# Patient Record
Sex: MALE | Race: WHITE | NOT HISPANIC OR LATINO | Employment: OTHER | URBAN - METROPOLITAN AREA
[De-identification: names, ages, dates, MRNs, and addresses within clinical notes are randomized per-mention and may not be internally consistent; named-entity substitution may affect disease eponyms.]

---

## 2023-03-14 DIAGNOSIS — E11.49 TYPE 2 DIABETES MELLITUS WITH OTHER DIABETIC NEUROLOGICAL COMPLICATION (MULTI): ICD-10-CM

## 2023-03-14 RX ORDER — PIOGLITAZONEHYDROCHLORIDE 45 MG/1
TABLET ORAL
Qty: 90 TABLET | Refills: 1 | Status: SHIPPED | OUTPATIENT
Start: 2023-03-14 | End: 2023-06-19 | Stop reason: SDUPTHER

## 2023-03-15 LAB
ALANINE AMINOTRANSFERASE (SGPT) (U/L) IN SER/PLAS: 33 U/L (ref 10–52)
ALBUMIN (G/DL) IN SER/PLAS: 4.6 G/DL (ref 3.4–5)
ALKALINE PHOSPHATASE (U/L) IN SER/PLAS: 50 U/L (ref 33–136)
ANION GAP IN SER/PLAS: 12 MMOL/L (ref 10–20)
ASPARTATE AMINOTRANSFERASE (SGOT) (U/L) IN SER/PLAS: 25 U/L (ref 9–39)
BASOPHILS (10*3/UL) IN BLOOD BY AUTOMATED COUNT: 0.05 X10E9/L (ref 0–0.1)
BASOPHILS/100 LEUKOCYTES IN BLOOD BY AUTOMATED COUNT: 0.7 % (ref 0–2)
BILIRUBIN TOTAL (MG/DL) IN SER/PLAS: 0.4 MG/DL (ref 0–1.2)
CALCIUM (MG/DL) IN SER/PLAS: 10.1 MG/DL (ref 8.6–10.3)
CARBON DIOXIDE, TOTAL (MMOL/L) IN SER/PLAS: 30 MMOL/L (ref 21–32)
CHLORIDE (MMOL/L) IN SER/PLAS: 102 MMOL/L (ref 98–107)
CREATININE (MG/DL) IN SER/PLAS: 1.32 MG/DL (ref 0.5–1.3)
EOSINOPHILS (10*3/UL) IN BLOOD BY AUTOMATED COUNT: 0.3 X10E9/L (ref 0–0.7)
EOSINOPHILS/100 LEUKOCYTES IN BLOOD BY AUTOMATED COUNT: 4.2 % (ref 0–6)
ERYTHROCYTE DISTRIBUTION WIDTH (RATIO) BY AUTOMATED COUNT: 14.6 % (ref 11.5–14.5)
ERYTHROCYTE MEAN CORPUSCULAR HEMOGLOBIN CONCENTRATION (G/DL) BY AUTOMATED: 31.8 G/DL (ref 32–36)
ERYTHROCYTE MEAN CORPUSCULAR VOLUME (FL) BY AUTOMATED COUNT: 91 FL (ref 80–100)
ERYTHROCYTES (10*6/UL) IN BLOOD BY AUTOMATED COUNT: 4.48 X10E12/L (ref 4.5–5.9)
GFR MALE: 58 ML/MIN/1.73M2
GLUCOSE (MG/DL) IN SER/PLAS: 146 MG/DL (ref 74–99)
HEMATOCRIT (%) IN BLOOD BY AUTOMATED COUNT: 40.9 % (ref 41–52)
HEMOGLOBIN (G/DL) IN BLOOD: 13 G/DL (ref 13.5–17.5)
IMMATURE GRANULOCYTES/100 LEUKOCYTES IN BLOOD BY AUTOMATED COUNT: 0.3 % (ref 0–0.9)
LEUKOCYTES (10*3/UL) IN BLOOD BY AUTOMATED COUNT: 7.2 X10E9/L (ref 4.4–11.3)
LYMPHOCYTES (10*3/UL) IN BLOOD BY AUTOMATED COUNT: 1.71 X10E9/L (ref 1.2–4.8)
LYMPHOCYTES/100 LEUKOCYTES IN BLOOD BY AUTOMATED COUNT: 23.9 % (ref 13–44)
MONOCYTES (10*3/UL) IN BLOOD BY AUTOMATED COUNT: 0.85 X10E9/L (ref 0.1–1)
MONOCYTES/100 LEUKOCYTES IN BLOOD BY AUTOMATED COUNT: 11.9 % (ref 2–10)
NEUTROPHILS (10*3/UL) IN BLOOD BY AUTOMATED COUNT: 4.23 X10E9/L (ref 1.2–7.7)
NEUTROPHILS/100 LEUKOCYTES IN BLOOD BY AUTOMATED COUNT: 59 % (ref 40–80)
PLATELETS (10*3/UL) IN BLOOD AUTOMATED COUNT: 234 X10E9/L (ref 150–450)
POTASSIUM (MMOL/L) IN SER/PLAS: 3.7 MMOL/L (ref 3.5–5.3)
PROTEIN TOTAL: 7.5 G/DL (ref 6.4–8.2)
SODIUM (MMOL/L) IN SER/PLAS: 140 MMOL/L (ref 136–145)
URATE (MG/DL) IN SER/PLAS: 9.2 MG/DL (ref 4–7.5)
UREA NITROGEN (MG/DL) IN SER/PLAS: 26 MG/DL (ref 6–23)

## 2023-03-30 DIAGNOSIS — I10 ESSENTIAL (PRIMARY) HYPERTENSION: ICD-10-CM

## 2023-03-30 DIAGNOSIS — Z86.79 PERSONAL HISTORY OF OTHER DISEASES OF THE CIRCULATORY SYSTEM: ICD-10-CM

## 2023-03-30 PROBLEM — E66.9 OBESITY: Status: ACTIVE | Noted: 2023-03-30

## 2023-03-30 PROBLEM — E11.9 TYPE 2 DIABETES MELLITUS (MULTI): Status: ACTIVE | Noted: 2023-03-30

## 2023-03-30 PROBLEM — M25.40 JOINT SWELLING: Status: ACTIVE | Noted: 2023-03-30

## 2023-03-30 PROBLEM — E11.65 POORLY CONTROLLED TYPE 2 DIABETES MELLITUS (MULTI): Status: ACTIVE | Noted: 2023-03-30

## 2023-03-30 PROBLEM — N40.0 BPH (BENIGN PROSTATIC HYPERPLASIA): Status: ACTIVE | Noted: 2023-03-30

## 2023-03-30 PROBLEM — K76.0 FATTY LIVER: Status: ACTIVE | Noted: 2023-03-30

## 2023-03-30 PROBLEM — Z86.0100 PERSONAL HISTORY OF COLONIC POLYPS: Status: ACTIVE | Noted: 2023-03-30

## 2023-03-30 PROBLEM — E83.10 DISORDER OF IRON METABOLISM: Status: ACTIVE | Noted: 2023-03-30

## 2023-03-30 PROBLEM — Z95.5 PRESENCE OF CORONARY ANGIOPLASTY IMPLANT AND GRAFT: Status: ACTIVE | Noted: 2023-03-30

## 2023-03-30 PROBLEM — M79.89 SWELLING OF LEFT HAND: Status: ACTIVE | Noted: 2023-03-30

## 2023-03-30 PROBLEM — I50.42 CHRONIC COMBINED SYSTOLIC AND DIASTOLIC CHF (CONGESTIVE HEART FAILURE) (MULTI): Status: ACTIVE | Noted: 2023-03-30

## 2023-03-30 PROBLEM — L20.9 ATOPIC DERMATITIS: Status: ACTIVE | Noted: 2023-03-30

## 2023-03-30 PROBLEM — G25.81 RESTLESS LEGS SYNDROME: Status: ACTIVE | Noted: 2023-03-30

## 2023-03-30 PROBLEM — I51.9 CARDIAC DISEASE: Status: ACTIVE | Noted: 2023-03-30
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
## 2023-03-30 PROBLEM — G47.33 OBSTRUCTIVE SLEEP APNEA SYNDROME IN ADULT: Status: ACTIVE | Noted: 2023-03-30

## 2023-03-30 PROBLEM — I51.7 HYPERTROPHY OF INTER-ATRIAL SEPTUM: Status: ACTIVE | Noted: 2023-03-30

## 2023-03-30 PROBLEM — E83.52 HYPERCALCEMIA: Status: ACTIVE | Noted: 2023-03-30

## 2023-03-30 PROBLEM — J30.2 SEASONAL ALLERGIES: Status: ACTIVE | Noted: 2023-03-30

## 2023-03-30 PROBLEM — N28.89 RENAL MASS: Status: ACTIVE | Noted: 2023-03-30

## 2023-03-30 PROBLEM — I48.0 PAF (PAROXYSMAL ATRIAL FIBRILLATION) (MULTI): Status: ACTIVE | Noted: 2023-03-30

## 2023-03-30 PROBLEM — I25.10 ASHD (ARTERIOSCLEROTIC HEART DISEASE): Status: ACTIVE | Noted: 2023-03-30

## 2023-03-30 PROBLEM — E78.5 HYPERLIPIDEMIA: Status: ACTIVE | Noted: 2023-03-30

## 2023-03-30 PROBLEM — Z86.010 PERSONAL HISTORY OF COLONIC POLYPS: Status: ACTIVE | Noted: 2023-03-30

## 2023-03-30 PROBLEM — M65.332 TRIGGER MIDDLE FINGER OF LEFT HAND: Status: ACTIVE | Noted: 2023-03-30

## 2023-03-30 PROBLEM — K21.9 CHRONIC GERD: Status: ACTIVE | Noted: 2023-03-30

## 2023-03-30 PROBLEM — D64.9 ANEMIA: Status: ACTIVE | Noted: 2023-03-30

## 2023-03-30 PROBLEM — E11.49: Status: ACTIVE | Noted: 2023-03-30

## 2023-03-30 PROBLEM — M19.042 PRIMARY OSTEOARTHRITIS OF LEFT HAND: Status: ACTIVE | Noted: 2023-03-30

## 2023-03-30 PROBLEM — L03.114 CELLULITIS OF HAND, LEFT: Status: ACTIVE | Noted: 2023-03-30

## 2023-03-30 PROBLEM — M54.50 LOW BACK PAIN: Status: ACTIVE | Noted: 2023-03-30

## 2023-03-30 RX ORDER — CARVEDILOL 25 MG/1
TABLET ORAL 2 TIMES DAILY
COMMUNITY
Start: 2017-05-17 | End: 2023-03-31 | Stop reason: SDUPTHER

## 2023-03-30 RX ORDER — CLONIDINE HYDROCHLORIDE 0.1 MG/1
0.1 TABLET ORAL 2 TIMES DAILY
COMMUNITY
End: 2023-03-31 | Stop reason: SDUPTHER

## 2023-03-30 RX ORDER — TRIAMCINOLONE ACETONIDE 1 MG/G
CREAM TOPICAL
COMMUNITY
Start: 2023-01-04 | End: 2023-06-19 | Stop reason: ALTCHOICE

## 2023-03-30 RX ORDER — PROPRANOLOL/HYDROCHLOROTHIAZID 40 MG-25MG
1 TABLET ORAL DAILY
COMMUNITY

## 2023-03-30 RX ORDER — ZINC GLUCONATE 50 MG
1 TABLET ORAL DAILY
COMMUNITY
Start: 2021-11-10

## 2023-03-30 RX ORDER — BLOOD SUGAR DIAGNOSTIC
STRIP MISCELLANEOUS
COMMUNITY

## 2023-03-30 RX ORDER — AMLODIPINE BESYLATE 2.5 MG/1
2.5 TABLET ORAL NIGHTLY
COMMUNITY
End: 2023-03-31 | Stop reason: SDUPTHER

## 2023-03-30 RX ORDER — LOSARTAN POTASSIUM 100 MG/1
1 TABLET ORAL DAILY
COMMUNITY
Start: 2022-12-20 | End: 2023-12-04

## 2023-03-30 RX ORDER — GLUCOSAMINE/CHONDROITIN/C/MANG 500-400 MG
CAPSULE ORAL
COMMUNITY

## 2023-03-30 RX ORDER — GLIMEPIRIDE 1 MG/1
1 TABLET ORAL DAILY
COMMUNITY
Start: 2022-03-03 | End: 2023-06-07 | Stop reason: SDUPTHER

## 2023-03-30 RX ORDER — ASPIRIN 81 MG/1
TABLET ORAL
COMMUNITY

## 2023-03-30 RX ORDER — FERROUS SULFATE 325(65) MG
65 TABLET ORAL DAILY
COMMUNITY

## 2023-03-30 RX ORDER — MULTIVIT WITH IRON,MINERALS
1 TABLET,CHEWABLE ORAL DAILY
COMMUNITY
End: 2024-01-09 | Stop reason: WASHOUT

## 2023-03-30 RX ORDER — PIOGLITAZONEHYDROCHLORIDE 45 MG/1
1 TABLET ORAL DAILY
COMMUNITY
Start: 2017-05-17 | End: 2023-08-07 | Stop reason: SDUPTHER

## 2023-03-30 RX ORDER — MONTELUKAST SODIUM 4 MG/1
TABLET, CHEWABLE ORAL
COMMUNITY
Start: 2016-10-27 | End: 2023-06-07 | Stop reason: SDUPTHER

## 2023-03-30 RX ORDER — EMPAGLIFLOZIN 25 MG/1
25 TABLET, FILM COATED ORAL DAILY
COMMUNITY
End: 2023-12-04

## 2023-03-30 RX ORDER — GABAPENTIN 300 MG/1
600 CAPSULE ORAL NIGHTLY
COMMUNITY
End: 2023-12-04

## 2023-03-30 RX ORDER — OMEGA-3 FATTY ACIDS/FISH OIL 340-1000MG
CAPSULE ORAL
COMMUNITY
End: 2023-06-19 | Stop reason: SDUPTHER

## 2023-03-30 RX ORDER — METFORMIN HYDROCHLORIDE 1000 MG/1
1000 TABLET ORAL EVERY 12 HOURS
COMMUNITY
End: 2023-04-13

## 2023-03-30 RX ORDER — HYDROCHLOROTHIAZIDE 25 MG/1
1 TABLET ORAL DAILY
COMMUNITY
Start: 2017-05-17 | End: 2023-03-31 | Stop reason: SDUPTHER

## 2023-03-30 RX ORDER — MULTIVITAMIN
1 TABLET ORAL DAILY
COMMUNITY
Start: 2017-05-17

## 2023-03-30 RX ORDER — EXENATIDE 2 MG/.85ML
INJECTION, SUSPENSION, EXTENDED RELEASE SUBCUTANEOUS
COMMUNITY
End: 2023-08-25

## 2023-03-30 RX ORDER — ROSUVASTATIN CALCIUM 20 MG/1
20 TABLET, COATED ORAL DAILY
COMMUNITY
End: 2024-01-22

## 2023-03-30 RX ORDER — FUROSEMIDE 40 MG/1
40 TABLET ORAL DAILY
COMMUNITY
End: 2023-12-04

## 2023-03-30 RX ORDER — ASCORBIC ACID 500 MG
TABLET ORAL
COMMUNITY

## 2023-03-30 RX ORDER — MAG HYDROX/ALUMINUM HYD/SIMETH 400-400-40
1 SUSPENSION, ORAL (FINAL DOSE FORM) ORAL DAILY
COMMUNITY

## 2023-03-30 RX ORDER — FAMOTIDINE 20 MG/1
1 TABLET, FILM COATED ORAL DAILY
COMMUNITY
Start: 2017-05-17 | End: 2023-06-07 | Stop reason: SDUPTHER

## 2023-03-30 RX ORDER — ALLOPURINOL 100 MG/1
1 TABLET ORAL DAILY
COMMUNITY
Start: 2023-03-22 | End: 2024-01-09 | Stop reason: SDUPTHER

## 2023-03-30 RX ORDER — LISINOPRIL 20 MG/1
1 TABLET ORAL DAILY
COMMUNITY
Start: 2017-05-17 | End: 2023-06-19 | Stop reason: ALTCHOICE

## 2023-03-30 RX ORDER — CALC/MAG/B COMPLEX/D3/HERB 61
1 TABLET ORAL DAILY
COMMUNITY

## 2023-03-31 RX ORDER — CARVEDILOL 25 MG/1
TABLET ORAL
Qty: 180 TABLET | Refills: 1 | Status: SHIPPED | OUTPATIENT
Start: 2023-03-31 | End: 2023-07-10

## 2023-03-31 RX ORDER — HYDROCHLOROTHIAZIDE 25 MG/1
TABLET ORAL
Qty: 90 TABLET | Refills: 1 | Status: SHIPPED | OUTPATIENT
Start: 2023-03-31 | End: 2023-08-07

## 2023-03-31 RX ORDER — AMLODIPINE BESYLATE 2.5 MG/1
TABLET ORAL
Qty: 90 TABLET | Refills: 1 | Status: SHIPPED | OUTPATIENT
Start: 2023-03-31 | End: 2023-08-07

## 2023-04-08 DIAGNOSIS — E11.49 TYPE 2 DIABETES MELLITUS WITH OTHER DIABETIC NEUROLOGICAL COMPLICATION (MULTI): ICD-10-CM

## 2023-04-13 RX ORDER — METFORMIN HYDROCHLORIDE 1000 MG/1
TABLET ORAL
Qty: 180 TABLET | Refills: 1 | Status: SHIPPED | OUTPATIENT
Start: 2023-04-13 | End: 2023-08-07

## 2023-06-03 DIAGNOSIS — E66.9 OBESITY, UNSPECIFIED: ICD-10-CM

## 2023-06-03 DIAGNOSIS — E78.5 HYPERLIPIDEMIA, UNSPECIFIED: ICD-10-CM

## 2023-06-03 DIAGNOSIS — E83.52 HYPERCALCEMIA: ICD-10-CM

## 2023-06-06 PROBLEM — T46.4X5A ACE-INHIBITOR COUGH: Status: ACTIVE | Noted: 2023-06-06

## 2023-06-06 PROBLEM — R30.0 DYSURIA: Status: ACTIVE | Noted: 2023-06-06

## 2023-06-06 PROBLEM — M62.9 NODULE IN MUSCLE: Status: ACTIVE | Noted: 2023-06-06

## 2023-06-06 PROBLEM — R05.8 ACE-INHIBITOR COUGH: Status: ACTIVE | Noted: 2023-06-06

## 2023-06-06 RX ORDER — CLONIDINE HYDROCHLORIDE 0.1 MG/1
1 TABLET ORAL 2 TIMES DAILY
COMMUNITY
Start: 2017-04-04

## 2023-06-07 RX ORDER — MONTELUKAST SODIUM 4 MG/1
TABLET, CHEWABLE ORAL
Qty: 360 TABLET | Refills: 0 | Status: SHIPPED | OUTPATIENT
Start: 2023-06-07 | End: 2023-07-10

## 2023-06-07 RX ORDER — GLIMEPIRIDE 1 MG/1
TABLET ORAL
Qty: 90 TABLET | Refills: 0 | Status: SHIPPED | OUTPATIENT
Start: 2023-06-07 | End: 2023-07-10

## 2023-06-07 RX ORDER — FAMOTIDINE 20 MG/1
TABLET, FILM COATED ORAL
Qty: 90 TABLET | Refills: 0 | Status: SHIPPED | OUTPATIENT
Start: 2023-06-07 | End: 2023-07-10

## 2023-06-12 LAB
ALANINE AMINOTRANSFERASE (SGPT) (U/L) IN SER/PLAS: 40 U/L (ref 10–52)
ALBUMIN (G/DL) IN SER/PLAS: 4.5 G/DL (ref 3.4–5)
ALBUMIN (MG/L) IN URINE: 7.2 MG/L
ALBUMIN/CREATININE (UG/MG) IN URINE: 9.3 UG/MG CRT (ref 0–30)
ALKALINE PHOSPHATASE (U/L) IN SER/PLAS: 53 U/L (ref 33–136)
ANION GAP IN SER/PLAS: 16 MMOL/L (ref 10–20)
ASPARTATE AMINOTRANSFERASE (SGOT) (U/L) IN SER/PLAS: 31 U/L (ref 9–39)
BASOPHILS (10*3/UL) IN BLOOD BY AUTOMATED COUNT: 0.04 X10E9/L (ref 0–0.1)
BASOPHILS/100 LEUKOCYTES IN BLOOD BY AUTOMATED COUNT: 0.5 % (ref 0–2)
BILIRUBIN TOTAL (MG/DL) IN SER/PLAS: 0.5 MG/DL (ref 0–1.2)
CALCIDIOL (25 OH VITAMIN D3) (NG/ML) IN SER/PLAS: 34 NG/ML
CALCIUM (MG/DL) IN SER/PLAS: 10.5 MG/DL (ref 8.6–10.3)
CARBON DIOXIDE, TOTAL (MMOL/L) IN SER/PLAS: 27 MMOL/L (ref 21–32)
CHLORIDE (MMOL/L) IN SER/PLAS: 101 MMOL/L (ref 98–107)
CHOLESTEROL (MG/DL) IN SER/PLAS: 119 MG/DL (ref 0–199)
CHOLESTEROL IN HDL (MG/DL) IN SER/PLAS: 35.5 MG/DL
CHOLESTEROL/HDL RATIO: 3.4
CREATININE (MG/DL) IN SER/PLAS: 1.24 MG/DL (ref 0.5–1.3)
CREATININE (MG/DL) IN URINE: 77.1 MG/DL (ref 20–370)
EOSINOPHILS (10*3/UL) IN BLOOD BY AUTOMATED COUNT: 0.47 X10E9/L (ref 0–0.7)
EOSINOPHILS/100 LEUKOCYTES IN BLOOD BY AUTOMATED COUNT: 6 % (ref 0–6)
ERYTHROCYTE DISTRIBUTION WIDTH (RATIO) BY AUTOMATED COUNT: 14.6 % (ref 11.5–14.5)
ERYTHROCYTE MEAN CORPUSCULAR HEMOGLOBIN CONCENTRATION (G/DL) BY AUTOMATED: 31.7 G/DL (ref 32–36)
ERYTHROCYTE MEAN CORPUSCULAR VOLUME (FL) BY AUTOMATED COUNT: 92 FL (ref 80–100)
ERYTHROCYTES (10*6/UL) IN BLOOD BY AUTOMATED COUNT: 4.5 X10E12/L (ref 4.5–5.9)
ESTIMATED AVERAGE GLUCOSE FOR HBA1C: 166 MG/DL
FERRITIN (UG/LL) IN SER/PLAS: 103 UG/L (ref 20–300)
GFR MALE: 62 ML/MIN/1.73M2
GLUCOSE (MG/DL) IN SER/PLAS: 151 MG/DL (ref 74–99)
HEMATOCRIT (%) IN BLOOD BY AUTOMATED COUNT: 41.3 % (ref 41–52)
HEMOGLOBIN (G/DL) IN BLOOD: 13.1 G/DL (ref 13.5–17.5)
HEMOGLOBIN A1C/HEMOGLOBIN TOTAL IN BLOOD: 7.4 %
IMMATURE GRANULOCYTES/100 LEUKOCYTES IN BLOOD BY AUTOMATED COUNT: 0.5 % (ref 0–0.9)
IRON (UG/DL) IN SER/PLAS: 42 UG/DL (ref 35–150)
IRON BINDING CAPACITY (UG/DL) IN SER/PLAS: 423 UG/DL (ref 240–445)
IRON SATURATION (%) IN SER/PLAS: 10 % (ref 25–45)
LDL: 52 MG/DL (ref 0–99)
LEUKOCYTES (10*3/UL) IN BLOOD BY AUTOMATED COUNT: 7.9 X10E9/L (ref 4.4–11.3)
LYMPHOCYTES (10*3/UL) IN BLOOD BY AUTOMATED COUNT: 1.46 X10E9/L (ref 1.2–4.8)
LYMPHOCYTES/100 LEUKOCYTES IN BLOOD BY AUTOMATED COUNT: 18.5 % (ref 13–44)
MONOCYTES (10*3/UL) IN BLOOD BY AUTOMATED COUNT: 0.94 X10E9/L (ref 0.1–1)
MONOCYTES/100 LEUKOCYTES IN BLOOD BY AUTOMATED COUNT: 11.9 % (ref 2–10)
NEUTROPHILS (10*3/UL) IN BLOOD BY AUTOMATED COUNT: 4.93 X10E9/L (ref 1.2–7.7)
NEUTROPHILS/100 LEUKOCYTES IN BLOOD BY AUTOMATED COUNT: 62.6 % (ref 40–80)
PLATELETS (10*3/UL) IN BLOOD AUTOMATED COUNT: 205 X10E9/L (ref 150–450)
POTASSIUM (MMOL/L) IN SER/PLAS: 3.6 MMOL/L (ref 3.5–5.3)
PROTEIN TOTAL: 7.2 G/DL (ref 6.4–8.2)
SODIUM (MMOL/L) IN SER/PLAS: 140 MMOL/L (ref 136–145)
TRIGLYCERIDE (MG/DL) IN SER/PLAS: 160 MG/DL (ref 0–149)
UREA NITROGEN (MG/DL) IN SER/PLAS: 28 MG/DL (ref 6–23)
VLDL: 32 MG/DL (ref 0–40)

## 2023-06-19 ENCOUNTER — OFFICE VISIT (OUTPATIENT)
Dept: PRIMARY CARE | Facility: CLINIC | Age: 73
End: 2023-06-19
Payer: MEDICARE

## 2023-06-19 VITALS
TEMPERATURE: 96.6 F | HEART RATE: 65 BPM | OXYGEN SATURATION: 99 % | BODY MASS INDEX: 30.46 KG/M2 | DIASTOLIC BLOOD PRESSURE: 76 MMHG | WEIGHT: 245 LBS | HEIGHT: 75 IN | SYSTOLIC BLOOD PRESSURE: 129 MMHG

## 2023-06-19 DIAGNOSIS — Z86.010 PERSONAL HISTORY OF COLONIC POLYPS: ICD-10-CM

## 2023-06-19 DIAGNOSIS — Z00.00 ROUTINE GENERAL MEDICAL EXAMINATION AT HEALTH CARE FACILITY: Primary | ICD-10-CM

## 2023-06-19 DIAGNOSIS — D64.9 ANEMIA, UNSPECIFIED TYPE: ICD-10-CM

## 2023-06-19 DIAGNOSIS — I10 BENIGN ESSENTIAL HYPERTENSION: ICD-10-CM

## 2023-06-19 DIAGNOSIS — M10.9 GOUT INVOLVING TOE OF LEFT FOOT, UNSPECIFIED CAUSE, UNSPECIFIED CHRONICITY: ICD-10-CM

## 2023-06-19 DIAGNOSIS — E11.40 CONTROLLED TYPE 2 DIABETES MELLITUS WITH DIABETIC NEUROPATHY, WITHOUT LONG-TERM CURRENT USE OF INSULIN (MULTI): ICD-10-CM

## 2023-06-19 DIAGNOSIS — E11.42 TYPE 2 DIABETES MELLITUS WITH DIABETIC POLYNEUROPATHY, WITHOUT LONG-TERM CURRENT USE OF INSULIN (MULTI): ICD-10-CM

## 2023-06-19 DIAGNOSIS — E83.52 HYPERCALCEMIA: ICD-10-CM

## 2023-06-19 PROCEDURE — 1170F FXNL STATUS ASSESSED: CPT | Performed by: INTERNAL MEDICINE

## 2023-06-19 PROCEDURE — 3074F SYST BP LT 130 MM HG: CPT | Performed by: INTERNAL MEDICINE

## 2023-06-19 PROCEDURE — 3008F BODY MASS INDEX DOCD: CPT | Performed by: INTERNAL MEDICINE

## 2023-06-19 PROCEDURE — 1160F RVW MEDS BY RX/DR IN RCRD: CPT | Performed by: INTERNAL MEDICINE

## 2023-06-19 PROCEDURE — 99214 OFFICE O/P EST MOD 30 MIN: CPT | Performed by: INTERNAL MEDICINE

## 2023-06-19 PROCEDURE — 4010F ACE/ARB THERAPY RXD/TAKEN: CPT | Performed by: INTERNAL MEDICINE

## 2023-06-19 PROCEDURE — G0439 PPPS, SUBSEQ VISIT: HCPCS | Performed by: INTERNAL MEDICINE

## 2023-06-19 PROCEDURE — 3078F DIAST BP <80 MM HG: CPT | Performed by: INTERNAL MEDICINE

## 2023-06-19 PROCEDURE — 1159F MED LIST DOCD IN RCRD: CPT | Performed by: INTERNAL MEDICINE

## 2023-06-19 PROCEDURE — 1036F TOBACCO NON-USER: CPT | Performed by: INTERNAL MEDICINE

## 2023-06-19 PROCEDURE — 3051F HG A1C>EQUAL 7.0%<8.0%: CPT | Performed by: INTERNAL MEDICINE

## 2023-06-19 SDOH — ECONOMIC STABILITY: FOOD INSECURITY: WITHIN THE PAST 12 MONTHS, THE FOOD YOU BOUGHT JUST DIDN'T LAST AND YOU DIDN'T HAVE MONEY TO GET MORE.: NEVER TRUE

## 2023-06-19 SDOH — ECONOMIC STABILITY: FOOD INSECURITY: WITHIN THE PAST 12 MONTHS, YOU WORRIED THAT YOUR FOOD WOULD RUN OUT BEFORE YOU GOT MONEY TO BUY MORE.: NEVER TRUE

## 2023-06-19 ASSESSMENT — LIFESTYLE VARIABLES
HOW OFTEN DO YOU HAVE A DRINK CONTAINING ALCOHOL: NEVER
HOW MANY STANDARD DRINKS CONTAINING ALCOHOL DO YOU HAVE ON A TYPICAL DAY: PATIENT DOES NOT DRINK
SKIP TO QUESTIONS 9-10: 1
SKIP TO QUESTIONS 9-10: 1
AUDIT-C TOTAL SCORE: 0
HOW OFTEN DO YOU HAVE A DRINK CONTAINING ALCOHOL: NEVER
AUDIT-C TOTAL SCORE: 0
HOW OFTEN DO YOU HAVE SIX OR MORE DRINKS ON ONE OCCASION: NEVER
HOW OFTEN DO YOU HAVE SIX OR MORE DRINKS ON ONE OCCASION: NEVER
HOW MANY STANDARD DRINKS CONTAINING ALCOHOL DO YOU HAVE ON A TYPICAL DAY: PATIENT DOES NOT DRINK

## 2023-06-19 ASSESSMENT — PATIENT HEALTH QUESTIONNAIRE - PHQ9
1. LITTLE INTEREST OR PLEASURE IN DOING THINGS: NOT AT ALL
SUM OF ALL RESPONSES TO PHQ9 QUESTIONS 1 & 2: 0
2. FEELING DOWN, DEPRESSED OR HOPELESS: NOT AT ALL
1. LITTLE INTEREST OR PLEASURE IN DOING THINGS: NOT AT ALL
SUM OF ALL RESPONSES TO PHQ9 QUESTIONS 1 & 2: 0
2. FEELING DOWN, DEPRESSED OR HOPELESS: NOT AT ALL

## 2023-06-19 ASSESSMENT — ACTIVITIES OF DAILY LIVING (ADL)
BATHING: INDEPENDENT
TAKING_MEDICATION: INDEPENDENT
DOING_HOUSEWORK: INDEPENDENT
MANAGING_FINANCES: INDEPENDENT
DRESSING: INDEPENDENT
GROCERY_SHOPPING: INDEPENDENT

## 2023-06-19 ASSESSMENT — ENCOUNTER SYMPTOMS
DEPRESSION: 0
OCCASIONAL FEELINGS OF UNSTEADINESS: 1
LOSS OF SENSATION IN FEET: 1

## 2023-06-19 ASSESSMENT — PAIN SCALES - GENERAL: PAINLEVEL: 0-NO PAIN

## 2023-06-19 NOTE — PROGRESS NOTES
"Subjective   Reason for Visit: Isauro Valdez is an 72 y.o. male here for a Medicare Wellness visit.     Past Medical, Surgical, and Family History reviewed and updated in chart.    Reviewed all medications by prescribing practitioner or clinical pharmacist (such as prescriptions, OTCs, herbal therapies and supplements) and documented in the medical record.    HPI    foot issues: patient has callus issue, he sees Dr Wright periodically     follow up, patient has lost weight     DM type 2: Patient takes Bydureon, Actos, metformin, Jardiance., glimepiride now, glucoses have improved , he has seen endocrinology also (Dr Shah)     CAD/ CHF/ HTN: he takes carvedilol, clonidine, HCTZ, Losartan, and amlodipine. Patient sees Dr Nunez also (cardiology), cough subsided with withdrawal of lisinopril     hyperlipidemia: he takes rosuvastatin and colestipol      back pain: patient takes gabapentin at bedtime, he takes 600 mg at bedtime, it helps with restless leg issues also per recommendation of Dr Angel (sleep medicine), he now takes iron once daily also to help with restless legs. \"I still get a pinch, when I move\" , he does have an iron deficiency    Colon polyps: he is to have follow up for colonoscopy with Dr Sanders     BPH/ spot on the kidney: he takes anaid martinez, he sees Dr Thompson    Gout: he now takes allopurinol, he was started on med by Dr Wright, he last had blood work checked for gout a few months ago  Patient Care Team:  Javier Hong MD as PCP - General  Javier Hong MD as PCP - [employer] PCP     Review of Systems  Constitutional: recent weight loss, but not feeling poorly, no fever, no recent weight gain and no chills.   Eyes: gets yearly eye exam, wears glasses, but no blurred vision and no eyesight problems.   ENT: hearing loss and wears hearing aids.   Cardiovascular: no chest pain, no tightness or heavy pressure, no shortness of breath and no lower extremity edema. " "  Respiratory: shortness of breath during exertion and \"I'm out of shape\", wears boots to prevent ankle pain, but no cough, not coughing up sputum, no wheezing that is consistent with asthma and no chronic cough.   Gastrointestinal: has some symptoms of GERD, he had colonoscopy and removal of adenomatous colon polyps, he will have colonoscopy rechecked per Dr Sanders, but no change in bowel habits, no diarrhea, no constipation, no bloody stools, no nausea, no vomiting, no abdominal pain and no signs and symptoms of ulcer disease.   Genitourinary: sees Dr Thompson, he has kidney mass, he takes saw palmetto, but no urinary frequency, no incontinence and as noted in HPI.   Musculoskeletal: arthralgias, back pain still present, recent episode of gout   Skin:  He gets eczema , he has had history tinea cruris.   Neurological: no headaches, no dizziness, no seizures, numbness and tingling of the feet are noted  Endocrine: diabetes mellitus, but no thyroid disorder.     Objective   Vitals:  /76 (BP Location: Right arm, Patient Position: Sitting, BP Cuff Size: Adult)   Pulse 65   Temp 35.9 °C (96.6 °F) (Temporal)   Ht 1.905 m (6' 3\")   Wt 111 kg (245 lb)   SpO2 99%   BMI 30.62 kg/m²       Physical Exam    Constitutional   General appearance: Abnormal.  well developed, well nourished, overweight and wearing a mask, accompanied by wife, he has abdominal obesity.   Eyes   Inspection of eyes: Sclera and conjunctiva were normal. wearing glasses.   Ears, Nose, Mouth, and Throat   Ears: Auricles: Normal. No neck masses, no carotid bruits noted  Pulmonary   Respiratory assessment: No respiratory distress, normal respiratory rhythm and effort.    Auscultation of Lungs: Clear bilateral breath sounds.   Cardiovascular   Auscultation of heart: Apical pulse normal, heart rate and rhythm normal, normal S1 and S2, no murmurs and no pericardial rub. The heart rate was normal. The rhythm was regular. Heart sounds: the heart sounds were " distant, but normal S1 and normal S2. no murmurs were heard.    Exam for edema: No peripheral edema.   Lymphatic   Palpation of lymph nodes in neck: No cervical lymphadenopathy.   Skin   Examination of the skin for lesions: no obvious skin lesions are noted. He will follow up with Dr Paniagua   Neurologic   Cranial nerves: Nerves 2-12 were intact, no focal neuro defects. wearing a mask.   Psychiatric   Orientation: Oriented to person, place, and time.    Mood and affect: Normal.   Assessment/Plan   Problem List Items Addressed This Visit       Personal history of colonic polyps    Current Assessment & Plan     Follow up with Dr Sanders for colonoscopy         Relevant Orders    Colonoscopy    TSH with reflex to Free T4 if abnormal    Vitamin D 1,25 Dihydroxy    Comprehensive Metabolic Panel    Type 2 diabetes mellitus (CMS/HCC)    Current Assessment & Plan     No med changes for now, follow up with endocrinology         Relevant Orders    TSH with reflex to Free T4 if abnormal    Vitamin D 1,25 Dihydroxy    Comprehensive Metabolic Panel    Hypercalcemia    Relevant Orders    TSH with reflex to Free T4 if abnormal    Comprehensive Metabolic Panel    Calcium, ionized    Anemia    Relevant Orders    CBC and Auto Differential    Benign essential hypertension    Current Assessment & Plan     BP is controlled, no med changes for now         Relevant Orders    TSH with reflex to Free T4 if abnormal    Vitamin D 1,25 Dihydroxy    Comprehensive Metabolic Panel    Lipid panel    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)    Current Assessment & Plan     Takes multiple meds, no change for now, recheck labs as ordered, follow up with endocrinology         Relevant Orders    Hemoglobin A1C    TSH with reflex to Free T4 if abnormal    Albumin , Urine Random    Vitamin D 1,25 Dihydroxy    Comprehensive Metabolic Panel    Routine general medical examination at health care facility - Primary    Current  Assessment & Plan     Medicare wellness completed today, check hepatitis c screening         Relevant Orders    Colonoscopy    TSH with reflex to Free T4 if abnormal    Vitamin D 1,25 Dihydroxy    Comprehensive Metabolic Panel    Lipid panel    Hepatitis C antibody     Other Visit Diagnoses       Gout involving toe of left foot, unspecified cause, unspecified chronicity        Relevant Orders    TSH with reflex to Free T4 if abnormal    Vitamin D 1,25 Dihydroxy    Comprehensive Metabolic Panel    Uric acid          Check labs as ordered, Medicare wellness exam completed today, colonoscopy follow up ordered, follow up in 6 months

## 2023-07-09 DIAGNOSIS — E66.9 OBESITY, UNSPECIFIED: ICD-10-CM

## 2023-07-09 DIAGNOSIS — E83.52 HYPERCALCEMIA: ICD-10-CM

## 2023-07-09 DIAGNOSIS — E78.5 HYPERLIPIDEMIA, UNSPECIFIED: ICD-10-CM

## 2023-07-09 DIAGNOSIS — I10 ESSENTIAL (PRIMARY) HYPERTENSION: ICD-10-CM

## 2023-07-10 RX ORDER — GLIMEPIRIDE 1 MG/1
TABLET ORAL
Qty: 90 TABLET | Refills: 0 | Status: SHIPPED | OUTPATIENT
Start: 2023-07-10 | End: 2023-09-07

## 2023-07-10 RX ORDER — CARVEDILOL 25 MG/1
TABLET ORAL
Qty: 180 TABLET | Refills: 1 | Status: SHIPPED | OUTPATIENT
Start: 2023-07-10 | End: 2024-01-22

## 2023-07-10 RX ORDER — FAMOTIDINE 20 MG/1
TABLET, FILM COATED ORAL
Qty: 90 TABLET | Refills: 0 | Status: SHIPPED | OUTPATIENT
Start: 2023-07-10 | End: 2023-11-24

## 2023-07-10 RX ORDER — MONTELUKAST SODIUM 4 MG/1
TABLET, CHEWABLE ORAL
Qty: 360 TABLET | Refills: 0 | Status: SHIPPED | OUTPATIENT
Start: 2023-07-10 | End: 2023-11-24

## 2023-08-05 DIAGNOSIS — I10 ESSENTIAL (PRIMARY) HYPERTENSION: ICD-10-CM

## 2023-08-05 DIAGNOSIS — L30.9 DERMATITIS, UNSPECIFIED: ICD-10-CM

## 2023-08-05 DIAGNOSIS — E11.49 TYPE 2 DIABETES MELLITUS WITH OTHER DIABETIC NEUROLOGICAL COMPLICATION (MULTI): ICD-10-CM

## 2023-08-05 DIAGNOSIS — Z86.79 PERSONAL HISTORY OF OTHER DISEASES OF THE CIRCULATORY SYSTEM: ICD-10-CM

## 2023-08-07 ENCOUNTER — TELEPHONE (OUTPATIENT)
Dept: PRIMARY CARE | Facility: CLINIC | Age: 73
End: 2023-08-07
Payer: MEDICARE

## 2023-08-07 DIAGNOSIS — E11.42 TYPE 2 DIABETES MELLITUS WITH DIABETIC POLYNEUROPATHY, WITHOUT LONG-TERM CURRENT USE OF INSULIN (MULTI): Primary | ICD-10-CM

## 2023-08-07 RX ORDER — TRIAMCINOLONE ACETONIDE 1 MG/G
CREAM TOPICAL
Qty: 30 G | Refills: 1 | Status: SHIPPED | OUTPATIENT
Start: 2023-08-07 | End: 2024-01-09 | Stop reason: WASHOUT

## 2023-08-07 RX ORDER — HYDROCHLOROTHIAZIDE 25 MG/1
TABLET ORAL
Qty: 90 TABLET | Refills: 1 | Status: SHIPPED | OUTPATIENT
Start: 2023-08-07 | End: 2024-04-23

## 2023-08-07 RX ORDER — PIOGLITAZONEHYDROCHLORIDE 45 MG/1
45 TABLET ORAL DAILY
Qty: 90 TABLET | Refills: 1 | Status: SHIPPED | OUTPATIENT
Start: 2023-08-07 | End: 2023-12-04

## 2023-08-07 RX ORDER — METFORMIN HYDROCHLORIDE 1000 MG/1
TABLET ORAL
Qty: 180 TABLET | Refills: 1 | Status: SHIPPED | OUTPATIENT
Start: 2023-08-07 | End: 2024-04-23

## 2023-08-07 RX ORDER — AMLODIPINE BESYLATE 2.5 MG/1
TABLET ORAL
Qty: 90 TABLET | Refills: 1 | Status: SHIPPED | OUTPATIENT
Start: 2023-08-07 | End: 2024-01-22

## 2023-08-07 NOTE — TELEPHONE ENCOUNTER
Patient called stating his pioglitazone (Actos) 45 mg tablet script was cancelled per the pharmacy.  Patient is asking that the script be sent    Pharmacy is [facility] in [place]

## 2023-08-25 DIAGNOSIS — E11.49 TYPE 2 DIABETES MELLITUS WITH OTHER DIABETIC NEUROLOGICAL COMPLICATION (MULTI): ICD-10-CM

## 2023-08-25 RX ORDER — EXENATIDE 2 MG/.85ML
INJECTION, SUSPENSION, EXTENDED RELEASE SUBCUTANEOUS
Qty: 12 EACH | Refills: 3 | Status: SHIPPED | OUTPATIENT
Start: 2023-08-25 | End: 2024-08-24

## 2023-09-07 DIAGNOSIS — E83.52 HYPERCALCEMIA: ICD-10-CM

## 2023-09-07 RX ORDER — GLIMEPIRIDE 1 MG/1
TABLET ORAL
Qty: 90 TABLET | Refills: 1 | Status: SHIPPED | OUTPATIENT
Start: 2023-09-07 | End: 2023-12-20 | Stop reason: SDUPTHER

## 2023-09-11 ENCOUNTER — HOSPITAL ENCOUNTER (OUTPATIENT)
Dept: DATA CONVERSION | Facility: HOSPITAL | Age: 73
End: 2023-09-11
Attending: INTERNAL MEDICINE | Admitting: INTERNAL MEDICINE
Payer: MEDICARE

## 2023-09-11 DIAGNOSIS — K63.5 POLYP OF COLON: ICD-10-CM

## 2023-09-11 DIAGNOSIS — Z12.11 ENCOUNTER FOR SCREENING FOR MALIGNANT NEOPLASM OF COLON: ICD-10-CM

## 2023-09-11 DIAGNOSIS — K64.8 OTHER HEMORRHOIDS: ICD-10-CM

## 2023-09-11 DIAGNOSIS — Z86.010 PERSONAL HISTORY OF COLONIC POLYPS: ICD-10-CM

## 2023-09-11 DIAGNOSIS — K57.30 DIVERTICULOSIS OF LARGE INTESTINE WITHOUT PERFORATION OR ABSCESS WITHOUT BLEEDING: ICD-10-CM

## 2023-09-15 LAB
COMPLETE PATHOLOGY REPORT: NORMAL
CONVERTED CLINICAL DIAGNOSIS-HISTORY: NORMAL
CONVERTED FINAL DIAGNOSIS: NORMAL
CONVERTED FINAL REPORT PDF LINK TO COPY AND PASTE: NORMAL
CONVERTED GROSS DESCRIPTION: NORMAL

## 2023-09-29 VITALS — WEIGHT: 270.06 LBS | HEIGHT: 75 IN | BODY MASS INDEX: 33.58 KG/M2

## 2023-11-24 DIAGNOSIS — E78.5 HYPERLIPIDEMIA, UNSPECIFIED: ICD-10-CM

## 2023-11-24 DIAGNOSIS — E66.9 OBESITY, UNSPECIFIED: ICD-10-CM

## 2023-11-24 RX ORDER — MONTELUKAST SODIUM 4 MG/1
TABLET, CHEWABLE ORAL
Qty: 360 TABLET | Refills: 0 | Status: SHIPPED | OUTPATIENT
Start: 2023-11-24 | End: 2024-01-22

## 2023-11-24 RX ORDER — FAMOTIDINE 20 MG/1
TABLET, FILM COATED ORAL
Qty: 90 TABLET | Refills: 0 | Status: SHIPPED | OUTPATIENT
Start: 2023-11-24 | End: 2023-12-04

## 2023-11-28 ENCOUNTER — APPOINTMENT (OUTPATIENT)
Dept: ENDOCRINOLOGY | Facility: CLINIC | Age: 73
End: 2023-11-28
Payer: MEDICARE

## 2023-12-04 DIAGNOSIS — E66.9 OBESITY, UNSPECIFIED: ICD-10-CM

## 2023-12-04 DIAGNOSIS — I48.0 PAROXYSMAL ATRIAL FIBRILLATION (MULTI): ICD-10-CM

## 2023-12-04 DIAGNOSIS — E11.42 TYPE 2 DIABETES MELLITUS WITH DIABETIC POLYNEUROPATHY, WITHOUT LONG-TERM CURRENT USE OF INSULIN (MULTI): ICD-10-CM

## 2023-12-04 DIAGNOSIS — T46.4X5A ADVERSE EFFECT OF ANGIOTENSIN-CONVERTING-ENZYME INHIBITORS, INITIAL ENCOUNTER: ICD-10-CM

## 2023-12-04 DIAGNOSIS — E11.49 TYPE 2 DIABETES MELLITUS WITH OTHER DIABETIC NEUROLOGICAL COMPLICATION (MULTI): ICD-10-CM

## 2023-12-04 DIAGNOSIS — R05.8 OTHER SPECIFIED COUGH: ICD-10-CM

## 2023-12-04 DIAGNOSIS — I50.42 CHRONIC COMBINED SYSTOLIC (CONGESTIVE) AND DIASTOLIC (CONGESTIVE) HEART FAILURE (MULTI): ICD-10-CM

## 2023-12-04 RX ORDER — PIOGLITAZONEHYDROCHLORIDE 45 MG/1
45 TABLET ORAL DAILY
Qty: 90 TABLET | Refills: 1 | Status: SHIPPED | OUTPATIENT
Start: 2023-12-04 | End: 2024-06-01

## 2023-12-04 RX ORDER — FAMOTIDINE 20 MG/1
TABLET, FILM COATED ORAL
Qty: 90 TABLET | Refills: 0 | Status: SHIPPED | OUTPATIENT
Start: 2023-12-04 | End: 2024-04-23

## 2023-12-04 RX ORDER — GABAPENTIN 300 MG/1
600 CAPSULE ORAL NIGHTLY
Qty: 180 CAPSULE | Refills: 3 | Status: SHIPPED | OUTPATIENT
Start: 2023-12-04

## 2023-12-04 RX ORDER — EMPAGLIFLOZIN 25 MG/1
25 TABLET, FILM COATED ORAL DAILY
Qty: 90 TABLET | Refills: 3 | Status: SHIPPED | OUTPATIENT
Start: 2023-12-04 | End: 2024-02-14 | Stop reason: SDUPTHER

## 2023-12-04 RX ORDER — LOSARTAN POTASSIUM 100 MG/1
100 TABLET ORAL DAILY
Qty: 90 TABLET | Refills: 3 | Status: SHIPPED | OUTPATIENT
Start: 2023-12-04

## 2023-12-04 RX ORDER — FUROSEMIDE 40 MG/1
40 TABLET ORAL DAILY
Qty: 90 TABLET | Refills: 3 | Status: SHIPPED | OUTPATIENT
Start: 2023-12-04

## 2023-12-18 ENCOUNTER — LAB (OUTPATIENT)
Dept: LAB | Facility: LAB | Age: 73
End: 2023-12-18
Payer: MEDICARE

## 2023-12-18 DIAGNOSIS — I10 BENIGN ESSENTIAL HYPERTENSION: ICD-10-CM

## 2023-12-18 DIAGNOSIS — E83.52 HYPERCALCEMIA: ICD-10-CM

## 2023-12-18 DIAGNOSIS — Z00.00 ROUTINE GENERAL MEDICAL EXAMINATION AT HEALTH CARE FACILITY: ICD-10-CM

## 2023-12-18 DIAGNOSIS — M10.9 GOUT INVOLVING TOE OF LEFT FOOT, UNSPECIFIED CAUSE, UNSPECIFIED CHRONICITY: ICD-10-CM

## 2023-12-18 DIAGNOSIS — E11.42 TYPE 2 DIABETES MELLITUS WITH DIABETIC POLYNEUROPATHY, WITHOUT LONG-TERM CURRENT USE OF INSULIN (MULTI): ICD-10-CM

## 2023-12-18 DIAGNOSIS — D64.9 ANEMIA, UNSPECIFIED TYPE: ICD-10-CM

## 2023-12-18 DIAGNOSIS — E11.40 CONTROLLED TYPE 2 DIABETES MELLITUS WITH DIABETIC NEUROPATHY, WITHOUT LONG-TERM CURRENT USE OF INSULIN (MULTI): ICD-10-CM

## 2023-12-18 DIAGNOSIS — Z86.010 PERSONAL HISTORY OF COLONIC POLYPS: ICD-10-CM

## 2023-12-18 LAB
ALBUMIN SERPL BCP-MCNC: 4.6 G/DL (ref 3.4–5)
ALP SERPL-CCNC: 51 U/L (ref 33–136)
ALT SERPL W P-5'-P-CCNC: 60 U/L (ref 10–52)
ANION GAP SERPL CALC-SCNC: 15 MMOL/L (ref 10–20)
AST SERPL W P-5'-P-CCNC: 44 U/L (ref 9–39)
BASOPHILS # BLD AUTO: 0.04 X10*3/UL (ref 0–0.1)
BASOPHILS NFR BLD AUTO: 0.7 %
BILIRUB SERPL-MCNC: 0.5 MG/DL (ref 0–1.2)
BUN SERPL-MCNC: 25 MG/DL (ref 6–23)
CA-I BLD-SCNC: 1.35 MMOL/L (ref 1.1–1.33)
CALCIUM SERPL-MCNC: 10.7 MG/DL (ref 8.6–10.3)
CHLORIDE SERPL-SCNC: 101 MMOL/L (ref 98–107)
CHOLEST SERPL-MCNC: 142 MG/DL (ref 0–199)
CHOLESTEROL/HDL RATIO: 3.9
CO2 SERPL-SCNC: 29 MMOL/L (ref 21–32)
CREAT SERPL-MCNC: 1.23 MG/DL (ref 0.5–1.3)
CREAT UR-MCNC: 68.1 MG/DL (ref 20–370)
EOSINOPHIL # BLD AUTO: 0.25 X10*3/UL (ref 0–0.4)
EOSINOPHIL NFR BLD AUTO: 4.6 %
ERYTHROCYTE [DISTWIDTH] IN BLOOD BY AUTOMATED COUNT: 14.3 % (ref 11.5–14.5)
EST. AVERAGE GLUCOSE BLD GHB EST-MCNC: 174 MG/DL
GFR SERPL CREATININE-BSD FRML MDRD: 63 ML/MIN/1.73M*2
GLUCOSE SERPL-MCNC: 180 MG/DL (ref 74–99)
HBA1C MFR BLD: 7.7 %
HCT VFR BLD AUTO: 45.1 % (ref 41–52)
HCV AB SER QL: NONREACTIVE
HDLC SERPL-MCNC: 36.8 MG/DL
HGB BLD-MCNC: 14.5 G/DL (ref 13.5–17.5)
IMM GRANULOCYTES # BLD AUTO: 0.02 X10*3/UL (ref 0–0.5)
IMM GRANULOCYTES NFR BLD AUTO: 0.4 % (ref 0–0.9)
LDLC SERPL CALC-MCNC: 63 MG/DL
LYMPHOCYTES # BLD AUTO: 1.5 X10*3/UL (ref 0.8–3)
LYMPHOCYTES NFR BLD AUTO: 27.5 %
MCH RBC QN AUTO: 30.2 PG (ref 26–34)
MCHC RBC AUTO-ENTMCNC: 32.2 G/DL (ref 32–36)
MCV RBC AUTO: 94 FL (ref 80–100)
MICROALBUMIN UR-MCNC: 11.7 MG/L
MICROALBUMIN/CREAT UR: 17.2 UG/MG CREAT
MONOCYTES # BLD AUTO: 0.58 X10*3/UL (ref 0.05–0.8)
MONOCYTES NFR BLD AUTO: 10.6 %
NEUTROPHILS # BLD AUTO: 3.07 X10*3/UL (ref 1.6–5.5)
NEUTROPHILS NFR BLD AUTO: 56.2 %
NON HDL CHOLESTEROL: 105 MG/DL (ref 0–149)
NRBC BLD-RTO: 0 /100 WBCS (ref 0–0)
PLATELET # BLD AUTO: 200 X10*3/UL (ref 150–450)
POTASSIUM SERPL-SCNC: 4.1 MMOL/L (ref 3.5–5.3)
PROT SERPL-MCNC: 7.1 G/DL (ref 6.4–8.2)
RBC # BLD AUTO: 4.8 X10*6/UL (ref 4.5–5.9)
SODIUM SERPL-SCNC: 141 MMOL/L (ref 136–145)
TRIGL SERPL-MCNC: 212 MG/DL (ref 0–149)
TSH SERPL-ACNC: 2.58 MIU/L (ref 0.44–3.98)
URATE SERPL-MCNC: 7.8 MG/DL (ref 4–7.5)
VLDL: 42 MG/DL (ref 0–40)
WBC # BLD AUTO: 5.5 X10*3/UL (ref 4.4–11.3)

## 2023-12-18 PROCEDURE — 80061 LIPID PANEL: CPT

## 2023-12-18 PROCEDURE — 82652 VIT D 1 25-DIHYDROXY: CPT

## 2023-12-18 PROCEDURE — 82570 ASSAY OF URINE CREATININE: CPT

## 2023-12-18 PROCEDURE — 85025 COMPLETE CBC W/AUTO DIFF WBC: CPT

## 2023-12-18 PROCEDURE — 82330 ASSAY OF CALCIUM: CPT | Mod: PORLAB | Performed by: INTERNAL MEDICINE

## 2023-12-18 PROCEDURE — 36415 COLL VENOUS BLD VENIPUNCTURE: CPT

## 2023-12-18 PROCEDURE — 84550 ASSAY OF BLOOD/URIC ACID: CPT

## 2023-12-18 PROCEDURE — 82043 UR ALBUMIN QUANTITATIVE: CPT

## 2023-12-18 PROCEDURE — 84443 ASSAY THYROID STIM HORMONE: CPT

## 2023-12-18 PROCEDURE — 83036 HEMOGLOBIN GLYCOSYLATED A1C: CPT

## 2023-12-18 PROCEDURE — 80053 COMPREHEN METABOLIC PANEL: CPT

## 2023-12-18 PROCEDURE — 86803 HEPATITIS C AB TEST: CPT

## 2023-12-19 ENCOUNTER — APPOINTMENT (OUTPATIENT)
Dept: PRIMARY CARE | Facility: CLINIC | Age: 73
End: 2023-12-19
Payer: MEDICARE

## 2023-12-19 NOTE — PROGRESS NOTES
"Subjective   Isauro Valdez is a 73 y.o. male who presents for follow-up of Type 2 diabetes mellitus. The initial diagnosis of diabetes was made in 2015 .     He has had no major changes to his health since his last appointment.  He wobbles when walking.  His wife desires him to use a cane.  He has increased Gabapentin.      He eats at night or else he gets heartburn.      Known complications due to diabetes included peripheral neuropathy, CAD s/p MI in 2003 with 1 stent in situ, and chronic kidney disease    Cardiovascular risk factors include advanced age (older than 55 for men, 65 for women), diabetes mellitus, male gender, and obesity (BMI >= 30 kg/m2). The patient is on an ACE inhibitor or angiotensin II receptor blocker.  The patient has not been previously hospitalized due to diabetic ketoacidosis.     Current symptoms/problems include none. His clinical course has been stable.     The patient is currently checking the blood glucose.      Patient is using: glucometer  GLUCOSE LOG DATA:  149-312mg/dL     Hypoglycemia frequency: Denies   Hypoglycemia awareness: N/A     Current Medication Regimen  Bydureon 2mg SQ once weekly   Pioglitazone 45mg once daily   Glimepiride 1mg once daily   Jardiance 25mg once daily   Metformin 1000mig twice daily     MEALS: eating less   Breakfast - cinnamon roll today, hot cereal, eggs, oatmeal, boiled egg, cheese, fruit   Lunch - omits   Dinner 5pm - meat, vegetable, cottage cheese   Snacks - yogurt, cookies, half peanut butter and jelly   Beverages - water, coffee with Stevia     Denies exercise regimen        Review of Systems   Musculoskeletal:  Positive for arthralgias (Knees).       Objective   /64 (BP Location: Left arm, Patient Position: Sitting, BP Cuff Size: Adult)   Pulse 88   Ht 1.905 m (6' 3\")   Wt 123 kg (271 lb)   BMI 33.87 kg/m²   Physical Exam  Vitals and nursing note reviewed.   Constitutional:       General: He is not in acute distress.     Appearance: " Normal appearance. He is normal weight.   HENT:      Head: Normocephalic and atraumatic.      Nose: Nose normal.      Mouth/Throat:      Mouth: Mucous membranes are moist.   Eyes:      Extraocular Movements: Extraocular movements intact.   Cardiovascular:      Rate and Rhythm: Normal rate and regular rhythm.   Pulmonary:      Effort: Pulmonary effort is normal.      Breath sounds: Normal breath sounds.   Musculoskeletal:         General: Normal range of motion.   Skin:     General: Skin is warm.   Neurological:      Mental Status: He is alert and oriented to person, place, and time.   Psychiatric:         Mood and Affect: Mood normal.       Lab Review  Glucose (mg/dL)   Date Value   12/18/2023 180 (H)   06/12/2023 151 (H)   03/15/2023 146 (H)   12/16/2022 156 (H)     Hemoglobin A1C (%)   Date Value   12/18/2023 7.7 (H)   06/12/2023 7.4 (A)   12/16/2022 7.1 (A)   07/05/2022 6.8 (A)     Bicarbonate (mmol/L)   Date Value   12/18/2023 29   06/12/2023 27   03/15/2023 30   12/16/2022 26     Urea Nitrogen (mg/dL)   Date Value   12/18/2023 25 (H)   06/12/2023 28 (H)   03/15/2023 26 (H)   12/16/2022 34 (H)     Creatinine (mg/dL)   Date Value   12/18/2023 1.23   06/12/2023 1.24   03/15/2023 1.32 (H)   12/16/2022 1.38 (H)     Lab Results   Component Value Date    CHOL 142 12/18/2023    CHOL 119 06/12/2023    CHOL 126 12/16/2022     Lab Results   Component Value Date    HDL 36.8 12/18/2023    HDL 35.5 (A) 06/12/2023    HDL 37.0 (A) 12/16/2022     Lab Results   Component Value Date    LDLCALC 63 12/18/2023     Lab Results   Component Value Date    TRIG 212 (H) 12/18/2023    TRIG 160 (H) 06/12/2023    TRIG 189 (H) 12/16/2022       Health Maintenance:   Foot Exam: 3/18/2023  Eye Exam:  October 24, 2023  Urine Albumin: December 18, 2023    Assessment/Plan      73-year-old male presents for follow up for type 2 diabetes mellitus. His hemoglobin A1c was found to be 7.7% as of December 2023. His blood pressure is at goal. He is noted  to be on a statin. He has not noted to be on an ACE inhibitor.    Type 2 diabetes mellitus (CMS/HCC)  To continue Bydureon 2mg SQ once weekly   To continue Pioglitazone 45mg once daily   To increase Glimepiride to 1mg twice daily before breakfast and dinner   To continue Jardiance 25mg once daily   To continue Metformin 1000mg twice daily   Please check blood sugars once daily and keep a detailed log  Counseled to adopt an exercise regimen to include 150 minutes of moderate intensity exercise per week  Counseled that the goal A1C should be 7% or less  Counseled glycemic control is warranted to prevent microvascular complications  Please stick to a low carb diet , particularly at night   Please call should sugar values be less than 70mg/dL     For follow up in 6 months

## 2023-12-19 NOTE — PATIENT INSTRUCTIONS
Thank you for choosing [facility]  for your health care needs.  If you have any questions, concerns or medical needs, please feel free to contact our office at [phone].    Please ensure you complete your blood work one week before the next scheduled appointment.    To continue Bydureon 2mg SQ once weekly   To continue Pioglitazone 45mg once daily   To increase Glimepiride to 1mg twice daily before breakfast and dinner   To continue Jardiance 25mg once daily   To continue Metformin 1000mg twice daily   Please check blood sugars once daily and keep a detailed log  Counseled to adopt an exercise regimen to include 150 minutes of moderate intensity exercise per week  Counseled that the goal A1C should be 7% or less  Counseled glycemic control is warranted to prevent microvascular complications  Please stick to a low carb diet , particularly at night   Please call should sugar values be less than 70mg/dL   For follow up in 6 months       Private car

## 2023-12-20 ENCOUNTER — OFFICE VISIT (OUTPATIENT)
Dept: ENDOCRINOLOGY | Facility: CLINIC | Age: 73
End: 2023-12-20
Payer: MEDICARE

## 2023-12-20 VITALS
DIASTOLIC BLOOD PRESSURE: 64 MMHG | BODY MASS INDEX: 33.69 KG/M2 | WEIGHT: 271 LBS | HEIGHT: 75 IN | HEART RATE: 88 BPM | SYSTOLIC BLOOD PRESSURE: 102 MMHG

## 2023-12-20 DIAGNOSIS — E11.9 TYPE 2 DIABETES MELLITUS WITHOUT COMPLICATION, WITHOUT LONG-TERM CURRENT USE OF INSULIN (MULTI): Primary | ICD-10-CM

## 2023-12-20 DIAGNOSIS — E83.52 HYPERCALCEMIA: ICD-10-CM

## 2023-12-20 LAB — 1,25(OH)2D3 SERPL-MCNC: 27.1 PG/ML (ref 19.9–79.3)

## 2023-12-20 PROCEDURE — 3048F LDL-C <100 MG/DL: CPT | Performed by: INTERNAL MEDICINE

## 2023-12-20 PROCEDURE — 1159F MED LIST DOCD IN RCRD: CPT | Performed by: INTERNAL MEDICINE

## 2023-12-20 PROCEDURE — 1160F RVW MEDS BY RX/DR IN RCRD: CPT | Performed by: INTERNAL MEDICINE

## 2023-12-20 PROCEDURE — 99214 OFFICE O/P EST MOD 30 MIN: CPT | Performed by: INTERNAL MEDICINE

## 2023-12-20 PROCEDURE — 4010F ACE/ARB THERAPY RXD/TAKEN: CPT | Performed by: INTERNAL MEDICINE

## 2023-12-20 PROCEDURE — 3078F DIAST BP <80 MM HG: CPT | Performed by: INTERNAL MEDICINE

## 2023-12-20 PROCEDURE — 3051F HG A1C>EQUAL 7.0%<8.0%: CPT | Performed by: INTERNAL MEDICINE

## 2023-12-20 PROCEDURE — 1126F AMNT PAIN NOTED NONE PRSNT: CPT | Performed by: INTERNAL MEDICINE

## 2023-12-20 PROCEDURE — 3074F SYST BP LT 130 MM HG: CPT | Performed by: INTERNAL MEDICINE

## 2023-12-20 PROCEDURE — 1036F TOBACCO NON-USER: CPT | Performed by: INTERNAL MEDICINE

## 2023-12-20 PROCEDURE — 3061F NEG MICROALBUMINURIA REV: CPT | Performed by: INTERNAL MEDICINE

## 2023-12-20 RX ORDER — GLIMEPIRIDE 1 MG/1
1 TABLET ORAL
Qty: 180 TABLET | Refills: 1 | Status: SHIPPED | OUTPATIENT
Start: 2023-12-20 | End: 2024-06-17

## 2023-12-20 ASSESSMENT — ENCOUNTER SYMPTOMS: ARTHRALGIAS: 1

## 2023-12-24 NOTE — ASSESSMENT & PLAN NOTE
To continue Bydureon 2mg SQ once weekly   To continue Pioglitazone 45mg once daily   To increase Glimepiride to 1mg twice daily before breakfast and dinner   To continue Jardiance 25mg once daily   To continue Metformin 1000mg twice daily   Please check blood sugars once daily and keep a detailed log  Counseled to adopt an exercise regimen to include 150 minutes of moderate intensity exercise per week  Counseled that the goal A1C should be 7% or less  Counseled glycemic control is warranted to prevent microvascular complications  Please stick to a low carb diet , particularly at night   Please call should sugar values be less than 70mg/dL     For follow up in 6 months

## 2024-01-09 ENCOUNTER — OFFICE VISIT (OUTPATIENT)
Dept: PRIMARY CARE | Facility: CLINIC | Age: 74
End: 2024-01-09
Payer: MEDICARE

## 2024-01-09 VITALS
WEIGHT: 272.9 LBS | HEART RATE: 90 BPM | TEMPERATURE: 97.3 F | DIASTOLIC BLOOD PRESSURE: 82 MMHG | SYSTOLIC BLOOD PRESSURE: 130 MMHG | BODY MASS INDEX: 33.93 KG/M2 | HEIGHT: 75 IN | OXYGEN SATURATION: 96 % | RESPIRATION RATE: 16 BRPM

## 2024-01-09 DIAGNOSIS — E11.65 POORLY CONTROLLED TYPE 2 DIABETES MELLITUS (MULTI): ICD-10-CM

## 2024-01-09 DIAGNOSIS — M10.9 GOUTY ARTHRITIS OF LEFT HAND: ICD-10-CM

## 2024-01-09 DIAGNOSIS — E83.52 HYPERCALCEMIA: Primary | ICD-10-CM

## 2024-01-09 DIAGNOSIS — K76.0 FATTY LIVER: ICD-10-CM

## 2024-01-09 DIAGNOSIS — E79.0 HYPERURICEMIA: ICD-10-CM

## 2024-01-09 PROBLEM — E08.621: Status: ACTIVE | Noted: 2024-01-09

## 2024-01-09 PROCEDURE — 99214 OFFICE O/P EST MOD 30 MIN: CPT | Performed by: INTERNAL MEDICINE

## 2024-01-09 PROCEDURE — 1159F MED LIST DOCD IN RCRD: CPT | Performed by: INTERNAL MEDICINE

## 2024-01-09 PROCEDURE — 1126F AMNT PAIN NOTED NONE PRSNT: CPT | Performed by: INTERNAL MEDICINE

## 2024-01-09 PROCEDURE — 3075F SYST BP GE 130 - 139MM HG: CPT | Performed by: INTERNAL MEDICINE

## 2024-01-09 PROCEDURE — 3079F DIAST BP 80-89 MM HG: CPT | Performed by: INTERNAL MEDICINE

## 2024-01-09 PROCEDURE — 1036F TOBACCO NON-USER: CPT | Performed by: INTERNAL MEDICINE

## 2024-01-09 PROCEDURE — 4010F ACE/ARB THERAPY RXD/TAKEN: CPT | Performed by: INTERNAL MEDICINE

## 2024-01-09 RX ORDER — ALLOPURINOL 100 MG/1
200 TABLET ORAL DAILY
Qty: 180 TABLET | Refills: 1 | Status: SHIPPED | OUTPATIENT
Start: 2024-01-09 | End: 2025-01-08

## 2024-01-09 RX ORDER — COLCHICINE 0.6 MG/1
0.6 TABLET ORAL 2 TIMES DAILY
Qty: 60 TABLET | Refills: 0 | Status: SHIPPED | OUTPATIENT
Start: 2024-01-09 | End: 2024-01-22

## 2024-01-09 ASSESSMENT — LIFESTYLE VARIABLES
AUDIT-C TOTAL SCORE: 0
HOW OFTEN DO YOU HAVE SIX OR MORE DRINKS ON ONE OCCASION: NEVER
HOW OFTEN DO YOU HAVE A DRINK CONTAINING ALCOHOL: NEVER
HOW MANY STANDARD DRINKS CONTAINING ALCOHOL DO YOU HAVE ON A TYPICAL DAY: PATIENT DOES NOT DRINK
SKIP TO QUESTIONS 9-10: 1

## 2024-01-09 ASSESSMENT — PATIENT HEALTH QUESTIONNAIRE - PHQ9
2. FEELING DOWN, DEPRESSED OR HOPELESS: NOT AT ALL
1. LITTLE INTEREST OR PLEASURE IN DOING THINGS: NOT AT ALL
SUM OF ALL RESPONSES TO PHQ9 QUESTIONS 1 & 2: 0

## 2024-01-09 NOTE — PROGRESS NOTES
"Chief Complaint/HPI:    Acute visit: acute arthritis - patient has had multiple episodes of gout in the past. Patient developed swelling \"the size of a golf ball\" about 3 weeks ago, involving the left hand. He takes allopurinol, it has prevented  episodes in the past. He has only used Tylenol  for treatment of acute pain, it has not been very helpful.  Patient cannot tolerate NSAIDS due to stomach issues in the past    Hypercalcemia: patient has an elevated ionized calcium    DM type 2: patient sees Dr Piero TERRY otherwise negative aside from what was mentioned above in HPI.      Patient Active Problem List   Diagnosis    Unknown cause of injury    Obstructive sleep apnea syndrome in adult    PAF (paroxysmal atrial fibrillation) (CMS/HCC)    Personal history of colonic polyps    Poorly controlled type 2 diabetes mellitus (CMS/HCC)    Type 2 diabetes mellitus (CMS/HCC)    Presence of coronary angioplasty implant and graft    Primary osteoarthritis of left hand    Renal mass    Restless legs syndrome    Trigger middle finger of left hand    Swelling of left hand    Seasonal allergies    Obesity    Low back pain    Joint swelling    Fatty liver    History of cardiomyopathy    Hypercalcemia    Hyperlipidemia    Anemia    ASHD (arteriosclerotic heart disease)    Hypertrophy of inter-atrial septum    Atopic dermatitis    Benign essential hypertension    BPH (benign prostatic hyperplasia)    Cardiac disease    Cellulitis of hand, left    Chronic combined systolic and diastolic CHF (congestive heart failure) (CMS/HCC)    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)    Chronic GERD    Disorder of iron metabolism    ACE-inhibitor cough    Dysuria    Nodule in muscle    Routine general medical examination at health care facility    Diabetes mellitus due to underlying condition with foot ulcer (CODE) (CMS/HCC)    Hyperuricemia    Gouty arthritis of left hand         Past Medical History: "   Diagnosis Date    Cellulitis of left lower limb 09/06/2019    Cellulitis of left lower extremity    Corns and callosities 09/06/2019    Callus of foot    Coronary angioplasty status 09/06/2019    History of PTCA 1    Old myocardial infarction 09/06/2019    History of myocardial infarction    Other forms of dyspnea 02/19/2020    Dyspnea on exertion    Personal history of diabetic foot ulcer 09/06/2019    History of diabetic ulcer of foot    Personal history of other diseases of the circulatory system 10/07/2019    History of hypertension    Personal history of other drug therapy 09/06/2019    History of influenza vaccination    Personal history of other drug therapy 09/06/2019    History of hepatitis B vaccination    Personal history of other drug therapy 09/06/2019    History of pneumococcal vaccination    Personal history of other endocrine, nutritional and metabolic disease     History of type 2 diabetes mellitus    Personal history of other specified conditions 09/06/2019    History of persistent cough    Personal history of urinary calculi     History of kidney stones    Personal history of urinary calculi 09/06/2019    History of nephrolithiasis    Rash and other nonspecific skin eruption 12/27/2018    Rash of groin    Type 2 diabetes mellitus with diabetic nephropathy (CMS/HCC) 10/07/2019    Type 2 diabetes mellitus with diabetic nephropathy     Past Surgical History:   Procedure Laterality Date    ANKLE SURGERY  05/17/2017    Ankle Surgery    CORONARY ANGIOPLASTY  05/17/2017    PTCA    FOOT SURGERY  06/12/2018    Foot Surgery    KNEE ARTHROSCOPY W/ DEBRIDEMENT  05/17/2017    Arthroscopy Knee    LITHOTRIPSY  05/17/2017    Renal Lithotripsy    LUMBAR LAMINECTOMY  05/17/2017    Laminectomy Lumbar    OTHER SURGICAL HISTORY  05/17/2017    Biopsy Of Liver    OTHER SURGICAL HISTORY  09/01/2022    Tonsillectomy with adenoidectomy     Social History     Social History Narrative    Not on file          ALLERGIES  Adhesive tape-silicones, Niacin, and Penicillins      MEDICATIONS  Current Outpatient Medications on File Prior to Visit   Medication Sig Dispense Refill    amLODIPine (Norvasc) 2.5 mg tablet TAKE 1 TABLET BY MOUTH EVERYDAY AT BEDTIME 90 tablet 1    ascorbic acid (Vitamin C) 500 mg tablet Take by mouth.      aspirin 81 mg EC tablet Take by mouth.      carvedilol (Coreg) 25 mg tablet TAKE 1 TABLET BY MOUTH TWICE A DAY WITH MORNING AND EVENING MEAL 180 tablet 1    cloNIDine (Catapres) 0.1 mg tablet Take 1 tablet (0.1 mg) by mouth 2 times a day.      colestipol (Colestid) 1 gram tablet TAKE 2 TABLETS BY MOUTH TWICE A  tablet 0    exenatide microspheres (Bydureon BCise) 2 mg/0.85 mL auto-injector INJECT 2 MG SUBCUTANEOUSLY ONCE WEEKLY 12 each 3    famotidine (Pepcid) 20 mg tablet TAKE 1 TABLET BY MOUTH EVERY DAY AS DIRECTED 90 tablet 0    ferrous sulfate 325 (65 Fe) MG tablet Take 1 tablet by mouth early in the morning..      fish oil concentrate (Omega-3) 120-180 mg capsule Take 1 capsule (1 g) by mouth once daily.      furosemide (Lasix) 40 mg tablet TAKE 1 TABLET BY MOUTH EVERY DAY 90 tablet 3    gabapentin (Neurontin) 300 mg capsule TAKE 2 CAPSULES BY MOUTH EVERY DAY AT BEDTIME 180 capsule 3    glimepiride (Amaryl) 1 mg tablet Take 1 tablet (1 mg) by mouth 2 times a day before meals. 180 tablet 1    glucosamine-chondroit-vit C-Mn 500-400 mg capsule Take by mouth.      hydroCHLOROthiazide (HYDRODiuril) 25 mg tablet TAKE 1 TABLET BY MOUTH EVERY DAY 90 tablet 1    Jardiance 25 mg TAKE 1 TABLET BY MOUTH EVERY DAY 90 tablet 3    lansoprazole (Prevacid) 15 mg DR capsule Take 1 capsule (15 mg) by mouth once daily.      losartan (Cozaar) 100 mg tablet TAKE 1 TABLET BY MOUTH EVERY DAY 90 tablet 3    metFORMIN (Glucophage) 1,000 mg tablet TAKE 1 TABLET EVERY 12 HOURS DAILY 180 tablet 1    multivitamin with folic acid (One Daily Multivitamin) 400 mcg tablet Take 1 tablet by mouth once daily.       "OneTouch Ultra Test strip USE AS DIRECTED 3 TIMES A DAY      pioglitazone (Actos) 45 mg tablet TAKE 1 TABLET (45 MG) BY MOUTH ONCE DAILY 90 tablet 1    rosuvastatin (Crestor) 20 mg tablet Take 1 tablet (20 mg) by mouth once daily.      saw palmetto 450 mg capsule Take 1 capsule (450 mg) by mouth once daily.      turmeric-turmeric root extract 450-50 mg capsule Take 1 capsule by mouth once daily.      [DISCONTINUED] allopurinol (Zyloprim) 100 mg tablet Take 1 tablet (100 mg) by mouth once daily.      zinc gluconate 50 mg tablet Take 1 tablet (50 mg) by mouth once daily.      [DISCONTINUED] glucosamine-chondroitin 250-200 mg tablet Take 1 tablet by mouth once daily.      [DISCONTINUED] multivitamin with minerals (multivitamin-iron-folic acid) tablet Take 1 tablet by mouth once daily.      [DISCONTINUED] triamcinolone (Kenalog) 0.1 % cream APPLY AND RUB IN A THIN FILM TO AFFECTED AREAS TWICE DAILY AS NEEDED IN AM AND PM (Patient not taking: Reported on 1/9/2024) 30 g 1     No current facility-administered medications on file prior to visit.         PHYSICAL EXAM  /82 (BP Location: Left arm, Patient Position: Sitting, BP Cuff Size: Adult)   Pulse 90   Temp 36.3 °C (97.3 °F)   Resp 16   Ht 1.905 m (6' 3\")   Wt 124 kg (272 lb 14.4 oz)   SpO2 96%   BMI 34.11 kg/m²   Body mass index is 34.11 kg/m².  Constitutional   General appearance: Abnormal.  well developed, well nourished, overweight, accompanied by wife, he has abdominal obesity.   Eyes   Inspection of eyes: Sclera and conjunctiva were normal. wearing glasses.   Ears, Nose, Mouth, and Throat   Ears: Auricles: Normal. No neck masses, no carotid bruits noted  Pulmonary   Respiratory assessment: No respiratory distress, normal respiratory rhythm and effort.    Auscultation of Lungs: Clear bilateral breath sounds.   Cardiovascular   Auscultation of heart: Apical pulse normal, heart rate and rhythm normal, normal S1 and S2, no murmurs and no pericardial rub. " The heart rate was normal. The rhythm was regular. Heart sounds: the heart sounds were distant, but normal S1 and normal S2. no murmurs were heard.    Exam for edema: No peripheral edema.   Lymphatic   Palpation of lymph nodes in neck: No cervical lymphadenopathy.   Skin   Examination of the skin for lesions: no obvious skin lesions are noted. He will follow up with Dr Arcelia KEMP:  Moderate swelling and tenderness of the left 2nd and 3rd MCP joints. The fingers were red an swelled recently per the patient, the swelling has subsided some since initial symptoms 3 weeks ago  Neurologic   Cranial nerves: Nerves 2-12 were intact, no focal neuro defects. wearing a mask.   Psychiatric   Orientation: Oriented to person, place, and time.    Mood and affect: Normal.     ASSESSMENT/PLAN  Problem List Items Addressed This Visit       Poorly controlled type 2 diabetes mellitus (CMS/HCC)    Current Assessment & Plan     Sees endocrinology for follow up         Fatty liver    Hypercalcemia - Primary    Current Assessment & Plan     Check a iPTH level, this is ongoing         Relevant Orders    PTH, intact    Hyperuricemia    Relevant Medications    colchicine 0.6 mg tablet    allopurinol (Zyloprim) 100 mg tablet    Other Relevant Orders    Sedimentation Rate    C-reactive protein    Rheumatoid factor    Gouty arthritis of left hand    Current Assessment & Plan     Patient appears to have slowly resolving gout of the left hand.  Trial of colchicine twice daily as ordered, when the pain and swelling resolves, will increase the dose of allopurinol .    Follow up as scheduled.           Relevant Medications    colchicine 0.6 mg tablet    allopurinol (Zyloprim) 100 mg tablet    Other Relevant Orders    Sedimentation Rate    C-reactive protein    Rheumatoid factor     Follow up as scheduled.      Javier Hong MD

## 2024-01-09 NOTE — ASSESSMENT & PLAN NOTE
Patient appears to have slowly resolving gout of the left hand.  Trial of colchicine twice daily as ordered, when the pain and swelling resolves, will increase the dose of allopurinol .    Follow up as scheduled.

## 2024-01-10 ENCOUNTER — LAB (OUTPATIENT)
Dept: LAB | Facility: LAB | Age: 74
End: 2024-01-10
Payer: MEDICARE

## 2024-01-10 DIAGNOSIS — E79.0 HYPERURICEMIA: ICD-10-CM

## 2024-01-10 DIAGNOSIS — M10.9 GOUTY ARTHRITIS OF LEFT HAND: ICD-10-CM

## 2024-01-10 DIAGNOSIS — E83.52 HYPERCALCEMIA: ICD-10-CM

## 2024-01-10 LAB
CRP SERPL-MCNC: 0.14 MG/DL
ERYTHROCYTE [SEDIMENTATION RATE] IN BLOOD BY WESTERGREN METHOD: 30 MM/H (ref 0–20)
RHEUMATOID FACT SER NEPH-ACNC: 51 IU/ML (ref 0–15)

## 2024-01-10 PROCEDURE — 86431 RHEUMATOID FACTOR QUANT: CPT

## 2024-01-10 PROCEDURE — 36415 COLL VENOUS BLD VENIPUNCTURE: CPT

## 2024-01-10 PROCEDURE — 86140 C-REACTIVE PROTEIN: CPT

## 2024-01-10 PROCEDURE — 85652 RBC SED RATE AUTOMATED: CPT

## 2024-01-10 PROCEDURE — 83970 ASSAY OF PARATHORMONE: CPT

## 2024-01-11 LAB — PTH-INTACT SERPL-MCNC: 64.5 PG/ML (ref 18.5–88)

## 2024-01-22 DIAGNOSIS — E79.0 HYPERURICEMIA: ICD-10-CM

## 2024-01-22 DIAGNOSIS — M10.9 GOUTY ARTHRITIS OF LEFT HAND: ICD-10-CM

## 2024-01-22 DIAGNOSIS — E78.5 HYPERLIPIDEMIA, UNSPECIFIED: ICD-10-CM

## 2024-01-22 DIAGNOSIS — I10 ESSENTIAL (PRIMARY) HYPERTENSION: ICD-10-CM

## 2024-01-22 RX ORDER — MONTELUKAST SODIUM 4 MG/1
TABLET, CHEWABLE ORAL
Qty: 360 TABLET | Refills: 0 | Status: SHIPPED | OUTPATIENT
Start: 2024-01-22 | End: 2024-06-03

## 2024-01-22 RX ORDER — CARVEDILOL 25 MG/1
TABLET ORAL
Qty: 180 TABLET | Refills: 1 | Status: SHIPPED | OUTPATIENT
Start: 2024-01-22

## 2024-01-22 RX ORDER — AMLODIPINE BESYLATE 2.5 MG/1
TABLET ORAL
Qty: 90 TABLET | Refills: 1 | Status: SHIPPED | OUTPATIENT
Start: 2024-01-22

## 2024-01-22 RX ORDER — ROSUVASTATIN CALCIUM 20 MG/1
20 TABLET, COATED ORAL DAILY
Qty: 90 TABLET | Refills: 3 | Status: SHIPPED | OUTPATIENT
Start: 2024-01-22

## 2024-01-22 RX ORDER — COLCHICINE 0.6 MG/1
TABLET ORAL
Qty: 60 TABLET | Refills: 0 | Status: SHIPPED | OUTPATIENT
Start: 2024-01-22 | End: 2024-03-07

## 2024-02-09 ENCOUNTER — LAB (OUTPATIENT)
Dept: LAB | Facility: LAB | Age: 74
End: 2024-02-09
Payer: MEDICARE

## 2024-02-14 ENCOUNTER — OFFICE VISIT (OUTPATIENT)
Dept: PRIMARY CARE | Facility: CLINIC | Age: 74
End: 2024-02-14
Payer: MEDICARE

## 2024-02-14 VITALS
TEMPERATURE: 97 F | HEART RATE: 99 BPM | DIASTOLIC BLOOD PRESSURE: 73 MMHG | BODY MASS INDEX: 33.62 KG/M2 | OXYGEN SATURATION: 99 % | HEIGHT: 75 IN | WEIGHT: 270.4 LBS | SYSTOLIC BLOOD PRESSURE: 125 MMHG | RESPIRATION RATE: 16 BRPM

## 2024-02-14 DIAGNOSIS — I50.42 CHRONIC COMBINED SYSTOLIC AND DIASTOLIC CHF (CONGESTIVE HEART FAILURE) (MULTI): ICD-10-CM

## 2024-02-14 DIAGNOSIS — E11.40 CONTROLLED TYPE 2 DIABETES MELLITUS WITH DIABETIC NEUROPATHY, WITHOUT LONG-TERM CURRENT USE OF INSULIN (MULTI): ICD-10-CM

## 2024-02-14 DIAGNOSIS — I48.0 PAF (PAROXYSMAL ATRIAL FIBRILLATION) (MULTI): Primary | ICD-10-CM

## 2024-02-14 DIAGNOSIS — E78.5 HYPERLIPIDEMIA, UNSPECIFIED HYPERLIPIDEMIA TYPE: ICD-10-CM

## 2024-02-14 DIAGNOSIS — M54.50 LOW BACK PAIN, UNSPECIFIED BACK PAIN LATERALITY, UNSPECIFIED CHRONICITY, UNSPECIFIED WHETHER SCIATICA PRESENT: ICD-10-CM

## 2024-02-14 DIAGNOSIS — I48.0 PAROXYSMAL ATRIAL FIBRILLATION (MULTI): ICD-10-CM

## 2024-02-14 DIAGNOSIS — E11.65 POORLY CONTROLLED TYPE 2 DIABETES MELLITUS (MULTI): ICD-10-CM

## 2024-02-14 DIAGNOSIS — E79.0 HYPERURICEMIA: ICD-10-CM

## 2024-02-14 PROBLEM — K57.30 DIVERTICULOSIS OF LARGE INTESTINE: Status: ACTIVE | Noted: 2022-06-28

## 2024-02-14 PROBLEM — K64.9 HEMORRHOIDS: Status: ACTIVE | Noted: 2024-02-14

## 2024-02-14 PROBLEM — K63.5 POLYP OF COLON: Status: ACTIVE | Noted: 2024-02-14

## 2024-02-14 PROBLEM — M10.9 GOUTY ARTHRITIS OF TOE: Status: ACTIVE | Noted: 2023-12-18

## 2024-02-14 PROBLEM — E08.621: Status: RESOLVED | Noted: 2024-01-09 | Resolved: 2024-02-14

## 2024-02-14 PROCEDURE — 1159F MED LIST DOCD IN RCRD: CPT | Performed by: INTERNAL MEDICINE

## 2024-02-14 PROCEDURE — 3078F DIAST BP <80 MM HG: CPT | Performed by: INTERNAL MEDICINE

## 2024-02-14 PROCEDURE — 93000 ELECTROCARDIOGRAM COMPLETE: CPT | Performed by: INTERNAL MEDICINE

## 2024-02-14 PROCEDURE — 4010F ACE/ARB THERAPY RXD/TAKEN: CPT | Performed by: INTERNAL MEDICINE

## 2024-02-14 PROCEDURE — 1036F TOBACCO NON-USER: CPT | Performed by: INTERNAL MEDICINE

## 2024-02-14 PROCEDURE — 3074F SYST BP LT 130 MM HG: CPT | Performed by: INTERNAL MEDICINE

## 2024-02-14 PROCEDURE — 1126F AMNT PAIN NOTED NONE PRSNT: CPT | Performed by: INTERNAL MEDICINE

## 2024-02-14 PROCEDURE — 1123F ACP DISCUSS/DSCN MKR DOCD: CPT | Performed by: INTERNAL MEDICINE

## 2024-02-14 PROCEDURE — 99214 OFFICE O/P EST MOD 30 MIN: CPT | Performed by: INTERNAL MEDICINE

## 2024-02-14 RX ORDER — TRIAMCINOLONE ACETONIDE 1 MG/G
CREAM TOPICAL 2 TIMES DAILY
COMMUNITY

## 2024-02-14 SDOH — ECONOMIC STABILITY: FOOD INSECURITY: WITHIN THE PAST 12 MONTHS, THE FOOD YOU BOUGHT JUST DIDN'T LAST AND YOU DIDN'T HAVE MONEY TO GET MORE.: NEVER TRUE

## 2024-02-14 SDOH — ECONOMIC STABILITY: FOOD INSECURITY: WITHIN THE PAST 12 MONTHS, YOU WORRIED THAT YOUR FOOD WOULD RUN OUT BEFORE YOU GOT MONEY TO BUY MORE.: NEVER TRUE

## 2024-02-14 ASSESSMENT — LIFESTYLE VARIABLES
HOW MANY STANDARD DRINKS CONTAINING ALCOHOL DO YOU HAVE ON A TYPICAL DAY: 1 OR 2
HOW OFTEN DO YOU HAVE SIX OR MORE DRINKS ON ONE OCCASION: NEVER
SKIP TO QUESTIONS 9-10: 1
HOW OFTEN DO YOU HAVE A DRINK CONTAINING ALCOHOL: MONTHLY OR LESS
AUDIT-C TOTAL SCORE: 1

## 2024-02-14 ASSESSMENT — PATIENT HEALTH QUESTIONNAIRE - PHQ9
1. LITTLE INTEREST OR PLEASURE IN DOING THINGS: NOT AT ALL
SUM OF ALL RESPONSES TO PHQ9 QUESTIONS 1 & 2: 0
2. FEELING DOWN, DEPRESSED OR HOPELESS: NOT AT ALL

## 2024-02-14 NOTE — ASSESSMENT & PLAN NOTE
Takes multiple meds, glucoses are still not well controlled, will refer for dietary assessment also

## 2024-02-14 NOTE — ASSESSMENT & PLAN NOTE
Patient walks with cane now, wife is concerned about fall risk, will refer to PT for an assessment, he may be able to exercise at [facility]

## 2024-02-14 NOTE — PROGRESS NOTES
Chief Complaint/HPI:    DM type 2: Patient takes Bydureon, Actos, metformin, Jardiance., glimepiride now, glucoses have improved , he has seen endocrinology also (Dr Shah). Glimepiride dose was increased by Dr Harry. Labs were completed in 12/2023, patient admits that he does not walk, he uses a cane now. Wife has been encouraging the patient to walk, he has a gym membership now.      CAD/ CHF/ HTN: he takes carvedilol, clonidine, HCTZ, Losartan, and amlodipine. Patient sees Dr Nunez also (cardiology), cough subsided with withdrawal of lisinopril     hyperlipidemia: he takes rosuvastatin and colestipol      back pain: patient takes gabapentin at bedtime, he takes 600 mg at bedtime, it helps with restless leg issues also per recommendation of Dr Angel (sleep medicine), he now takes iron once daily also to help with restless legs. he does have an iron deficiency      Colon polyps: he had a follow up colonoscopy completed     BPH/ spot on the kidney: he takes saw palmetto, he sees Dr Thompson     Gout: he now takes allopurinol, 200 mg daily, he uses colchicine as needed, most recent uric acid level was 7.8, recent episode of gouty arthritis symptoms were treated, patient feels better now.     ROS otherwise negative aside from what was mentioned above in HPI.      Patient Active Problem List   Diagnosis    Unknown cause of injury    Obstructive sleep apnea syndrome in adult    PAF (paroxysmal atrial fibrillation) (CMS/HCC)    Personal history of colonic polyps    Poorly controlled type 2 diabetes mellitus (CMS/HCC)    Type 2 diabetes mellitus (CMS/HCC)    Presence of coronary angioplasty implant and graft    Primary osteoarthritis of left hand    Renal mass    Restless legs syndrome    Trigger middle finger of left hand    Swelling of left hand    Seasonal allergies    Obesity    Low back pain    Joint swelling    Fatty liver    History of cardiomyopathy    Hypercalcemia    Hyperlipidemia    Anemia    ASHD  (arteriosclerotic heart disease)    Hypertrophy of inter-atrial septum    Atopic dermatitis    Benign essential hypertension    BPH (benign prostatic hyperplasia)    Cardiac disease    Cellulitis of hand, left    Chronic combined systolic and diastolic CHF (congestive heart failure) (CMS/HCC)    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)    Chronic GERD    Disorder of iron metabolism    ACE-inhibitor cough    Dysuria    Nodule in muscle    Routine general medical examination at health care facility    Hyperuricemia    Gouty arthritis of left hand    Diverticulosis of large intestine    Gouty arthritis of toe    Hemorrhoids    Polyp of colon         Past Medical History:   Diagnosis Date    Cellulitis of left lower limb 09/06/2019    Cellulitis of left lower extremity    Corns and callosities 09/06/2019    Callus of foot    Coronary angioplasty status 09/06/2019    History of PTCA 1    Old myocardial infarction 09/06/2019    History of myocardial infarction    Other forms of dyspnea 02/19/2020    Dyspnea on exertion    Personal history of diabetic foot ulcer 09/06/2019    History of diabetic ulcer of foot    Personal history of other diseases of the circulatory system 10/07/2019    History of hypertension    Personal history of other drug therapy 09/06/2019    History of influenza vaccination    Personal history of other drug therapy 09/06/2019    History of hepatitis B vaccination    Personal history of other drug therapy 09/06/2019    History of pneumococcal vaccination    Personal history of other endocrine, nutritional and metabolic disease     History of type 2 diabetes mellitus    Personal history of other specified conditions 09/06/2019    History of persistent cough    Personal history of urinary calculi     History of kidney stones    Personal history of urinary calculi 09/06/2019    History of nephrolithiasis    Rash and other nonspecific skin eruption 12/27/2018    Rash of  groin    Type 2 diabetes mellitus with diabetic nephropathy (CMS/[facility]) 10/07/2019    Type 2 diabetes mellitus with diabetic nephropathy     Past Surgical History:   Procedure Laterality Date    ANKLE SURGERY  05/17/2017    Ankle Surgery    CORONARY ANGIOPLASTY  05/17/2017    PTCA    FOOT SURGERY  06/12/2018    Foot Surgery    KNEE ARTHROSCOPY W/ DEBRIDEMENT  05/17/2017    Arthroscopy Knee    LITHOTRIPSY  05/17/2017    Renal Lithotripsy    LUMBAR LAMINECTOMY  05/17/2017    Laminectomy Lumbar    OTHER SURGICAL HISTORY  05/17/2017    Biopsy Of Liver    OTHER SURGICAL HISTORY  09/01/2022    Tonsillectomy with adenoidectomy     Social History     Social History Narrative    Not on file         ALLERGIES  Adhesive tape-silicones, Niacin, Nystatin, and Penicillins      MEDICATIONS  Current Outpatient Medications on File Prior to Visit   Medication Sig Dispense Refill    allopurinol (Zyloprim) 100 mg tablet Take 2 tablets (200 mg) by mouth once daily. 180 tablet 1    amLODIPine (Norvasc) 2.5 mg tablet TAKE 1 TABLET BY MOUTH EVERYDAY AT BEDTIME 90 tablet 1    ascorbic acid (Vitamin C) 500 mg tablet Take by mouth.      aspirin 81 mg EC tablet Take by mouth.      carvedilol (Coreg) 25 mg tablet TAKE 1 TABLET BY MOUTH TWICE A DAY WITH MORNING AND EVENING MEAL 180 tablet 1    cloNIDine (Catapres) 0.1 mg tablet Take 1 tablet (0.1 mg) by mouth 2 times a day.      colchicine 0.6 mg tablet TAKE 1 TABLET BY MOUTH BY MOUTH 2 TIMES A DAY. (Patient taking differently: Take 1 tablet (0.6 mg) by mouth if needed.) 60 tablet 0    colestipol (Colestid) 1 gram tablet TAKE 2 TABLETS BY MOUTH TWICE A  tablet 0    exenatide microspheres (Bydureon BCise) 2 mg/0.85 mL auto-injector INJECT 2 MG SUBCUTANEOUSLY ONCE WEEKLY 12 each 3    famotidine (Pepcid) 20 mg tablet TAKE 1 TABLET BY MOUTH EVERY DAY AS DIRECTED 90 tablet 0    ferrous sulfate 325 (65 Fe) MG tablet Take 1 tablet by mouth early in the morning..      fish oil concentrate (Omega-3)  "120-180 mg capsule Take 1 capsule (1 g) by mouth once daily.      furosemide (Lasix) 40 mg tablet TAKE 1 TABLET BY MOUTH EVERY DAY 90 tablet 3    gabapentin (Neurontin) 300 mg capsule TAKE 2 CAPSULES BY MOUTH EVERY DAY AT BEDTIME 180 capsule 3    glimepiride (Amaryl) 1 mg tablet Take 1 tablet (1 mg) by mouth 2 times a day before meals. 180 tablet 1    glucosamine-chondroit-vit C-Mn 500-400 mg capsule Take by mouth.      hydroCHLOROthiazide (HYDRODiuril) 25 mg tablet TAKE 1 TABLET BY MOUTH EVERY DAY 90 tablet 1    lansoprazole (Prevacid) 15 mg DR capsule Take 1 capsule (15 mg) by mouth once daily.      losartan (Cozaar) 100 mg tablet TAKE 1 TABLET BY MOUTH EVERY DAY 90 tablet 3    metFORMIN (Glucophage) 1,000 mg tablet TAKE 1 TABLET EVERY 12 HOURS DAILY 180 tablet 1    multivitamin with folic acid (One Daily Multivitamin) 400 mcg tablet Take 1 tablet by mouth once daily.      OneTouch Ultra Test strip USE AS DIRECTED 3 TIMES A DAY      pioglitazone (Actos) 45 mg tablet TAKE 1 TABLET (45 MG) BY MOUTH ONCE DAILY 90 tablet 1    rosuvastatin (Crestor) 20 mg tablet TAKE 1 TABLET BY MOUTH EVERY DAY 90 tablet 3    saw palmetto 450 mg capsule Take 1 capsule (450 mg) by mouth once daily.      triamcinolone (Kenalog) 0.1 % cream Apply topically 2 times a day.      turmeric-turmeric root extract 450-50 mg capsule Take 1 capsule by mouth once daily.      zinc gluconate 50 mg tablet Take 1 tablet (50 mg) by mouth once daily.      [DISCONTINUED] Jardiance 25 mg TAKE 1 TABLET BY MOUTH EVERY DAY 90 tablet 3     No current facility-administered medications on file prior to visit.         PHYSICAL EXAM  /73 (BP Location: Left arm, Patient Position: Sitting, BP Cuff Size: Adult)   Pulse 99   Temp 36.1 °C (97 °F)   Resp 16   Ht 1.905 m (6' 3\")   Wt 123 kg (270 lb 6.4 oz)   SpO2 99%   BMI 33.80 kg/m²   Body mass index is 33.8 kg/m².  Gen: Alert, NAD, he is obese, accompanied by wife  HEENT:  PERRLA, EOMI, conjunctiva and " sclera normal in appearance, wearing glasses, no thyromegaly or neck masses  Respiratory:  Lungs CTAB  Cardiovascular:  Heart: irregularly irregular rate and rhythm noted today . No M/R/G, no carotid bruits, no peripheral edema noted  Neuro:  Gross motor and sensory intact  Skin:  No suspicious lesions present    ASSESSMENT/PLAN  Problem List Items Addressed This Visit       PAF (paroxysmal atrial fibrillation) (CMS/[facility]) - Primary    Current Assessment & Plan     Irregular HR today, he takes no anticoagulant therapy, check an ECG now         Relevant Orders    CBC and Auto Differential    Comprehensive Metabolic Panel    TSH with reflex to Free T4 if abnormal    Poorly controlled type 2 diabetes mellitus (CMS/[facility])    Current Assessment & Plan     Takes multiple meds, glucoses are still not well controlled, will refer for dietary assessment also         Relevant Orders    CBC and Auto Differential    Comprehensive Metabolic Panel    Hemoglobin A1C    Albumin , Urine Random    Referral to Nutrition Services    Low back pain    Current Assessment & Plan     Patient walks with cane now, wife is concerned about fall risk, will refer to PT for an assessment, he may be able to exercise at health club         Relevant Orders    CBC and Auto Differential    Comprehensive Metabolic Panel    Referral to Physical Therapy    Hyperlipidemia    Relevant Orders    Lipid Panel    Referral to Nutrition Services    Chronic combined systolic and diastolic CHF (congestive heart failure) (CMS/[facility])    Current Assessment & Plan     Sees cardiology, takes multiple meds          Relevant Orders    CBC and Auto Differential    Comprehensive Metabolic Panel    Referral to Nutrition Services    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 (CMS/[facility])    Relevant Orders    CBC and Auto Differential    Comprehensive Metabolic Panel    Hyperuricemia    Current Assessment & Plan     No change in med therapy for now, dietary  assessment ordered, check labs         Relevant Orders    Uric acid    Referral to Nutrition Services     Other Visit Diagnoses       Paroxysmal atrial fibrillation (CMS/HCC)        Relevant Medications    empagliflozin (Jardiance) 25 mg    apixaban (Eliquis) 5 mg tablet    Other Relevant Orders    ECG 12 lead (Clinic Performed) (Completed)    CBC and Auto Differential    Comprehensive Metabolic Panel          Check an ECG now.  ECG is consistent with atrial fibrillation. Refer to cardiology for re assessment. Will initiate Eliquis therapy, 5 mg BID now to reduce CVA risk.    Follow up as scheduled.      Javier Hong MD

## 2024-02-15 ENCOUNTER — OFFICE VISIT (OUTPATIENT)
Dept: UROLOGY | Facility: CLINIC | Age: 74
End: 2024-02-15
Payer: MEDICARE

## 2024-02-15 ENCOUNTER — LAB (OUTPATIENT)
Dept: LAB | Facility: LAB | Age: 74
End: 2024-02-15
Payer: MEDICARE

## 2024-02-15 VITALS — SYSTOLIC BLOOD PRESSURE: 120 MMHG | DIASTOLIC BLOOD PRESSURE: 74 MMHG | HEART RATE: 84 BPM

## 2024-02-15 DIAGNOSIS — Z12.5 SCREENING PSA (PROSTATE SPECIFIC ANTIGEN): ICD-10-CM

## 2024-02-15 DIAGNOSIS — Z12.5 SCREENING PSA (PROSTATE SPECIFIC ANTIGEN): Primary | ICD-10-CM

## 2024-02-15 DIAGNOSIS — N40.1 BENIGN PROSTATIC HYPERPLASIA WITH LOWER URINARY TRACT SYMPTOMS, SYMPTOM DETAILS UNSPECIFIED: ICD-10-CM

## 2024-02-15 LAB
POC BILIRUBIN, URINE: NEGATIVE
POC BLOOD, URINE: NEGATIVE
POC GLUCOSE, URINE: ABNORMAL MG/DL
POC KETONES, URINE: NEGATIVE MG/DL
POC LEUKOCYTES, URINE: NEGATIVE
POC NITRITE,URINE: NEGATIVE
POC PH, URINE: 5.5 PH
POC PROTEIN, URINE: NEGATIVE MG/DL
POC SPECIFIC GRAVITY, URINE: 1.01
POC UROBILINOGEN, URINE: 0.2 EU/DL
PSA SERPL-MCNC: 2.36 NG/ML

## 2024-02-15 PROCEDURE — 1036F TOBACCO NON-USER: CPT | Performed by: UROLOGY

## 2024-02-15 PROCEDURE — 1159F MED LIST DOCD IN RCRD: CPT | Performed by: UROLOGY

## 2024-02-15 PROCEDURE — 99213 OFFICE O/P EST LOW 20 MIN: CPT | Performed by: UROLOGY

## 2024-02-15 PROCEDURE — 1126F AMNT PAIN NOTED NONE PRSNT: CPT | Performed by: UROLOGY

## 2024-02-15 PROCEDURE — 3074F SYST BP LT 130 MM HG: CPT | Performed by: UROLOGY

## 2024-02-15 PROCEDURE — 1123F ACP DISCUSS/DSCN MKR DOCD: CPT | Performed by: UROLOGY

## 2024-02-15 PROCEDURE — 4010F ACE/ARB THERAPY RXD/TAKEN: CPT | Performed by: UROLOGY

## 2024-02-15 PROCEDURE — 3078F DIAST BP <80 MM HG: CPT | Performed by: UROLOGY

## 2024-02-15 PROCEDURE — G0103 PSA SCREENING: HCPCS

## 2024-02-15 PROCEDURE — 81003 URINALYSIS AUTO W/O SCOPE: CPT | Performed by: UROLOGY

## 2024-02-15 NOTE — PROGRESS NOTES
02/15/2024  Voiding well on saw palmetto, nocturia x 0    Patient has no nausea, no vomiting, no fever.    COY: Deferred    PSA: Pending    We discussed the BPH and mild voiding symptom, continue saw palmetto  We discussed PSA screening, no more PSA check in the future  All the questions were answered, the patient expressed understanding and agreed to the plan.    Impression  BPH  PSA screening  Right renal mass- stable for over 6 years     Plan  PSA today  No more PSA in the future  Yearly follow-up with PCP  Call if problem    Chief Complaint   Patient presents with    Benign Prostatic Hypertrophy     Patient here today for yearly BPH Follow up.  PSA  2/7/2023  Value: 1.44  Voiding well, Nocturia x0.  Patient states he is still taking Saw Palmetto for BPH.  Plan  Last visit plan  Yearly COY and PSA  Watch voiding          Physical Exam     TODAYS LAB RESULTS:     Latest Reference Range & Units 02/15/24 08:57   POC Specific Gravity, Urine 1.005 - 1.035  1.015   POC PH, Urine No Reference Range Established PH 5.5   POC Protein, Urine NEGATIVE, 30 (1+) mg/dl NEGATIVE   POC Glucose, Urine NEGATIVE mg/dl >=1000 (4+) !   POC Blood, Urine NEGATIVE  NEGATIVE   POC Ketones, Urine NEGATIVE mg/dl NEGATIVE   POC Bilirubin, Urine NEGATIVE  NEGATIVE   POC Urobilinogen, Urine 0.2, 1.0 EU/DL 0.2   POC Leukocytes, Urine NEGATIVE  NEGATIVE   !: Data is abnormal  ASSESSMENT&PLAN:      IMPRESSIONS:     02/16/2023  Voiding well, nocturia 0-1     Patient has no nausea, no vomiting, no fever.     COY: 1+, no nodule     PSA: Normal     Patient here for 1 year follow up for BPH  Last PSA done 02/07/23 1.44   Denies urgency, frequency, dysuria, hematuria, nocturia. Patient feels that he empties bladder well. --Debo     IO Glucose - Urine 1000 mg/dl REQUIRED    IO Bilirubin Negative REQUIRED    IO Ketones Negative REQUIRED    IO Specific Gravity 1.010 REQUIRED    IO Blood Negative REQUIRED    IO pH 5.5 REQUIRED    IO Protein, Urine  Negative REQUIRED    IO Urobilinogen Normal (0.2-1.0 mg/dl) REQUIRED    IO Nitrite, Urine Negative REQUIRED    IO Leukocytes Negative      We discussed benign prostate hypertrophy, with a mild voiding symptom  We discussed cessation of the PSA check due to the age, but patient preferred to continue  All the questions were answered, the patient expressed understanding and agreed to the plan.     Impression  BPH  PSA screening  Right renal mass- stable for over 5 years     Plan  Yearly COY and PSA  Watch voiding        02/14/22  Patient here for 1 year follow up for BPH, last PSA done 02/11/22 1.69, patient doing well.      Denies nocturia and trouble with voiding   no nausea, no vomiting, no fever      COY: 1+ enlarged prostate, smooth, no nodules      PSA 02/11/22 - 1.69ng/mL     IO UA (automated w/o microscopy) 02/14/22   Test Result    IO Glucose - Urine 1000 mg/dl    IO Bilirubin Negative    IO Ketones Negative    IO Specific Gravity 1.015    IO Blood Negative    IO pH 5.5    IO Protein, Urine Negative    IO Urobilinogen Normal (0.2-1.0 mg/dl)    IO Nitrite, Urine Negative    IO Leukocytes Negative      All the questions were answered, the patient expressed understanding and agreed to the plan.     Impression  BPH  PSA screening  Right renal mass- stable for over 5 years     Plan  Yearly COY and PSA  Watch voiding           02/08/21  Voiding well, nocturia 0-1     Patient has no nausea, no vomiting, no fever.     COY: Deferred     PSA 2/01/21 1.47     IO UA (automated w/o microscopy)           [id number] 10:21AM          Harsh Thompson     Test Name       Result     Flag        Reference  IO Glucose - Urine         500 mg/dl                              IO Bilirubin       Negative                  IO Ketones      Negative                  IO Specific Gravity         1.015                       IO Blood          Negative                  IO pH               5.0                           IO Protein, Urine             Negative                  IO Urobilinogen                                              Normal (0.2-1.0 mg/dl)  IO Nitrite, Urine              Negative                  IO Leukocytes Negative                  IO Glucose - Urine         500 mg/dl                              IO Bilirubin       Negative                  IO Ketones      Negative                  IO Specific Gravity         1.015                       IO Blood          Negative                  IO pH               5.0                           IO Protein, Urine            Negative                  IO Urobilinogen                                              Normal (0.2-1.0 mg/dl)  IO Nitrite, Urine              Negative                  IO Leukocytes Negative      We discussed benign prostate hypertrophy with a mild voiding symptoms, on saw palmetto  All the questions were answered, the patient expressed understanding and agreed to the plan.     Impression  BPH  PSA screening  Right renal mass- stable for over 5 years     Plan  Yearly COY and PSA  Watch voiding                                           02/03/2020  Voiding well on saw palmetto, complaining of ED     Patient has no nausea, no vomiting, no fever.     COY: 1+, no nodule     PSA: 1.16     CT with and without contrast  IMPRESSION:  1. 23 mm enhancing mass in the right kidney is stable for greater  than 5 years.  2. No acute findings of the abdomen or pelvis.        IO UA (automated w/o microscopy)           [id number] 10:36AM          Harsh Thompson     Test Name       Result     Flag        Reference  IO Glucose - Urine         Negative                Normal  IO Bilirubin       Negative                Negative  IO Ketones      Negative                Negative  IO Specific Gravity         1.015                     1.000-1.030  IO Blood          Negative                Negative  IO pH               5.0                         5.0-8.0  IO Protein, Urine            Negative                Negative  IO  Urobilinogen                                            Normal  Normal (0.2-1.0 mg/dl)  IO Nitrite, Urine              Negative                Negative  IO Leukocytes Negative                Negative  IO Glucose - Urine         Negative                Normal  IO Bilirubin       Negative                Negative  IO Ketones      Negative                Negative  IO Specific Gravity         1.015                     1.000-1.030  IO Blood          Negative                Negative  IO pH               5.0                         5.0-8.0  IO Protein, Urine            Negative                Negative  IO Urobilinogen                                            Normal  Normal (0.2-1.0 mg/dl)  IO Nitrite, Urine              Negative                Negative  IO Leukocytes Negative                Negative     We discussed benign prostate hypertrophy with a mild voiding symptoms, on saw palmetto  We discussed erectile dysfunction, Viagra or Cialis or Levitra trial, the need cardiology clearance  All the questions were answered, the patient expressed understanding and agreed to the plan.     Impression  BPH  ED  PSA screening  Right renal mass- stable for over 5 years     Plan  Patient will see cardiologist for clearance  Possible Viagra in the future  Yearly COY and PSA                                                     12/27/18 H BM   68-year-old male who is here for his annual checkup for an enlarged prostate. The patient is using saw palmetto and states that he is voiding without difficulty he has nocturia Ã--0-1 he denies hesitancy intermittency incomplete bladder emptying frequency or urinary urgency. He denies blood or burning on urination. Patient has a 2 cm renal mass in the right kidney the most recent CT scan with contrast done in November 2018 shows the mass to be stable in size we will repeat CT scan in 1 year.        PSA  02/07/23 1.44  2/01/21 1.47   1/2/20 1.16.  02/11/22 1.69

## 2024-02-21 ENCOUNTER — NUTRITION (OUTPATIENT)
Dept: NUTRITION | Facility: CLINIC | Age: 74
End: 2024-02-21
Payer: MEDICARE

## 2024-02-21 DIAGNOSIS — E78.5 HYPERLIPIDEMIA, UNSPECIFIED HYPERLIPIDEMIA TYPE: ICD-10-CM

## 2024-02-21 DIAGNOSIS — E11.65 POORLY CONTROLLED TYPE 2 DIABETES MELLITUS (MULTI): ICD-10-CM

## 2024-02-21 DIAGNOSIS — Z71.3 DIETARY COUNSELING AND SURVEILLANCE: Primary | ICD-10-CM

## 2024-02-21 DIAGNOSIS — E79.0 HYPERURICEMIA: ICD-10-CM

## 2024-02-21 DIAGNOSIS — I50.42 CHRONIC COMBINED SYSTOLIC AND DIASTOLIC CHF (CONGESTIVE HEART FAILURE) (MULTI): ICD-10-CM

## 2024-02-21 PROCEDURE — 97802 MEDICAL NUTRITION INDIV IN: CPT

## 2024-02-21 NOTE — PROGRESS NOTES
"NUTRITION ASSESSMENT NOTE    Reason for Nutrition Visit:  Pt is a 73 y.o. male being seen in person for an initial appointment at [facility] referred for CHF, poorly controlled DMT2, HLD, and hyperuricemia.      Pt, who is accompanied by wife Marlyn, states they seek  from dietitian for help learning how to eat to manage all of his various medical and health conditions.     Anthropometrics:  Wt: 270#, 123kg  Ht: 6'3\"  BMI: 33.80 kg/m2      Past Medical Hx:  Patient Active Problem List   Diagnosis    Unknown cause of injury    Obstructive sleep apnea syndrome in adult    PAF (paroxysmal atrial fibrillation) (CMS/HCC)    Personal history of colonic polyps    Poorly controlled type 2 diabetes mellitus (CMS/HCC)    Type 2 diabetes mellitus (CMS/HCC)    Presence of coronary angioplasty implant and graft    Primary osteoarthritis of left hand    Renal mass    Restless legs syndrome    Trigger middle finger of left hand    Swelling of left hand    Seasonal allergies    Obesity    Low back pain    Joint swelling    Fatty liver    History of cardiomyopathy    Hypercalcemia    Hyperlipidemia    Anemia    ASHD (arteriosclerotic heart disease)    Hypertrophy of inter-atrial septum    Atopic dermatitis    Benign essential hypertension    BPH (benign prostatic hyperplasia)    Cardiac disease    Cellulitis of hand, left    Chronic combined systolic and diastolic CHF (congestive heart failure) (CMS/HCC)    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)    Chronic GERD    Disorder of iron metabolism    ACE-inhibitor cough    Dysuria    Nodule in muscle    Routine general medical examination at health care facility    Hyperuricemia    Gouty arthritis of left hand    Diverticulosis of large intestine    Gouty arthritis of toe    Hemorrhoids    Polyp of colon          Lab Results   Component Value Date    HGBA1C 7.7 (H) 12/18/2023    CHOL 142 12/18/2023    LDLF 52 06/12/2023    TRIG 212 (H) 12/18/2023    "       Food and Nutrition Hx:  Pt has been to diabetic classes before, but years have transpired since taking them and not much is remembered as to the material covered. Pt then states that he knows he does not eat well, to include not eating enough vegetables. Blood sugars not under control, AFIB a new dx, and gout also not under control. Pt plans on conducting swimming for exercise and physical activity when he gets the approval from physician to do so.     Pt lives in senior housing, but lives independently and he and his wife have a kitchen. Pt is reported as a picky eater, limited variety in fruits and the only vegetables preferred are fried.     Pt had MI in 2003, went on ClassifEye diet and lost ~60#. However, pt returned to his normal eating patterning and gained all the wt back with an additional amount.    Pt reports that he cannot eat a lot at a given time since being on the medication Bydureon, but develops hunger shortly after finishing meals.     RDN notes that pt is, as with most Americans, is consuming a nearly 100% processed food diet pattern with excessive volumes of refined carbohydrates, saturated fat, and sodium, and not consuming any where near enough plant-based food items or lean protein sources to include seafood. Pt and wife were counseled on the Mediterranean diet pattern, as well as leaning into a more prolific plant consumption with vegetables and fruits as prime focus. Water intake was reviewed, as well as various medical nutrition therapy recommendations for gout, diabetes, heart health, and weight loss.       DIETARY RECALL: *Pt consumes an average of 2-3 meals/day*  Wake-up: Varied timing ~4am-8am  Meal 1: Most days, ~9am/10am, Eggs x 1 w/ orange/banana/grapes and piece of cheese (cheddar, Swiss), oatmeal w/ fruit/nuts/dried fruit, sausage gravy and biscuits x once/month  Meal 2: Some days, ~1pm/2pm, Knickerbocker (PB and jelly, bologna, tuna, egg salad), wraps, macaroni and cheese,  flavored rice  Meal 3: Everyday, ~5pm-7pm, Ira (PB and jelly, bologna, tuna, egg salad), vegetable soup (homemade), [employer], meatloaf, chicken, shrimp, pork chops, salads (new addition), cottage cheese, hot dogs  Snacks: Ira (PB and jelly, bologna, tuna, egg salad), sugar free chocolates, crackers w/ cheese, potato chips (occasional), tortilla chips, cottage cheese and canned fruit, dried fruit  Bedtime: ~8pm/9pm  Beverages: Coffee (sugar-free creamer and Stevia), water x 1 qt-1/2 gallon, diet soda pop x 1/2 daily, water w/ pink lemonade mix      NUTRITIONAL ARTIFACTS:  Used to consume 6 or more hot dogs in a sitting    Allergies: None  Intolerance: Shukla  Appetite: Good  Intake: >75% *Potentially 50-75%  GI Symptoms : None Frequency: absent  Swallowing Difficulty: No problems with swallowing  Dentition : own and edentulous    Supplements: Vitamin B12, Vitamin C, Zinc, Tumeric, and Condroitin, Saw Palmetto  daily    Energy Levels: Low    Food Preparation: Partner/Spouse  Cooking Skills/Barriers: Disability/Limited Mobility       Nutrition Focused Physical Exam:    Performed/Deferred: Deferred as pt visually appears well-nourished with no signs of malnutrition      Estimated Energy Needs:    WEIGHT MAINTENANCE: MSJ, 2072 x 1.2 (AF) = 2500 kcal/day  WEIGHT LOSS: 22-25 kcal/kg IBW = 5940-2526 kcal/day  PROTEIN Needs: 0.8-1 g/kg = 100-125 g/day    Nutrition Diagnosis:    Diagnosis Statement 1:  Diagnosis Status: New  Diagnosis : Altered nutrition related lab values  related to  endocrine and/or metabolic dysfunction  as evidenced by  patient HbA1C of 7.7%, VLDL of 42 mg/dL, and TG of 212 mg/dL    Diagnosis Statement 2:  Diagnosis Status: New  Diagnosis : Inadequate fiber intake  related to food and nutrition related knowledge deficit concerning desirable quantities of fiber as evidenced by estimated intake of fiber that is insufficient when compared to recommended amounts (38 g/day for men and 25 g/day for  women)    Diagnosis Statement 3:   Diagnosis Status: New  Diagnosis : Intake of types of carbohydrate inconsistent with needs  related to  lack of or limited nutrition education as it relates to appropriate and healthy carbohydrate sources  as evidenced by  dietary recall indicating regular and daily intake of refined, processed, high glycemic carbohydrates     Nutrition Interventions:  Anti-Inflammatory Diet, Consistent Carbohydrate Diet, DASH Diet, Decreased Carbohydrate Diet, Decreased Fat Diet, Decreased Sodium Diet, Increased Fiber Diet, Increased Soluble Fiber, Increased Omega-3 Diet, Increased Protein Diet, Low Saturated Fat Diet, Mediterranean Diet, and Plant Based Diet  Nutrition Counseling: Motivational Interviewing and Health Belief Model  Coordination of Care: None        Educational Handouts: Purine Food List, CHF MNT, ACLM NB's, DGDD, 30DMDMP, PBP, ADA My Plate, Protein Content of Foods List    Readiness to Change : Fair  Level of Understanding : Good  Anticipated Compliant : Fair

## 2024-03-04 ENCOUNTER — EVALUATION (OUTPATIENT)
Dept: PHYSICAL THERAPY | Facility: HOSPITAL | Age: 74
End: 2024-03-04
Payer: MEDICARE

## 2024-03-04 DIAGNOSIS — R26.89 FUNCTIONAL GAIT ABNORMALITY: Chronic | ICD-10-CM

## 2024-03-04 DIAGNOSIS — R26.89 POOR BALANCE: Chronic | ICD-10-CM

## 2024-03-04 DIAGNOSIS — M54.50 LOW BACK PAIN, UNSPECIFIED BACK PAIN LATERALITY, UNSPECIFIED CHRONICITY, UNSPECIFIED WHETHER SCIATICA PRESENT: Primary | ICD-10-CM

## 2024-03-04 PROCEDURE — 97110 THERAPEUTIC EXERCISES: CPT | Mod: GP | Performed by: PHYSICAL THERAPIST

## 2024-03-04 PROCEDURE — 97162 PT EVAL MOD COMPLEX 30 MIN: CPT | Mod: GP | Performed by: PHYSICAL THERAPIST

## 2024-03-04 ASSESSMENT — PAIN - FUNCTIONAL ASSESSMENT: PAIN_FUNCTIONAL_ASSESSMENT: 0-10

## 2024-03-04 ASSESSMENT — ENCOUNTER SYMPTOMS
DEPRESSION: 0
OCCASIONAL FEELINGS OF UNSTEADINESS: 1
LOSS OF SENSATION IN FEET: 1

## 2024-03-04 ASSESSMENT — PAIN SCALES - GENERAL: PAINLEVEL_OUTOF10: 4

## 2024-03-04 NOTE — PROGRESS NOTES
Physical Therapy    Physical Therapy Lumbar Spine Evaluation    Patient Name: Isauro Valdez  MRN: [id number]  Today's Date: 3/4/2024      Visit Number: 1  Auth Dates: TBD    Current Problem  Problem List Items Addressed This Visit             ICD-10-CM       Musculoskeletal and Injuries    Low back pain - Primary M54.50    Relevant Orders    Follow Up In Physical Therapy       Symptoms and Signs    Functional gait abnormality (Chronic) R26.89    Poor balance (Chronic) R26.89          General  Name and  confirmed with patient on this date.   Pt referred for back pain according to Rx, however reports his biggest problem is balance with standing and walking.   Precautions   7 on STEADI (4 or higher indicates increased fall risk)   Type 2 DM, HTN, A-fib, history of scoliosis    Pain  Pain Assessment: 0-10  Pain Score: 4  Pain Location: Back    SUBJECTIVE:   Chief complaint:  difficulty walking and loss of balance    Pain Better: rest, position changes  Pain Worse: sitting long periods, stair negotiation, walking long distances (more than 1-2 blocks)  Imaging: none in recent years  Prior level of function: walking without device  Current limitations: stairs, walking, transfers, sitting  Home setup: 1 story home, no ONEIL. Daughter lives on second floor apartment and has difficulty with the stairs. Non-reciprocal with cane and HR.  Work: Retired  Patients goal: learn what exercises to do at the gym and determine if rolator would be helpful  Prior tx: none    OBJECTIVE:    Posture: mild forward flexed posture    Spine ROM: WNL Unless documented below:  ROM (In degrees)   Flexion 45   Extension 0    Right Left   Side Bend 8 12   Rotation         Lower Extremity Strength: (5/5 unless noted)   RIGHT LEFT   Hip Flexion 4- 4-   Hip Abduction 4- 4-   Knee Extension 4 4   Knee Flexion 4 4   Ankle DF 4+ 4+       Neurological symptoms neuropathy in both feet (diabetes related)    Balance:  Romberg: Eyes open 30 sec mild  difficulty/hip sway  Eyes closed 15 sec with LOB  Tandem: unable to achieve position without UE support    TUG: 15 seconds without device    Gait: decreased misha and step length, lack hip/knee flexion during swing and hip extension during push-off.    Stairs: 4 standard stairs, non-reciprocal with 2 Hrs. Increased time.         TREATMENT:     EXERCISES Date 3/4/2024   Date Date Date    REPS REPS REPS REPS          Nustep (instruct recumbent bike for gym)                     Shuttle  DLP       Shuttle SLP       Shuttle TR/HR              Qhip Flexion       Qhip Extension       Qhip Abduction                     Q Quad       Q Hamstring               HS stretch       RB calf stretch                     T-Bands       Pulldowns       Rows       Lat pulldowns                     Instruct pt in safe exercise at his gym               HEP 15 min           ASSESSEMENT  Pt is a 73 y.o.  referred for physical therapy by Javier Hong *  for low back pain. Pt presents with gait abnormalities, balance deficits, decreased strength and ROM and presence of pain. This patient would benefit from a therapy program to restore prior level of function, decrease pain, increase AROM, increase strength and improve posture.    The physical therapy prognosis is good for the patient to achieve their goals.   The pt tolerated therapy treatment today with no adverse effects.  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     The pt has been educated about the risks and benefits of physical therapy and gives consent for treatment.     The patient will benefit from physical therapy treatment to include: Treatment/Interventions: Therapeutic exercises    Goals:  Active       PT Problem       PT Goal 1       Start:  03/04/24    Expected End:  04/01/24       Pt will be able  to teach back 90% of HEP.         PT Goal 2       Start:  03/04/24    Expected End:  04/01/24       Pt will demonstrate safety and understanding of use of gym equipment in order to do an independent exercise program at his gym.

## 2024-03-07 DIAGNOSIS — E79.0 HYPERURICEMIA: ICD-10-CM

## 2024-03-07 DIAGNOSIS — M10.9 GOUTY ARTHRITIS OF LEFT HAND: ICD-10-CM

## 2024-03-07 RX ORDER — COLCHICINE 0.6 MG/1
0.6 TABLET ORAL AS NEEDED
Qty: 60 TABLET | Refills: 0 | Status: SHIPPED | OUTPATIENT
Start: 2024-03-07

## 2024-03-16 NOTE — PROGRESS NOTES
[facility]    Patient Name: Isauro Valdez  Patient : 1950      Scribe Attestation  By signing my name below, IMagdalene Scribe   attest that this documentation has been prepared under the direction and in the presence of Denzel Nunez DO.      Reason for visit:  This is a 73-year-old male here for follow-up regarding his history of coronary artery disease status post stenting of his RCA done in , history of cardiomyopathy with ejection fraction naina of 35% now improved to 60%, hypertension, dyslipidemia, diabetes mellitusobstructive sleep apnea, and obesity.    HPI:  This is a 73-year-old male here for follow-up regarding his history of coronary artery disease status post stenting of his RCA done in , history of cardiomyopathy with ejection fraction naina of 35% now improved to 60%, hypertension, dyslipidemia, diabetes mellitus, obstructive sleep apnea, and obesity.  The patient was last evaluated by me in 2023.  At that visit I discussed this perioperative cardiovascular risk associated with undergoing a colonoscopy and otherwise asked that he follow-up in several months.  CMP done in 2023 showed normal serum sodium and potassium with a serum creatinine of 1.23, mildly elevated ALT/AST, hemoglobin A1c of 7.7%, TSH 2.58, CBC showing a hemoglobin of 14.5.  Lipid panel done in 2023 showed an LDL cholesterol of 63 and triglycerides of 212 while on rosuvastatin 20 mg daily. ECG done today showed sinus rhythm with a heart rate of 87 bpm.  The patient reports that he was noted to be in atrial fibrillation during a PCP visit and was started on apixaban for thromboembolism prophylaxis. The patient reports that he has been feeling generally well from the cardiac standpoint. He denies any new chest pain, shortness of breath, palpitations and lightheadedness. He states that he takes all of his medications as prescribed. During my  exam, he was resting comfortably on the exam table.            Assessment/Plan:   1. Coronary artery disease  The patient has a history of coronary artery disease status post stenting of his RCA done in 2003.  ECG done today showed sinus rhythm with a heart rate of 87 bpm.   He denies anginal chest discomfort.  Blood pressure appears controlled on exam today.  He should continue his current antihypertensive medications and antiplatelet therapy.  Lipid panel done in December 2023 showed an LDL cholesterol of 63 and triglycerides of 212 while on rosuvastatin 20 mg daily.   Recent lab works as noted in the HPI.   Lab works as noted below will be done in 6 months prior to his next visit.  Please also see lifestyle recommendations below.  Follow up in 6 months and sooner if necessary.      2. History of cardiomyopathy  The patient has a history of a cardiomyopathy with an ejection fraction naina of 35% now improved to 60%.  Echocardiogram done in August 2021 showed normal left ventricular systolic function with an ejection fraction of 60%, low normal right ventricular systolic function, no significant valve abnormalities, and a dilated ascending aorta measured at 4.3 cm.  He does not appear significantly volume overloaded on exam today except for trace pitting edema.  He should continue his current cardiac medications.  Recent lab works as noted in the HPI.   Lab works as noted below will be done in 6 months prior to his next visit.   I discussed with him the importance of following a low-sodium heart healthy diet as well as weight loss.   Follow up in 6 months and sooner if necessary.     3.  Paroxysmal atrial fibrillation  The patient was diagnosed with atrial fibrillation in February 2024.   He is currently on apixaban for thromboembolism prophylaxis which should be continued.  He should continue carvedilol for heart rate control.  ECG done today showed sinus rhythm with a heart rate of 87 bpm.   He denies chest pain,  palpitations or lightheadedness.   Recent lab works as noted in the HPI.  Lab works as noted below will be done in 6 months prior to his next visit.   Follow up in 6 months and sooner if necessary.     4. Anticoagulation with apixaban  The patient is on anticoagulation with apixaban for atrial fibrillation.  Recent lab works as noted in the HPI.  Lab works as noted below will be done in 6 months prior to his next visit.     5. Hypertension  Patient has a history of hypertension which appears controlled on exam today.  He should continue his current antihypertensive medications.      6. Dyslipidemia  Lipid panel done in December 2023 showed an LDL cholesterol of 63 and triglycerides of 212 while on rosuvastatin 20 mg daily.   Please see lifestyle recommendations below.      7. Diabetes mellitus  Hemoglobin A1c done in December 2023 was 7.7%.  Medication management per primary care provider.     8. Obstructive sleep apnea  Patient is reportedly noncompliant with CPAP therapy.   Management as per PCP.     9. Obesity  Please see lifestyle recommendations below.      Order:   BMP/CBC/Mg in 6 months  Follow up in 6 months    Lifestyle Recommendations  I recommend a whole-food plant-based diet, an eating pattern that encourages the consumption of unrefined plant foods (such as fruits, vegetables, tubers, whole grains, legumes, nuts and seeds) and discourages meats, dairy products, eggs and processed foods.     The AHA/ACC recommends that the patient consume a dietary pattern that emphasizes intake of vegetables, fruits, and whole grains; includes low-fat dairy products, poultry, fish, legumes, non-tropical vegetable oils, and nuts; and limits intake of sodium, sweets, sugar-sweetened beverages, and red meats.  Adapt this dietary pattern to appropriate calorie requirements (a 500-750 kcal/day deficit to loose weight), personal and cultural food preferences, and nutrition therapy for other medical conditions (including  diabetes).  Achieve this pattern by following plans such as the [place] Mediterranean, DASH dietary pattern, or AHA diet.     Engage in 2 hours and 30 minutes per week of moderate-intensity physical activity, or 1 hour and 15 minutes (75 minutes) per week of vigorous-intensity aerobic physical activity, or an equivalent combination of moderate and vigorous-intensity aerobic physical activity. Aerobic activity should be performed in episodes of at least 10 minutes preferably spread throughout the week.     Adhering to a heart healthy diet, regular exercise habits, avoidance of tobacco products, and maintenance of a healthy weight are crucial components of their heart disease risk reduction.     Any positive review of systems not specifically addressed in the office visit today should be evaluated and treated by the patients primary care physician or in an emergency department if necessary     Patient was notified that results from ordered tests will be called to the patient if it changes current management; it will otherwise be discussed at a future appointment and available on  [facility].     Thank you for allowing me to participate in the care of this patient.        This document was generated using the assistance of voice recognition software. If there are any errors of spelling, grammar, syntax, or meaning; please feel free to contact me directly for clarification.    Past Medical History:  He has a past medical history of Cellulitis of left lower limb (09/06/2019), Corns and callosities (09/06/2019), Coronary angioplasty status (09/06/2019), Old myocardial infarction (09/06/2019), Other forms of dyspnea (02/19/2020), Personal history of diabetic foot ulcer (09/06/2019), Personal history of other diseases of the circulatory system (10/07/2019), Personal history of other drug therapy (09/06/2019), Personal history of other drug therapy (09/06/2019), Personal history of other drug therapy (09/06/2019), Personal  history of other endocrine, nutritional and metabolic disease, Personal history of other specified conditions (09/06/2019), Personal history of urinary calculi, Personal history of urinary calculi (09/06/2019), Rash and other nonspecific skin eruption (12/27/2018), and Type 2 diabetes mellitus with diabetic nephropathy (CMS/HCC) (10/07/2019).    Past Surgical History:  He has a past surgical history that includes Coronary angioplasty (05/17/2017); Ankle surgery (05/17/2017); Other surgical history (05/17/2017); Lumbar laminectomy (05/17/2017); Lithotripsy (05/17/2017); Knee arthroscopy w/ debridement (05/17/2017); Other surgical history (09/01/2022); and Foot surgery (06/12/2018).      Social History:  He reports that he has never smoked. He has quit using smokeless tobacco. He reports current alcohol use. He reports that he does not use drugs.    Family History:  Family History   Problem Relation Name Age of Onset    Heart attack Other          Allergies:  Adhesive tape-silicones, Niacin, Nystatin, and Penicillins    Outpatient Medications:  Current Outpatient Medications   Medication Instructions    allopurinol (ZYLOPRIM) 200 mg, oral, Daily    amLODIPine (Norvasc) 2.5 mg tablet TAKE 1 TABLET BY MOUTH EVERYDAY AT BEDTIME    apixaban (ELIQUIS) 5 mg, oral, 2 times daily    ascorbic acid (Vitamin C) 500 mg tablet oral    aspirin 81 mg EC tablet oral    carvedilol (Coreg) 25 mg tablet TAKE 1 TABLET BY MOUTH TWICE A DAY WITH MORNING AND EVENING MEAL    cloNIDine (Catapres) 0.1 mg tablet 1 tablet, oral, 2 times daily    colchicine 0.6 mg, oral, As needed    colestipol (Colestid) 1 gram tablet TAKE 2 TABLETS BY MOUTH TWICE A DAY    empagliflozin (JARDIANCE) 25 mg, oral, Daily    exenatide microspheres (Bydureon BCise) 2 mg/0.85 mL auto-injector INJECT 2 MG SUBCUTANEOUSLY ONCE WEEKLY    famotidine (Pepcid) 20 mg tablet TAKE 1 TABLET BY MOUTH EVERY DAY AS DIRECTED    ferrous sulfate 325 (65 Fe) MG tablet Take 1 tablet by  "mouth early in the morning..    fish oil concentrate (Omega-3) 120-180 mg capsule 1 capsule, oral, Daily    furosemide (LASIX) 40 mg, oral, Daily    gabapentin (NEURONTIN) 600 mg, oral, Nightly    glimepiride (AMARYL) 1 mg, oral, 2 times daily before meals    glucosamine-chondroit-vit C-Mn 500-400 mg capsule oral    hydroCHLOROthiazide (HYDRODiuril) 25 mg tablet TAKE 1 TABLET BY MOUTH EVERY DAY    lansoprazole (Prevacid) 15 mg DR capsule 1 capsule, oral, Daily    losartan (COZAAR) 100 mg, oral, Daily    metFORMIN (Glucophage) 1,000 mg tablet TAKE 1 TABLET EVERY 12 HOURS DAILY    multivitamin with folic acid (One Daily Multivitamin) 400 mcg tablet 1 tablet, oral, Daily    OneTouch Ultra Test strip USE AS DIRECTED 3 TIMES A DAY    pioglitazone (ACTOS) 45 mg, oral, Daily    rosuvastatin (CRESTOR) 20 mg, oral, Daily    saw palmetto 450 mg capsule 1 capsule, oral, Daily    triamcinolone (Kenalog) 0.1 % cream Topical, 2 times daily    turmeric-turmeric root extract 450-50 mg capsule 1 capsule, oral, Daily    zinc gluconate 50 mg tablet 1 tablet, oral, Daily        ROS:  A 14 point review of systems was done and is negative other than as stated in HPI    Vitals:      6/19/2023    10:54 AM 6/27/2023    10:41 AM 9/11/2023     9:18 AM 12/20/2023     8:27 AM 1/9/2024    10:06 AM 2/14/2024    10:49 AM 2/15/2024     9:04 AM   Vitals   Systolic 129 109  102 130 125 120   Diastolic 76 71  64 82 73 74   Heart Rate 65 85  88 90 99 84   Temp 35.9 °C (96.6 °F)    36.3 °C (97.3 °F) 36.1 °C (97 °F)    Resp     16 16    Height (in) 1.905 m (6' 3\")  1.905 m (6' 3\") 1.905 m (6' 3\") 1.905 m (6' 3\") 1.905 m (6' 3\")    Weight (lb) 245 269 270.06 271 272.9 270.4    BMI 30.62 kg/m2 33.62 kg/m2 33.76 kg/m2 33.87 kg/m2 34.11 kg/m2 33.8 kg/m2    BSA (m2) 2.42 m2 2.54 m2 2.55 m2 2.55 m2 2.56 m2 2.55 m2         Physical Exam:   Constitutional: Cooperative, in no acute distress, alert, appears stated age.  Skin: Skin color, texture, turgor normal. No " rashes or lesions.  Head: Normocephalic. No masses, lesions, tenderness or abnormalities  Eyes: Extraocular movements are grossly intact.  Mouth and throat: Mucous membranes moist  Neck: Neck supple, no carotid bruits, no JVD  Respiratory: Lungs clear to auscultation, no wheezing or rhonchi, no use of accessory muscles  Chest wall: No scars, normal excursion with respiration  Cardiovascular: Regular rhythm without murmur  Gastrointestinal: Abdomen soft, nontender. Bowel sounds normal.  Musculoskeletal: Strength equal in upper extremities  Extremities: Trace pitting edema  Neurologic: Sensation grossly intact, alert and oriented ×3    Intake/Output:   No intake/output data recorded.    Outpatient Medications  Current Outpatient Medications on File Prior to Visit   Medication Sig Dispense Refill    allopurinol (Zyloprim) 100 mg tablet Take 2 tablets (200 mg) by mouth once daily. 180 tablet 1    amLODIPine (Norvasc) 2.5 mg tablet TAKE 1 TABLET BY MOUTH EVERYDAY AT BEDTIME 90 tablet 1    apixaban (Eliquis) 5 mg tablet Take 1 tablet (5 mg) by mouth 2 times a day. 60 tablet 4    ascorbic acid (Vitamin C) 500 mg tablet Take by mouth.      aspirin 81 mg EC tablet Take by mouth.      carvedilol (Coreg) 25 mg tablet TAKE 1 TABLET BY MOUTH TWICE A DAY WITH MORNING AND EVENING MEAL 180 tablet 1    cloNIDine (Catapres) 0.1 mg tablet Take 1 tablet (0.1 mg) by mouth 2 times a day.      colchicine 0.6 mg tablet Take 1 tablet (0.6 mg) by mouth if needed for muscle/joint pain (gout). 60 tablet 0    colestipol (Colestid) 1 gram tablet TAKE 2 TABLETS BY MOUTH TWICE A  tablet 0    empagliflozin (Jardiance) 25 mg Take 1 tablet (25 mg) by mouth once daily. 90 tablet 1    exenatide microspheres (Bydureon BCise) 2 mg/0.85 mL auto-injector INJECT 2 MG SUBCUTANEOUSLY ONCE WEEKLY 12 each 3    famotidine (Pepcid) 20 mg tablet TAKE 1 TABLET BY MOUTH EVERY DAY AS DIRECTED 90 tablet 0    ferrous sulfate 325 (65 Fe) MG tablet Take 1 tablet  by mouth early in the morning..      fish oil concentrate (Omega-3) 120-180 mg capsule Take 1 capsule (1 g) by mouth once daily.      furosemide (Lasix) 40 mg tablet TAKE 1 TABLET BY MOUTH EVERY DAY 90 tablet 3    gabapentin (Neurontin) 300 mg capsule TAKE 2 CAPSULES BY MOUTH EVERY DAY AT BEDTIME 180 capsule 3    glimepiride (Amaryl) 1 mg tablet Take 1 tablet (1 mg) by mouth 2 times a day before meals. 180 tablet 1    glucosamine-chondroit-vit C-Mn 500-400 mg capsule Take by mouth.      hydroCHLOROthiazide (HYDRODiuril) 25 mg tablet TAKE 1 TABLET BY MOUTH EVERY DAY 90 tablet 1    lansoprazole (Prevacid) 15 mg DR capsule Take 1 capsule (15 mg) by mouth once daily.      losartan (Cozaar) 100 mg tablet TAKE 1 TABLET BY MOUTH EVERY DAY 90 tablet 3    metFORMIN (Glucophage) 1,000 mg tablet TAKE 1 TABLET EVERY 12 HOURS DAILY 180 tablet 1    multivitamin with folic acid (One Daily Multivitamin) 400 mcg tablet Take 1 tablet by mouth once daily.      OneTouch Ultra Test strip USE AS DIRECTED 3 TIMES A DAY      pioglitazone (Actos) 45 mg tablet TAKE 1 TABLET (45 MG) BY MOUTH ONCE DAILY 90 tablet 1    rosuvastatin (Crestor) 20 mg tablet TAKE 1 TABLET BY MOUTH EVERY DAY 90 tablet 3    saw palmetto 450 mg capsule Take 1 capsule (450 mg) by mouth once daily.      triamcinolone (Kenalog) 0.1 % cream Apply topically 2 times a day.      turmeric-turmeric root extract 450-50 mg capsule Take 1 capsule by mouth once daily.      zinc gluconate 50 mg tablet Take 1 tablet (50 mg) by mouth once daily.       No current facility-administered medications on file prior to visit.       Labs: (past 26 weeks)  Recent Results (from the past 4368 hour(s))   Hemoglobin A1C    Collection Time: 12/18/23  7:15 AM   Result Value Ref Range    Hemoglobin A1C 7.7 (H) see below %    Estimated Average Glucose 174 Not Established mg/dL   TSH with reflex to Free T4 if abnormal    Collection Time: 12/18/23  7:15 AM   Result Value Ref Range    Thyroid  Stimulating Hormone 2.58 0.44 - 3.98 mIU/L   Albumin , Urine Random    Collection Time: 12/18/23  7:15 AM   Result Value Ref Range    Albumin, Urine Random 11.7 Not established mg/L    Creatinine, Urine Random 68.1 20.0 - 370.0 mg/dL    Albumin/Creatine Ratio 17.2 <30.0 ug/mg Creat   Vitamin D 1,25 Dihydroxy    Collection Time: 12/18/23  7:15 AM   Result Value Ref Range    Vit D, 1,25-Dihydroxy 27.1 19.9 - 79.3 pg/mL   Comprehensive Metabolic Panel    Collection Time: 12/18/23  7:15 AM   Result Value Ref Range    Glucose 180 (H) 74 - 99 mg/dL    Sodium 141 136 - 145 mmol/L    Potassium 4.1 3.5 - 5.3 mmol/L    Chloride 101 98 - 107 mmol/L    Bicarbonate 29 21 - 32 mmol/L    Anion Gap 15 10 - 20 mmol/L    Urea Nitrogen 25 (H) 6 - 23 mg/dL    Creatinine 1.23 0.50 - 1.30 mg/dL    eGFR 63 >60 mL/min/1.73m*2    Calcium 10.7 (H) 8.6 - 10.3 mg/dL    Albumin 4.6 3.4 - 5.0 g/dL    Alkaline Phosphatase 51 33 - 136 U/L    Total Protein 7.1 6.4 - 8.2 g/dL    AST 44 (H) 9 - 39 U/L    Bilirubin, Total 0.5 0.0 - 1.2 mg/dL    ALT 60 (H) 10 - 52 U/L   Lipid panel    Collection Time: 12/18/23  7:15 AM   Result Value Ref Range    Cholesterol 142 0 - 199 mg/dL    HDL-Cholesterol 36.8 mg/dL    Cholesterol/HDL Ratio 3.9     LDL Calculated 63 <=99 mg/dL    VLDL 42 (H) 0 - 40 mg/dL    Triglycerides 212 (H) 0 - 149 mg/dL    Non HDL Cholesterol 105 0 - 149 mg/dL   Uric acid    Collection Time: 12/18/23  7:15 AM   Result Value Ref Range    Uric Acid 7.8 (H) 4.0 - 7.5 mg/dL   Hepatitis C antibody    Collection Time: 12/18/23  7:15 AM   Result Value Ref Range    Hepatitis C AB Nonreactive Nonreactive   CBC and Auto Differential    Collection Time: 12/18/23  7:15 AM   Result Value Ref Range    WBC 5.5 4.4 - 11.3 x10*3/uL    nRBC 0.0 0.0 - 0.0 /100 WBCs    RBC 4.80 4.50 - 5.90 x10*6/uL    Hemoglobin 14.5 13.5 - 17.5 g/dL    Hematocrit 45.1 41.0 - 52.0 %    MCV 94 80 - 100 fL    MCH 30.2 26.0 - 34.0 pg    MCHC 32.2 32.0 - 36.0 g/dL    RDW 14.3 11.5  - 14.5 %    Platelets 200 150 - 450 x10*3/uL    Neutrophils % 56.2 40.0 - 80.0 %    Immature Granulocytes %, Automated 0.4 0.0 - 0.9 %    Lymphocytes % 27.5 13.0 - 44.0 %    Monocytes % 10.6 2.0 - 10.0 %    Eosinophils % 4.6 0.0 - 6.0 %    Basophils % 0.7 0.0 - 2.0 %    Neutrophils Absolute 3.07 1.60 - 5.50 x10*3/uL    Immature Granulocytes Absolute, Automated 0.02 0.00 - 0.50 x10*3/uL    Lymphocytes Absolute 1.50 0.80 - 3.00 x10*3/uL    Monocytes Absolute 0.58 0.05 - 0.80 x10*3/uL    Eosinophils Absolute 0.25 0.00 - 0.40 x10*3/uL    Basophils Absolute 0.04 0.00 - 0.10 x10*3/uL   Calcium, Ionized    Collection Time: 12/18/23  8:56 AM   Result Value Ref Range    POCT Calcium, Ionized 1.35 (H) 1.1 - 1.33 mmol/L   PTH, intact    Collection Time: 01/10/24 10:40 AM   Result Value Ref Range    Parathyroid Hormone, Intact 64.5 18.5 - 88.0 pg/mL   Sedimentation Rate    Collection Time: 01/10/24 10:40 AM   Result Value Ref Range    Sedimentation Rate 30 (H) 0 - 20 mm/h   C-reactive protein    Collection Time: 01/10/24 10:40 AM   Result Value Ref Range    C-Reactive Protein 0.14 <1.00 mg/dL   Rheumatoid factor    Collection Time: 01/10/24 10:40 AM   Result Value Ref Range    Rheumatoid Factor 51 (H) 0 - 15 IU/mL   POCT UA Automated manually resulted    Collection Time: 02/15/24  8:57 AM   Result Value Ref Range    POC Glucose, Urine >=1000 (4+) (A) NEGATIVE mg/dl    POC Bilirubin, Urine NEGATIVE NEGATIVE    POC Ketones, Urine NEGATIVE NEGATIVE mg/dl    POC Specific Gravity, Urine 1.015 1.005 - 1.035    POC Blood, Urine NEGATIVE NEGATIVE    POC PH, Urine 5.5 No Reference Range Established PH    POC Protein, Urine NEGATIVE NEGATIVE, 30 (1+) mg/dl    POC Urobilinogen, Urine 0.2 0.2, 1.0 EU/DL    Poc Nitrite, Urine NEGATIVE NEGATIVE    POC Leukocytes, Urine NEGATIVE NEGATIVE   PSA    Collection Time: 02/15/24  9:34 AM   Result Value Ref Range    Prostate Specific AG 2.36 <=4.00 ng/mL       ECG  Encounter Date: 02/14/24   ECG 12  lead (Clinic Performed)    Narrative    Atrial fibrillation, HR -91       Echocardiogram  No results found for this or any previous visit from the past 1095 days.      CV Studies:  EKG:  Encounter Date: 02/14/24   ECG 12 lead (Clinic Performed)    Narrative    Atrial fibrillation, HR -91     Echocardiogram:   Echocardiogram     Narrative  [place] [id number]  Phone [phone] Fax [phone]    TRANSTHORACIC ECHOCARDIOGRAM REPORT      Patient Name:     MAHAMED Batres Physician:   [id number] Sol FELIX MD  Study Date:       8/23/2021      Referring Physician: [id number] DERRICK TAY  MRN/PID:          [id number]       PCP:  Accession/Order#: [id number]   Department Location: [facility]  YOB: 1950       Fellow:  Gender:           M              Nurse:               Robert Lemos RN  Admit Date:                      Sonographer:         Rossana Kelly [facility]  Admission Status: Outpatient     Additional Staff:  Height:           190.50 cm      CC Report to:  Weight:           119.30 kg      Study Type:          Echocardiogram  BSA:              2.47 m2  Blood Pressure: 105 /68 mmHg    Diagnosis/ICD: I25.10-Atherosclerotic heart disease of native coronary artery  without angina pectoris  Indication:    Dyspnea on Exertion  Procedure/CPT: Echo Complete w Full [id number]    Patient History:  Pertinent History: Stent, HTN, CM, ASHD, DM.    Study Detail: The following Echo studies were performed: 2D, M-Mode, Doppler and  color flow. Technically challenging study due to body habitus.  Optison used as a contrast agent for endocardial border  definition. Total contrast used for this procedure was 5 mL via IV  push.      PHYSICIAN INTERPRETATION:  Left Ventricle: The left ventricular systolic function is normal, with an estimated ejection fraction of 60-65%. There are no regional wall motion abnormalities.  The left ventricular cavity size is normal. Spectral Doppler shows a normal pattern of left ventricular diastolic filling.  Left Atrium: The left atrium is normal in size.  Right Ventricle: The right ventricle is normal in size. There is low normal right ventricular systolic function.  Right Atrium: The right atrium is normal in size.  Aortic Valve: The aortic valve is probably trileaflet. There is no evidence of aortic valve regurgitation. The peak instantaneous gradient of the aortic valve is 6.4 mmHg. The mean gradient of the aortic valve is 3.0 mmHg.  Mitral Valve: The mitral valve is normal in structure. There is trace mitral valve regurgitation.  Tricuspid Valve: The tricuspid valve is structurally normal. No evidence of tricuspid regurgitation.  Pulmonic Valve: The pulmonic valve is structurally normal. There is physiologic pulmonic valve regurgitation.  Pericardium: There is no pericardial effusion noted.  Aorta: The aortic root is abnormal. There is moderate dilatation of the ascending aorta. Based of AD/height < 2.43 cm/m indicated lower risk of dissection.      CONCLUSIONS:  1. The left ventricular systolic function is normal with a 60-65% estimated ejection fraction.  2. There is moderate dilatation of the ascending aorta.    QUANTITATIVE DATA SUMMARY:  2D MEASUREMENTS:  Normal Ranges:  Ao Root d:     3.90 cm   (2.0-3.7cm)  LAs:           3.20 cm   (2.7-4.0cm)  IVSd:          1.10 cm   (0.6-1.1cm)  LVPWd:         1.10 cm   (0.6-1.1cm)  LVIDd:         4.70 cm   (3.9-5.9cm)  LVIDs:         3.40 cm  LV Mass Index: 76.1 g/m2  LV % FS        27.7 %    LA VOLUME:  Normal Ranges:  LA Vol A4C:        34.6 ml    (22+/-6mL/m2)  LA Vol A2C:        46.7 ml  LA Vol BP:         42.8 ml  LA Vol Index A4C:  14.0ml/m2  LA Vol Index A2C:  18.9 ml/m2  LA Vol Index BP:   17.4 ml/m2  LA Area A4C:       15.5 cm2  LA Area A2C:       16.9 cm2  LA Major Axis A4C: 5.9 cm  LA Major Axis A2C: 5.2 cm  LA Volume Index:   16.0  ml/m2  LA Vol A4C:        31.0 ml  LA Vol A2C:        44.0 ml    RA VOLUME BY A/L METHOD:  Normal Ranges:  RA Area A4C: 17.8 cm2    AORTA MEASUREMENTS:  Normal Ranges:  Ao Sinus, d: 3.90 cm (2.1-3.5cm)  Ao STJ, d:   3.00 cm (1.7-3.4cm)  Asc Ao, d:   4.30 cm (2.1-3.4cm)    LV SYSTOLIC FUNCTION BY 2D PLANIMETRY (MOD):  Normal Ranges:  EF-A4C View: 58.4 % (>55%)  EF-A2C View: 61.8 %  EF-Biplane:  59.9 %    LV DIASTOLIC FUNCTION:  Normal Ranges:  MV Peak E:        0.55 m/s    (0.7-1.2 m/s)  MV Peak A:        0.81 m/s    (0.42-0.7 m/s)  E/A Ratio:        0.67        (1.0-2.2)  MV e'             0.07 m/s    (>8.0)  MV lateral e'     0.08 m/s  MV medial e'      0.06 m/s  MV A Dur:         130.00 msec  E/e' Ratio:       7.83        (<8.0)  PulmV A Revs Dur: 141.00 msec    MITRAL VALVE:  Normal Ranges:  MV DT: 250 msec (150-240msec)    AORTIC VALVE:  Normal Ranges:  AoV Vmax:                1.26 m/s (<1.7m/s)  AoV Peak P.4 mmHg (<20mmHg)  AoV Mean PG:             3.0 mmHg (1.7-11.5mmHg)  LVOT Max Hair:            0.98 m/s (<1.1m/s)  AoV VTI:                 22.20 cm (18-25cm)  LVOT VTI:                16.70 cm  LVOT Diameter:           2.40 cm  (1.8-2.4cm)  AoV Area, VTI:           3.40 cm2 (2.5-5.5cm2)  AoV Area,Vmax:           3.51 cm2 (2.5-4.5cm2)  AoV Dimensionless Index: 0.75    RIGHT VENTRICLE:  RV 1   3.9 cm  RV 2   3.7 cm  RV 3   6.6 cm  TAPSE: 19.0 mm  RV s'  0.13 m/s    TRICUSPID VALVE/RVSP:  Normal Ranges:  Peak TR Velocity: 1.97 m/s  RV Syst Pressure: 20.5 mmHg (< 30mmHg)  TV E Vmax:        0.29 m/s  (0.3-0.7m/s)  TV A Vmax:        0.34 m/s  IVC Diam:         2.08 cm    Pulmonary Veins:  PulmV A Revs Dur: 141.00 msec      [id number] Sol Gutierrez MD  Electronically signed on 2021 at 5:11:07 PM         Final     Stress Testing IMGRESULT([id number]:1:1825): No results found for this or any previous visit from the past 1825 days.    Cardiac Catheterization: No results found for this or any previous  visit from the past 1825 days.  No results found for this or any previous visit from the past 3650 days.     Cardiac Scoring: No results found for this or any previous visit from the past 1825 days.    AAA : No results found for this or any previous visit from the past 1825 days.    OTHER: No results found for this or any previous visit from the past 1825 days.    LAST IMAGING RESULTS  ECG 12 lead (Clinic Performed)  Atrial fibrillation, HR -91      Problem List Items Addressed This Visit       Obstructive sleep apnea syndrome in adult    Relevant Orders    ECG 12 Lead (Completed)    Follow Up In Cardiology    Basic Metabolic Panel    CBC    Magnesium    PAF (paroxysmal atrial fibrillation) (CMS/HCC)    Relevant Orders    Follow Up In Cardiology    Basic Metabolic Panel    CBC    Magnesium    Type 2 diabetes mellitus (CMS/HCC)    Relevant Orders    ECG 12 Lead (Completed)    Follow Up In Cardiology    Basic Metabolic Panel    CBC    Magnesium    Obesity (BMI 30-39.9)    Relevant Orders    ECG 12 Lead (Completed)    Follow Up In Cardiology    Basic Metabolic Panel    CBC    Magnesium    History of cardiomyopathy    Relevant Orders    ECG 12 Lead (Completed)    Follow Up In Cardiology    Basic Metabolic Panel    CBC    Magnesium    Hyperlipidemia    Relevant Orders    ECG 12 Lead (Completed)    Follow Up In Cardiology    Basic Metabolic Panel    CBC    Magnesium    ASHD (arteriosclerotic heart disease) - Primary    Relevant Orders    ECG 12 Lead (Completed)    Follow Up In Cardiology    Basic Metabolic Panel    CBC    Magnesium    Benign essential hypertension    Relevant Orders    ECG 12 Lead (Completed)    Follow Up In Cardiology    Basic Metabolic Panel    CBC    Magnesium             Denzel Nunez DO, FACC, FACOI

## 2024-03-20 ENCOUNTER — OFFICE VISIT (OUTPATIENT)
Dept: CARDIOLOGY | Facility: CLINIC | Age: 74
End: 2024-03-20
Payer: MEDICARE

## 2024-03-20 VITALS
HEIGHT: 75 IN | SYSTOLIC BLOOD PRESSURE: 120 MMHG | WEIGHT: 266.8 LBS | BODY MASS INDEX: 33.17 KG/M2 | HEART RATE: 87 BPM | DIASTOLIC BLOOD PRESSURE: 70 MMHG

## 2024-03-20 DIAGNOSIS — E66.9 OBESITY (BMI 30-39.9): ICD-10-CM

## 2024-03-20 DIAGNOSIS — G47.33 OBSTRUCTIVE SLEEP APNEA SYNDROME IN ADULT: ICD-10-CM

## 2024-03-20 DIAGNOSIS — E78.00 PURE HYPERCHOLESTEROLEMIA: ICD-10-CM

## 2024-03-20 DIAGNOSIS — E11.00 TYPE 2 DIABETES MELLITUS WITH HYPEROSMOLARITY WITHOUT COMA, WITHOUT LONG-TERM CURRENT USE OF INSULIN (MULTI): ICD-10-CM

## 2024-03-20 DIAGNOSIS — I48.0 PAF (PAROXYSMAL ATRIAL FIBRILLATION) (MULTI): ICD-10-CM

## 2024-03-20 DIAGNOSIS — I25.10 ASHD (ARTERIOSCLEROTIC HEART DISEASE): Primary | ICD-10-CM

## 2024-03-20 DIAGNOSIS — I10 BENIGN ESSENTIAL HYPERTENSION: ICD-10-CM

## 2024-03-20 DIAGNOSIS — Z86.79 HISTORY OF CARDIOMYOPATHY: ICD-10-CM

## 2024-03-20 PROCEDURE — 3078F DIAST BP <80 MM HG: CPT | Performed by: INTERNAL MEDICINE

## 2024-03-20 PROCEDURE — 3074F SYST BP LT 130 MM HG: CPT | Performed by: INTERNAL MEDICINE

## 2024-03-20 PROCEDURE — 1123F ACP DISCUSS/DSCN MKR DOCD: CPT | Performed by: INTERNAL MEDICINE

## 2024-03-20 PROCEDURE — 93000 ELECTROCARDIOGRAM COMPLETE: CPT | Performed by: INTERNAL MEDICINE

## 2024-03-20 PROCEDURE — 4010F ACE/ARB THERAPY RXD/TAKEN: CPT | Performed by: INTERNAL MEDICINE

## 2024-03-20 PROCEDURE — 99214 OFFICE O/P EST MOD 30 MIN: CPT | Performed by: INTERNAL MEDICINE

## 2024-03-20 PROCEDURE — 1036F TOBACCO NON-USER: CPT | Performed by: INTERNAL MEDICINE

## 2024-03-20 PROCEDURE — 1159F MED LIST DOCD IN RCRD: CPT | Performed by: INTERNAL MEDICINE

## 2024-04-04 ENCOUNTER — TREATMENT (OUTPATIENT)
Dept: PHYSICAL THERAPY | Facility: HOSPITAL | Age: 74
End: 2024-04-04
Payer: MEDICARE

## 2024-04-04 DIAGNOSIS — R26.89 POOR BALANCE: Chronic | ICD-10-CM

## 2024-04-04 DIAGNOSIS — M54.50 LOW BACK PAIN, UNSPECIFIED BACK PAIN LATERALITY, UNSPECIFIED CHRONICITY, UNSPECIFIED WHETHER SCIATICA PRESENT: Primary | ICD-10-CM

## 2024-04-04 DIAGNOSIS — R26.89 FUNCTIONAL GAIT ABNORMALITY: Chronic | ICD-10-CM

## 2024-04-04 PROCEDURE — 97110 THERAPEUTIC EXERCISES: CPT | Mod: GP

## 2024-04-04 ASSESSMENT — PAIN SCALES - GENERAL: PAINLEVEL_OUTOF10: 0 - NO PAIN

## 2024-04-04 ASSESSMENT — PAIN - FUNCTIONAL ASSESSMENT: PAIN_FUNCTIONAL_ASSESSMENT: 0-10

## 2024-04-04 NOTE — PROGRESS NOTES
"Physical Therapy    Physical Therapy Treatment    Patient Name: Isauro Valdez  MRN: [id number]  Today's Date: 4/4/2024  Time Calculation  Start Time: 1300  Stop Time: 1345  Time Calculation (min): 45 min    PT Therapeutic Procedures Time Entry  Therapeutic Exercise Time Entry: 45        VISIT# 2    Current Problem  Problem List Items Addressed This Visit             ICD-10-CM    Low back pain - Primary M54.50    Functional gait abnormality (Chronic) R26.89    Poor balance (Chronic) R26.89       Subjective   Pt reports back pain has not been bad since initial evaluation 3/4/24 and his knees are bothering him due to arthritis. Pt states there was so much time in between this appointment and initial evaluation due to \"slipping between the cracks\". Pt's wife reports pt was very good about doing HEP following initial eval, however, she states pt has not been doing the HEP for a few weeks now. Pt only has 2 more scheduled appointments due to $35 copay and would just like to learn exercises he can do at the rec with his wife to increase mobility. Pt's wife notes she wants pt to move around and ambulate more and would like to go on more walks.      Precautions  Precautions  STEADI Fall Risk Score (The score of 4 or more indicates an increased risk of falling): 7  Medical Precautions:  (Type 2 DM, HTN, A-fib, history of scoliosis)     Pain  Pain Assessment: 0-10  Pain Score: 0 - No pain  Pain Location: Back       Objective   Initiated POC on equipment that gym will have so pt can learn and understand gym equipment    Treatment  EXERCISES Date 3/4/2024   Date 4/4/24 Date Date    REPS REPS REPS REPS   Visit #  #2     Nustep (instruct recumbent bike for gym)  10' L3                   Shuttle  DLP  X10 2 bands  X10 3 bands with adductor ball squeeze      Shuttle SLP  X10 3 bands each     Shuttle TR/HR              Qhip Flexion       Qhip Extension       Qhip Abduction                     Hoist Quad Extension        Cybex " "Hamstring Curl              HS stretch  HEP     RB calf stretch  HEP                   T-Bands       Pulldowns  Blue TB 2x10     Rows  Blue TB 2x10     Lat pulldowns  Blue TB 2x10                   Instruct pt in safe exercise at his gym               HEP 15 min          Assessment:  Pt ambulated into session with [place] with T handle. This PT adjusted cane to appropriate height based on body positioning to improve comfort while ambulating. Demonstrated and performed exercises on the Nustep, Hoist Quad Extension, Cybex Hamstring Curl, and shuttle press to simulate exercises on gym equipment that will be similar to what pt's rec will have. Educated for pt to increase ambulation and mobility throughout the day (I.e., between commercials/episodes).No pain reports at end of session. Pt notes his legs feel \"rubbery\" due to strengthening exercises. Emphasized to pt to increase mobility throughout day and performing HEP daily to improve functional mobility, minimize fall risk, and maintain independence.     OP EDUCATION:  Outpatient Education  Individual(s) Educated: Patient, Spouse  Education Provided: Body Mechanics, Fall Risk, Home Exercise Program, POC, Posture, Other (Continue with previously prescribed HEP daily; increase ambulation throughout day between commercial breaks or end of an episode)  Risk and Benefits Discussed with Patient/Caregiver/Other: yes  Patient/Caregiver Demonstrated Understanding: yes  Plan of Care Discussed and Agreed Upon: yes  Patient Response to Education: Patient/Caregiver Verbalized Understanding of Information, Patient/Caregiver Performed Return Demonstration of Exercises/Activities, Patient/Caregiver Asked Appropriate Questions    Plan:  Demonstrate Tband exercises on gym equipment that can be performed at rec  Continue exercises on gym equipment to instruct pt through safe exercise  Provide walking program to increase pt ambulation safely and effectively  OP PT " Plan  Treatment/Interventions: Therapeutic exercises  PT Plan: Skilled PT  PT Frequency: 1 time per week    Goals:  Active       PT Problem       PT Goal 1 (Progressing)       Start:  03/04/24    Expected End:  04/17/24       Pt will be able to teach back 90% of HEP.         PT Goal 2 (Progressing)       Start:  03/04/24    Expected End:  04/17/24       Pt will demonstrate safety and understanding of use of gym equipment in order to do an independent exercise program at his gym.

## 2024-04-10 ENCOUNTER — TREATMENT (OUTPATIENT)
Dept: PHYSICAL THERAPY | Facility: HOSPITAL | Age: 74
End: 2024-04-10
Payer: MEDICARE

## 2024-04-10 DIAGNOSIS — M54.50 LOW BACK PAIN, UNSPECIFIED BACK PAIN LATERALITY, UNSPECIFIED CHRONICITY, UNSPECIFIED WHETHER SCIATICA PRESENT: ICD-10-CM

## 2024-04-10 PROCEDURE — 97110 THERAPEUTIC EXERCISES: CPT | Mod: GP,CQ

## 2024-04-10 NOTE — PROGRESS NOTES
Physical Therapy    Physical Therapy Treatment    Patient Name: Isauro Valdez  MRN: [id number]  : 1950   Today's Date: 4/10/2024  Time Calculation  Start Time: 100  Stop Time: 145  Time Calculation (min): 45 min  Visit # 3    PT Therapeutic Procedures Time Entry  Therapeutic Exercise Time Entry: 45          Current Problem  1. Low back pain, unspecified back pain laterality, unspecified chronicity, unspecified whether sciatica present  Follow Up In Physical Therapy            Subjective   Pt states he feels good with no pain       Precautions     STEADI Fall Risk Score (The score of 4 or more indicates an increased risk of falling): 7  Medical Precautions:  (Type 2 DM, HTN, A-fib, history of scoliosis)     Pain   0/10    Objective      Added exercises     Treatments:  EXERCISES Date 3/4/2024   Date 24 Date 4/10/24 Date    REPS REPS REPS REPS   Visit #  #2 #3    Nustep (instruct recumbent bike for gym)  10' L3 10' L3                  Shuttle  DLP  X10 2 bands  X10 3 bands with adductor ball squeeze  3B 2 x 10 with adductor ball squeeze    Shuttle SLP  X10 3 bands each 3B 2x 10 ea    Shuttle TR/HR              Qhip Flexion   5# 2 x 10 ea    Qhip Extension       Qhip Abduction   5# 2 x 10 ea                  Hoist Quad Extension        Cybex Hamstring Curl              HS stretch  HEP     RB calf stretch  HEP 3 x 30 sec                   T-Bands       Pulldowns  Blue TB 2x10 Blue TB 2x10    Rows  Blue TB 2x10 Blue TB 2x10    Lat pulldowns  Blue TB 2x10 Blue TB 2x10                  Instruct pt in safe exercise at his gym               HEP 15 min  Access Code: [email or web address]  URL: [email or web address]  Date: 04/10/2024  Prepared by: Delmi Soto    Exercises  - Single Arm Shoulder Extension with Anchored Resistance  - 1 x daily - 7 x weekly - 2 sets - 10 reps  - Standing Row with Resistance  - 1 x daily - 7 x weekly - 2 sets - 10 reps  - Kneeling Diagonal Chop with Anchored Resistance   - 1 x daily - 7 x weekly - 2 sets - 10 reps      Assessment:  Pt reports on Q-Hip hip flexion L hip felt it some. No increased pain with added exercises. Gave thera band HEP.        Plan:   Cont with POC to create independent HEP.     OP EDUCATION:       Goals:  Active       PT Problem       PT Goal 1 (Progressing)       Start:  03/04/24    Expected End:  04/17/24       Pt will be able to teach back 90% of HEP.         PT Goal 2 (Progressing)       Start:  03/04/24    Expected End:  04/17/24       Pt will demonstrate safety and understanding of use of gym equipment in order to do an independent exercise program at his gym.

## 2024-04-17 ENCOUNTER — TREATMENT (OUTPATIENT)
Dept: PHYSICAL THERAPY | Facility: HOSPITAL | Age: 74
End: 2024-04-17
Payer: MEDICARE

## 2024-04-17 DIAGNOSIS — M54.50 LOW BACK PAIN, UNSPECIFIED BACK PAIN LATERALITY, UNSPECIFIED CHRONICITY, UNSPECIFIED WHETHER SCIATICA PRESENT: ICD-10-CM

## 2024-04-17 DIAGNOSIS — R26.89 POOR BALANCE: Chronic | ICD-10-CM

## 2024-04-17 DIAGNOSIS — R26.89 FUNCTIONAL GAIT ABNORMALITY: Primary | Chronic | ICD-10-CM

## 2024-04-17 PROCEDURE — 97110 THERAPEUTIC EXERCISES: CPT | Mod: GP

## 2024-04-17 ASSESSMENT — PAIN SCALES - GENERAL: PAINLEVEL_OUTOF10: 3

## 2024-04-17 ASSESSMENT — ENCOUNTER SYMPTOMS
OCCASIONAL FEELINGS OF UNSTEADINESS: 0
LOSS OF SENSATION IN FEET: 1
DEPRESSION: 0

## 2024-04-17 ASSESSMENT — PAIN - FUNCTIONAL ASSESSMENT: PAIN_FUNCTIONAL_ASSESSMENT: 0-10

## 2024-04-17 NOTE — PROGRESS NOTES
"Physical Therapy    Physical Therapy Treatment/Discharge Summary    Patient Name: Isauro Valdez  MRN: [id number]  Today's Date: 4/17/2024  Time Calculation  Start Time: 1330  Stop Time: 1415  Time Calculation (min): 45 min  PT Therapeutic Procedures Time Entry  Therapeutic Exercise Time Entry: 45  VISIT# 4  Auth Dates: 3/4/24-6/1/24  Auth Visits: 8    Current Problem  Problem List Items Addressed This Visit             ICD-10-CM    Low back pain M54.50    Functional gait abnormality - Primary (Chronic) R26.89    Poor balance (Chronic) R26.89     Subjective   Pt states he tripped over a flower pot when bringing in patio furniture on Saturday, 4/13. Pt states he was not severely injured and had a \"knot\" on his RLE that has since dissipated. Pt states he has been doing the HEP that was provided to him last session and they are becoming easier. Pt notes having to take stairs at his daughters apartment a few weeks ago and was able to negotiate stairs with the cane with more ease compared to previously. Pt states he would like this to be his last appointment due to cost per session.     Precautions  Precautions  STEADI Fall Risk Score (The score of 4 or more indicates an increased risk of falling): 6  Medical Precautions:  (Type 2 DM, HTN, A-fib, history of scoliosis)     Pain  Pain Assessment: 0-10  Pain Score: 3  Pain Location: Back     Objective   Outcome Measures:  Other Measures  Oswestry Disablity Index (CATHERINE): 30% (15/50)     Spine ROM: WNL Unless documented below:  ROM (In degrees)   Flexion 45   Extension 3    Right Left   Side Bend 10 12       Lower Extremity Strength: (5/5 unless noted)   RIGHT LEFT   Hip Flexion 4 4   Hip Abduction 4- 4-   Knee Extension 4 4   Knee Flexion 4+ 4+   Ankle DF 4+ 4+     Balance:  Romberg: Eyes open 30 sec mild difficulty/hip sway       Eyes closed 30 sec with increased ankle strategies and increased FW trunk lean  Semi-tandem: 15 sec   Instructed and demonstrated pt to position in " tandem stance, however, pt IND positioned in semi-tandem     TUG: 15 seconds without device - same from initial evaluation    Gait:   - Decreased misha and step length, lack hip/knee flexion during swing and hip extension during push-off  - Pt stability improvements noted throughout gait when ambulating with cane vs ambulating without cane.   - Improvements noted in toe out intermittently throughout gait cycle; pt states he has been more mindful in maintaining neutral foot position throughout gait cycle    Stairs:   With 2 rails: 4 standard stairs, reciprocally with mikie UE support  With 1 rail: 4 standard stairs, unilateral UE support at railing, cane in contralateral UE, non reciprocally     Treatments:  EXERCISES Date 3/4/2024   Date 4/4/24 Date 4/10/24 Date 4/17/24    REPS REPS REPS REPS   Visit #  #2 #3 #4   Nustep (instruct recumbent bike for gym)  10' L3 10' L3 5' L3                 Shuttle  DLP  X10 2 bands  X10 3 bands with adductor ball squeeze  3B 2 x 10 with adductor ball squeeze    Shuttle SLP  X10 3 bands each 3B 2x 10 ea    Shuttle TR/HR              Qhip Flexion   5# 2 x 10 ea    Qhip Extension       Qhip Abduction   5# 2 x 10 ea                  Hoist Quad Extension        Cybex Hamstring Curl              HS stretch  HEP     RB calf stretch  HEP 3 x 30 sec                   T-Bands       Pulldowns  Blue TB 2x10 Blue TB 2x10    Rows  Blue TB 2x10 Blue TB 2x10    Lat pulldowns  Blue TB 2x10 Blue TB 2x10                  Instruct pt in safe exercise at his gym     Pt instructed this PT through safe exercise and how to utilize gym equipment for carry over          HEP 15 min  Access Code: [email or web address]  URL: [email or web address]  Date: 04/10/2024  Prepared by: Delmi Soto    Exercises  - Single Arm Shoulder Extension with Anchored Resistance  - 1 x daily - 7 x weekly - 2 sets - 10 reps  - Standing Row with Resistance  - 1 x daily - 7 x weekly - 2 sets - 10 reps  - Kneeling  Diagonal Chop with Anchored Resistance  - 1 x daily - 7 x weekly - 2 sets - 10 reps      Assessment:  Recheck performed this date. Pt demonstrates improvements in LE strength, balance, stair negotiation, and CATHERINE score compared to findings on initial evaluation. Pt states he is feeling better and getting around easier while feeling confident in his ability to utilize various machines at the gym at this time. This PT had pt ambulate around gym to explain purpose of various gym equipment and how to safely utilize the equipment. Pt was able to explain purpose and how to maneuver equipment with reminders of specific placement/positioning. Pt was able to verbalize and demonstrate multiple exercises on HEP once reminders were provided of names of exercises. Pt states he has his exercise print out hanging upon his fridge to assist with reminders of each exercise. Despite pt not meeting goals due to requiring intermittent reminders, this pt has made good progress throughout his duration in PT. Per pt request and continued progress made, pt is appropriate to discharge from PT at this time.     OP EDUCATION:  Outpatient Education  Individual(s) Educated: Patient  Education Provided: Body Mechanics, Fall Risk, Home Exercise Program, POC, Posture  Risk and Benefits Discussed with Patient/Caregiver/Other: yes  Patient/Caregiver Demonstrated Understanding: yes  Plan of Care Discussed and Agreed Upon: yes  Patient Response to Education: Patient/Caregiver Verbalized Understanding of Information, Patient/Caregiver Asked Appropriate Questions  Pt educated on maintaining focus on balance and ambulation throughout the day to minimize fall risk and prevent falls like the one pt had this past weekend. Pt educated on risks if falls continue. Pt educated to call with any questions, comments, or concerns.     Plan:  Discharging from current POC   OP PT Plan  Treatment/Interventions: Therapeutic exercises  PT Plan: Skilled PT  PT Frequency:  Other (Comment) (Discharge Date)    HEP:   Access Code: [email or web address]  Date: 04/10/2024  Exercises  - Single Arm Shoulder Extension with Anchored Resistance  - 1 x daily - 7 x weekly - 2 sets - 10 reps  - Standing Row with Resistance  - 1 x daily - 7 x weekly - 2 sets - 10 reps  - Kneeling Diagonal Chop with Anchored Resistance  - 1 x daily - 7 x weekly - 2 sets - 10 reps    AHA Beginner Walking Plan Handout provided to pt as a safe guideline to follow as pt and spouse's goal is to ambulate [facility] blocks [facility] and ambulate back home.       Goals:  Resolved       PT Problem       PT Goal 1 (Adequate for Discharge)       Start:  03/04/24    Expected End:  04/17/24       Pt will be able to teach back 90% of HEP.      Goal Note       Pt reports he continues to perform HEP daily and always has his sheets to help assist with reminder of exercises. Pt is able to explain exercise and safety with verbal reminder of exercises previously prescribed.               PT Goal 2 (Adequate for Discharge)       Start:  03/04/24    Expected End:  04/17/24       Pt will demonstrate safety and understanding of use of gym equipment in order to do an independent exercise program at his gym.       Goal Note       Pt is able to explain safety and use of equipment to simulate independent exercise program at his gym with some reminders.

## 2024-04-22 DIAGNOSIS — Z86.79 PERSONAL HISTORY OF OTHER DISEASES OF THE CIRCULATORY SYSTEM: ICD-10-CM

## 2024-04-22 DIAGNOSIS — E11.49 TYPE 2 DIABETES MELLITUS WITH OTHER DIABETIC NEUROLOGICAL COMPLICATION (MULTI): ICD-10-CM

## 2024-04-22 DIAGNOSIS — E66.9 OBESITY, UNSPECIFIED: ICD-10-CM

## 2024-04-23 RX ORDER — FAMOTIDINE 20 MG/1
TABLET, FILM COATED ORAL
Qty: 90 TABLET | Refills: 0 | Status: SHIPPED | OUTPATIENT
Start: 2024-04-23

## 2024-04-23 RX ORDER — HYDROCHLOROTHIAZIDE 25 MG/1
TABLET ORAL
Qty: 90 TABLET | Refills: 0 | Status: SHIPPED | OUTPATIENT
Start: 2024-04-23

## 2024-04-23 RX ORDER — METFORMIN HYDROCHLORIDE 1000 MG/1
TABLET ORAL
Qty: 180 TABLET | Refills: 0 | Status: SHIPPED | OUTPATIENT
Start: 2024-04-23

## 2024-05-13 ENCOUNTER — LAB (OUTPATIENT)
Dept: LAB | Facility: LAB | Age: 74
End: 2024-05-13
Payer: MEDICARE

## 2024-05-13 DIAGNOSIS — I50.42 CHRONIC COMBINED SYSTOLIC AND DIASTOLIC CHF (CONGESTIVE HEART FAILURE) (MULTI): ICD-10-CM

## 2024-05-13 DIAGNOSIS — E11.65 POORLY CONTROLLED TYPE 2 DIABETES MELLITUS (MULTI): ICD-10-CM

## 2024-05-13 DIAGNOSIS — I48.0 PAROXYSMAL ATRIAL FIBRILLATION (MULTI): ICD-10-CM

## 2024-05-13 DIAGNOSIS — E11.40 CONTROLLED TYPE 2 DIABETES MELLITUS WITH DIABETIC NEUROPATHY, WITHOUT LONG-TERM CURRENT USE OF INSULIN (MULTI): ICD-10-CM

## 2024-05-13 DIAGNOSIS — M54.50 LOW BACK PAIN, UNSPECIFIED BACK PAIN LATERALITY, UNSPECIFIED CHRONICITY, UNSPECIFIED WHETHER SCIATICA PRESENT: ICD-10-CM

## 2024-05-13 DIAGNOSIS — E79.0 HYPERURICEMIA: ICD-10-CM

## 2024-05-13 DIAGNOSIS — E78.5 HYPERLIPIDEMIA, UNSPECIFIED HYPERLIPIDEMIA TYPE: ICD-10-CM

## 2024-05-13 DIAGNOSIS — I48.0 PAF (PAROXYSMAL ATRIAL FIBRILLATION) (MULTI): ICD-10-CM

## 2024-05-13 LAB
ALBUMIN SERPL BCP-MCNC: 4.8 G/DL (ref 3.4–5)
ALP SERPL-CCNC: 48 U/L (ref 33–136)
ALT SERPL W P-5'-P-CCNC: 51 U/L (ref 10–52)
ANION GAP SERPL CALC-SCNC: 15 MMOL/L (ref 10–20)
AST SERPL W P-5'-P-CCNC: 43 U/L (ref 9–39)
BASOPHILS # BLD AUTO: 0.05 X10*3/UL (ref 0–0.1)
BASOPHILS NFR BLD AUTO: 0.9 %
BILIRUB SERPL-MCNC: 0.5 MG/DL (ref 0–1.2)
BUN SERPL-MCNC: 30 MG/DL (ref 6–23)
CALCIUM SERPL-MCNC: 10.9 MG/DL (ref 8.6–10.3)
CHLORIDE SERPL-SCNC: 102 MMOL/L (ref 98–107)
CHOLEST SERPL-MCNC: 126 MG/DL (ref 0–199)
CHOLESTEROL/HDL RATIO: 3.7
CO2 SERPL-SCNC: 28 MMOL/L (ref 21–32)
CREAT SERPL-MCNC: 1.34 MG/DL (ref 0.5–1.3)
CREAT UR-MCNC: 79.3 MG/DL (ref 20–370)
EGFRCR SERPLBLD CKD-EPI 2021: 57 ML/MIN/1.73M*2
EOSINOPHIL # BLD AUTO: 0.28 X10*3/UL (ref 0–0.4)
EOSINOPHIL NFR BLD AUTO: 5 %
ERYTHROCYTE [DISTWIDTH] IN BLOOD BY AUTOMATED COUNT: 14.4 % (ref 11.5–14.5)
EST. AVERAGE GLUCOSE BLD GHB EST-MCNC: 166 MG/DL
GLUCOSE SERPL-MCNC: 193 MG/DL (ref 74–99)
HBA1C MFR BLD: 7.4 %
HCT VFR BLD AUTO: 46.5 % (ref 41–52)
HDLC SERPL-MCNC: 34.2 MG/DL
HGB BLD-MCNC: 15.1 G/DL (ref 13.5–17.5)
IMM GRANULOCYTES # BLD AUTO: 0.02 X10*3/UL (ref 0–0.5)
IMM GRANULOCYTES NFR BLD AUTO: 0.4 % (ref 0–0.9)
LDLC SERPL CALC-MCNC: 55 MG/DL
LYMPHOCYTES # BLD AUTO: 1.29 X10*3/UL (ref 0.8–3)
LYMPHOCYTES NFR BLD AUTO: 22.9 %
MCH RBC QN AUTO: 30.6 PG (ref 26–34)
MCHC RBC AUTO-ENTMCNC: 32.5 G/DL (ref 32–36)
MCV RBC AUTO: 94 FL (ref 80–100)
MICROALBUMIN UR-MCNC: 17.4 MG/L
MICROALBUMIN/CREAT UR: 21.9 UG/MG CREAT
MONOCYTES # BLD AUTO: 0.55 X10*3/UL (ref 0.05–0.8)
MONOCYTES NFR BLD AUTO: 9.8 %
NEUTROPHILS # BLD AUTO: 3.45 X10*3/UL (ref 1.6–5.5)
NEUTROPHILS NFR BLD AUTO: 61 %
NON HDL CHOLESTEROL: 92 MG/DL (ref 0–149)
NRBC BLD-RTO: 0 /100 WBCS (ref 0–0)
PLATELET # BLD AUTO: 214 X10*3/UL (ref 150–450)
POTASSIUM SERPL-SCNC: 4.1 MMOL/L (ref 3.5–5.3)
PROT SERPL-MCNC: 7.3 G/DL (ref 6.4–8.2)
RBC # BLD AUTO: 4.93 X10*6/UL (ref 4.5–5.9)
SODIUM SERPL-SCNC: 141 MMOL/L (ref 136–145)
TRIGL SERPL-MCNC: 182 MG/DL (ref 0–149)
TSH SERPL-ACNC: 2.76 MIU/L (ref 0.44–3.98)
URATE SERPL-MCNC: 7.2 MG/DL (ref 4–7.5)
VLDL: 36 MG/DL (ref 0–40)
WBC # BLD AUTO: 5.6 X10*3/UL (ref 4.4–11.3)

## 2024-05-13 PROCEDURE — 82570 ASSAY OF URINE CREATININE: CPT

## 2024-05-13 PROCEDURE — 85025 COMPLETE CBC W/AUTO DIFF WBC: CPT

## 2024-05-13 PROCEDURE — 80053 COMPREHEN METABOLIC PANEL: CPT

## 2024-05-13 PROCEDURE — 84443 ASSAY THYROID STIM HORMONE: CPT

## 2024-05-13 PROCEDURE — 83036 HEMOGLOBIN GLYCOSYLATED A1C: CPT

## 2024-05-13 PROCEDURE — 36415 COLL VENOUS BLD VENIPUNCTURE: CPT

## 2024-05-13 PROCEDURE — 82043 UR ALBUMIN QUANTITATIVE: CPT

## 2024-05-13 PROCEDURE — 80061 LIPID PANEL: CPT

## 2024-05-13 PROCEDURE — 84550 ASSAY OF BLOOD/URIC ACID: CPT

## 2024-06-02 DIAGNOSIS — E78.5 HYPERLIPIDEMIA, UNSPECIFIED: ICD-10-CM

## 2024-06-03 RX ORDER — MONTELUKAST SODIUM 4 MG/1
TABLET, CHEWABLE ORAL
Qty: 360 TABLET | Refills: 5 | Status: SHIPPED | OUTPATIENT
Start: 2024-06-03

## 2024-06-26 ENCOUNTER — APPOINTMENT (OUTPATIENT)
Dept: ENDOCRINOLOGY | Facility: CLINIC | Age: 74
End: 2024-06-26
Payer: MEDICARE

## 2024-07-15 DIAGNOSIS — I48.0 PAROXYSMAL ATRIAL FIBRILLATION (MULTI): ICD-10-CM

## 2024-07-15 RX ORDER — APIXABAN 5 MG/1
5 TABLET, FILM COATED ORAL 2 TIMES DAILY
Qty: 60 TABLET | Refills: 4 | Status: SHIPPED | OUTPATIENT
Start: 2024-07-15

## 2024-07-20 DIAGNOSIS — Z86.79 PERSONAL HISTORY OF OTHER DISEASES OF THE CIRCULATORY SYSTEM: ICD-10-CM

## 2024-07-24 RX ORDER — HYDROCHLOROTHIAZIDE 25 MG/1
TABLET ORAL
Qty: 90 TABLET | Refills: 0 | Status: SHIPPED | OUTPATIENT
Start: 2024-07-24

## 2024-08-02 DIAGNOSIS — E11.49 TYPE 2 DIABETES MELLITUS WITH OTHER DIABETIC NEUROLOGICAL COMPLICATION (MULTI): ICD-10-CM

## 2024-08-02 RX ORDER — EXENATIDE 2 MG/.85ML
INJECTION, SUSPENSION, EXTENDED RELEASE SUBCUTANEOUS
Qty: 9 EACH | Refills: 3 | Status: SHIPPED | OUTPATIENT
Start: 2024-08-02 | End: 2025-08-02

## 2024-08-06 ENCOUNTER — TELEPHONE (OUTPATIENT)
Dept: PRIMARY CARE | Facility: CLINIC | Age: 74
End: 2024-08-06
Payer: MEDICARE

## 2024-08-06 DIAGNOSIS — E11.49 TYPE 2 DIABETES MELLITUS WITH OTHER DIABETIC NEUROLOGICAL COMPLICATION (MULTI): ICD-10-CM

## 2024-08-06 DIAGNOSIS — E11.42 TYPE 2 DIABETES MELLITUS WITH DIABETIC POLYNEUROPATHY, WITHOUT LONG-TERM CURRENT USE OF INSULIN (MULTI): ICD-10-CM

## 2024-08-06 RX ORDER — METFORMIN HYDROCHLORIDE 1000 MG/1
TABLET ORAL
Qty: 180 TABLET | Refills: 0 | Status: SHIPPED | OUTPATIENT
Start: 2024-08-06

## 2024-08-06 RX ORDER — EXENATIDE 2 MG/.85ML
2 INJECTION, SUSPENSION, EXTENDED RELEASE SUBCUTANEOUS
Qty: 9 EACH | Refills: 3 | Status: SHIPPED | OUTPATIENT
Start: 2024-08-11 | End: 2025-08-11

## 2024-08-06 RX ORDER — PIOGLITAZONEHYDROCHLORIDE 45 MG/1
45 TABLET ORAL DAILY
Qty: 90 TABLET | Refills: 1 | Status: SHIPPED | OUTPATIENT
Start: 2024-08-06 | End: 2025-02-02

## 2024-08-06 NOTE — TELEPHONE ENCOUNTER
Patient called in stating that the pharmacy told him this script came to the pharmacy and they were unable to read it? Can you send this in again    exenatide microspheres (Bydureon BCise) 2 mg/0.85 mL auto-injector     Pharmacy: [facility], [place]

## 2024-08-08 DIAGNOSIS — E83.52 HYPERCALCEMIA: ICD-10-CM

## 2024-08-08 RX ORDER — GLIMEPIRIDE 1 MG/1
1 TABLET ORAL DAILY
Qty: 90 TABLET | Refills: 1 | Status: SHIPPED | OUTPATIENT
Start: 2024-08-08

## 2024-08-09 DIAGNOSIS — E11.42 TYPE 2 DIABETES MELLITUS WITH DIABETIC POLYNEUROPATHY, WITHOUT LONG-TERM CURRENT USE OF INSULIN (MULTI): Primary | ICD-10-CM

## 2024-08-09 DIAGNOSIS — E66.9 OBESITY, UNSPECIFIED: ICD-10-CM

## 2024-08-09 RX ORDER — FAMOTIDINE 20 MG/1
TABLET, FILM COATED ORAL
Qty: 90 TABLET | Refills: 1 | Status: SHIPPED | OUTPATIENT
Start: 2024-08-09

## 2024-08-09 RX ORDER — BLOOD SUGAR DIAGNOSTIC
1 STRIP MISCELLANEOUS 3 TIMES DAILY
Qty: 100 EACH | Refills: 3 | Status: SHIPPED | OUTPATIENT
Start: 2024-08-09

## 2024-08-09 RX ORDER — BLOOD SUGAR DIAGNOSTIC
STRIP MISCELLANEOUS
Qty: 100 STRIP | Refills: 11 | Status: SHIPPED | OUTPATIENT
Start: 2024-08-09

## 2024-08-14 DIAGNOSIS — E11.49 TYPE 2 DIABETES MELLITUS WITH OTHER DIABETIC NEUROLOGICAL COMPLICATION (MULTI): ICD-10-CM

## 2024-08-14 RX ORDER — METFORMIN HYDROCHLORIDE 1000 MG/1
TABLET ORAL
Qty: 180 TABLET | Refills: 0 | Status: SHIPPED | OUTPATIENT
Start: 2024-08-14

## 2024-08-19 ENCOUNTER — TELEPHONE (OUTPATIENT)
Dept: PRIMARY CARE | Facility: CLINIC | Age: 74
End: 2024-08-19

## 2024-08-19 ENCOUNTER — APPOINTMENT (OUTPATIENT)
Dept: PRIMARY CARE | Facility: CLINIC | Age: 74
End: 2024-08-19
Payer: MEDICARE

## 2024-08-19 VITALS
HEART RATE: 93 BPM | SYSTOLIC BLOOD PRESSURE: 138 MMHG | OXYGEN SATURATION: 92 % | TEMPERATURE: 97.6 F | DIASTOLIC BLOOD PRESSURE: 78 MMHG | HEIGHT: 75 IN | BODY MASS INDEX: 32.67 KG/M2 | WEIGHT: 262.8 LBS | RESPIRATION RATE: 16 BRPM

## 2024-08-19 DIAGNOSIS — E11.65 POORLY CONTROLLED TYPE 2 DIABETES MELLITUS (MULTI): ICD-10-CM

## 2024-08-19 DIAGNOSIS — Z00.00 ROUTINE ADULT HEALTH MAINTENANCE: ICD-10-CM

## 2024-08-19 DIAGNOSIS — I25.10 ASHD (ARTERIOSCLEROTIC HEART DISEASE): ICD-10-CM

## 2024-08-19 DIAGNOSIS — I10 BENIGN ESSENTIAL HYPERTENSION: ICD-10-CM

## 2024-08-19 DIAGNOSIS — R26.89 FUNCTIONAL GAIT ABNORMALITY: Chronic | ICD-10-CM

## 2024-08-19 DIAGNOSIS — E79.0 HYPERURICEMIA: ICD-10-CM

## 2024-08-19 DIAGNOSIS — Z79.899 MEDICATION MANAGEMENT: ICD-10-CM

## 2024-08-19 DIAGNOSIS — Z00.00 ROUTINE GENERAL MEDICAL EXAMINATION AT HEALTH CARE FACILITY: Primary | ICD-10-CM

## 2024-08-19 DIAGNOSIS — N18.31 STAGE 3A CHRONIC KIDNEY DISEASE (MULTI): ICD-10-CM

## 2024-08-19 DIAGNOSIS — E83.52 HYPERCALCEMIA: ICD-10-CM

## 2024-08-19 PROCEDURE — 4010F ACE/ARB THERAPY RXD/TAKEN: CPT | Performed by: INTERNAL MEDICINE

## 2024-08-19 PROCEDURE — 1157F ADVNC CARE PLAN IN RCRD: CPT | Performed by: INTERNAL MEDICINE

## 2024-08-19 PROCEDURE — 3078F DIAST BP <80 MM HG: CPT | Performed by: INTERNAL MEDICINE

## 2024-08-19 PROCEDURE — 3051F HG A1C>EQUAL 7.0%<8.0%: CPT | Performed by: INTERNAL MEDICINE

## 2024-08-19 PROCEDURE — 3008F BODY MASS INDEX DOCD: CPT | Performed by: INTERNAL MEDICINE

## 2024-08-19 PROCEDURE — 1159F MED LIST DOCD IN RCRD: CPT | Performed by: INTERNAL MEDICINE

## 2024-08-19 PROCEDURE — 3075F SYST BP GE 130 - 139MM HG: CPT | Performed by: INTERNAL MEDICINE

## 2024-08-19 PROCEDURE — 3077F SYST BP >= 140 MM HG: CPT | Performed by: INTERNAL MEDICINE

## 2024-08-19 PROCEDURE — 1123F ACP DISCUSS/DSCN MKR DOCD: CPT | Performed by: INTERNAL MEDICINE

## 2024-08-19 PROCEDURE — G0439 PPPS, SUBSEQ VISIT: HCPCS | Performed by: INTERNAL MEDICINE

## 2024-08-19 PROCEDURE — 3061F NEG MICROALBUMINURIA REV: CPT | Performed by: INTERNAL MEDICINE

## 2024-08-19 PROCEDURE — 99215 OFFICE O/P EST HI 40 MIN: CPT | Performed by: INTERNAL MEDICINE

## 2024-08-19 PROCEDURE — 1170F FXNL STATUS ASSESSED: CPT | Performed by: INTERNAL MEDICINE

## 2024-08-19 PROCEDURE — 1160F RVW MEDS BY RX/DR IN RCRD: CPT | Performed by: INTERNAL MEDICINE

## 2024-08-19 PROCEDURE — 3048F LDL-C <100 MG/DL: CPT | Performed by: INTERNAL MEDICINE

## 2024-08-19 PROCEDURE — 1036F TOBACCO NON-USER: CPT | Performed by: INTERNAL MEDICINE

## 2024-08-19 RX ORDER — GLIMEPIRIDE 1 MG/1
1 TABLET ORAL 2 TIMES DAILY
Qty: 180 TABLET | Refills: 1 | Status: SHIPPED | OUTPATIENT
Start: 2024-08-19

## 2024-08-19 RX ORDER — BLOOD-GLUCOSE,RECEIVER,CONT
EACH MISCELLANEOUS
Qty: 1 EACH | Refills: 0 | Status: SHIPPED | OUTPATIENT
Start: 2024-08-19 | End: 2024-08-21

## 2024-08-19 RX ORDER — BLOOD-GLUCOSE SENSOR
EACH MISCELLANEOUS
Qty: 3 EACH | Refills: 6 | Status: SHIPPED | OUTPATIENT
Start: 2024-08-19 | End: 2024-08-21

## 2024-08-19 SDOH — ECONOMIC STABILITY: FOOD INSECURITY: WITHIN THE PAST 12 MONTHS, THE FOOD YOU BOUGHT JUST DIDN'T LAST AND YOU DIDN'T HAVE MONEY TO GET MORE.: NEVER TRUE

## 2024-08-19 SDOH — ECONOMIC STABILITY: FOOD INSECURITY: WITHIN THE PAST 12 MONTHS, YOU WORRIED THAT YOUR FOOD WOULD RUN OUT BEFORE YOU GOT MONEY TO BUY MORE.: NEVER TRUE

## 2024-08-19 ASSESSMENT — LIFESTYLE VARIABLES
HOW OFTEN DO YOU HAVE SIX OR MORE DRINKS ON ONE OCCASION: NEVER
AUDIT-C TOTAL SCORE: 0
HOW OFTEN DO YOU HAVE A DRINK CONTAINING ALCOHOL: NEVER
HOW MANY STANDARD DRINKS CONTAINING ALCOHOL DO YOU HAVE ON A TYPICAL DAY: PATIENT DOES NOT DRINK
SKIP TO QUESTIONS 9-10: 1

## 2024-08-19 ASSESSMENT — ENCOUNTER SYMPTOMS
LOSS OF SENSATION IN FEET: 1
DEPRESSION: 0
OCCASIONAL FEELINGS OF UNSTEADINESS: 1

## 2024-08-19 ASSESSMENT — ACTIVITIES OF DAILY LIVING (ADL)
TAKING_MEDICATION: INDEPENDENT
DRESSING: INDEPENDENT
DOING_HOUSEWORK: INDEPENDENT
GROCERY_SHOPPING: INDEPENDENT
BATHING: INDEPENDENT
MANAGING_FINANCES: INDEPENDENT

## 2024-08-19 NOTE — ASSESSMENT & PLAN NOTE
Will order a Rolator walker to help with ambulation, will see if this is helpful, encourage ambulation

## 2024-08-19 NOTE — ASSESSMENT & PLAN NOTE
Trial of Dexcom 7 CGM ordered, will consult clinical pharmacy also for financial management if needed

## 2024-08-19 NOTE — PROGRESS NOTES
Subjective   Reason for Visit: Isauro Valdez is an 73 y.o. male here for a Medicare Wellness visit.     Past Medical, Surgical, and Family History reviewed and updated in chart.    Reviewed all medications by prescribing practitioner or clinical pharmacist (such as prescriptions, OTCs, herbal therapies and supplements) and documented in the medical record.    HPI    Follow up and Medicare wellness exam:     He appears to be due for lab work soon:     DM type 2: Patient takes Bydureon, Actos, metformin, Jardiance., glimepiride now, glucoses have improved , he has seen endocrinology also (Dr Shah). Glimepiride dose was increased by Dr Harry. Labs were completed in 5/2024 , patient admits that he does not walk, he uses a cane now. Wife has been encouraging the patient to walk, he has a gym membership now, he has not been very active. He does not have a CGM     CAD/ CHF/ HTN: he takes carvedilol, clonidine, HCTZ, Losartan, furosemide, and amlodipine. Patient sees Dr Nunez also (cardiology)    Atrial fib: patient takes Eliquis twice daily, he requests paperwork, he spends over $100 per month on Eliquis     hyperlipidemia: he takes rosuvastatin and colestipol      back pain: patient takes gabapentin at bedtime, he takes 600 mg at bedtime, it helps with restless leg issues also per recommendation of Dr Angel (sleep medicine), he now takes iron once daily also to help with restless legs. he does have an iron deficiency. He no longer takes iron. Patient had PT, he was treated for hip pain he states, the pain that he has currently is in the back. Patient does request a Rolator walker. Patient's wife states that the patient does not ambulate     Colon polyps: he had a follow up colonoscopy completed     BPH/ spot on the kidney: he takes saw michelle, he saw Dr Thompson.  Patient was advised by Dr Thompson that he did not require a urology follow up now.      Gout: he now takes allopurinol, 200 mg daily, he uses colchicine  "as needed      Patient Care Team:  Javier Hong MD as PCP - General (Internal Medicine)  Javier Hong MD as PCP - [employer] PCP     Review of Systems     otherwise negative aside from what was mentioned above in HPI.     Objective   Vitals:  /79 (BP Location: Right arm, Patient Position: Sitting, BP Cuff Size: Adult)   Pulse 93   Temp 36.4 °C (97.6 °F)   Resp 16   Ht 1.905 m (6' 3\")   Wt 119 kg (262 lb 12.8 oz)   SpO2 92%   BMI 32.85 kg/m²       Physical Exam    Gen: Alert, NAD, he is obese, accompanied by wife, he ambulates with a cane  HEENT:  PERRLA, EOMI, conjunctiva and sclera normal in appearance, wearing glasses, no thyromegaly or neck masses  Respiratory:  Lungs CTAB  Cardiovascular:  Heart: regular  rate today and rhythm noted today . No M/R/G, no carotid bruits, no peripheral edema noted  Abdominal obesity is noted  Neuro:  Gross motor and sensory intact  Skin:  No suspicious lesions present    Assessment/Plan   Problem List Items Addressed This Visit             ICD-10-CM    Functional gait abnormality (Chronic) R26.89     Will order a Rolator walker to help with ambulation, will see if this is helpful, encourage ambulation         Relevant Medications    walker misc    Other Relevant Orders    CBC and Auto Differential    Comprehensive Metabolic Panel    ASHD (arteriosclerotic heart disease) I25.10     Patient sees cardiology, no med changes at present         Relevant Medications    walker misc    Other Relevant Orders    CBC and Auto Differential    Comprehensive Metabolic Panel    Lipid Panel    Benign essential hypertension I10     Fair control of BP now, no changes         Relevant Orders    CBC and Auto Differential    Comprehensive Metabolic Panel    Hypercalcemia E83.52    Relevant Medications    glimepiride (Amaryl) 1 mg tablet    Other Relevant Orders    CBC and Auto Differential    Comprehensive Metabolic Panel    Hyperuricemia E79.0    Relevant " Orders    CBC and Auto Differential    Comprehensive Metabolic Panel    Uric acid    Poorly controlled type 2 diabetes mellitus (Multi) E11.65     Trial of Dexcom 7 CGM ordered, will consult clinical pharmacy also for financial management if needed         Relevant Medications    Dexcom G7 Sensor device    Dexcom G7  misc    Other Relevant Orders    CBC and Auto Differential    Comprehensive Metabolic Panel    Hemoglobin A1C    Albumin-Creatinine Ratio, Urine Random    Routine general medical examination at health care facility - Primary Z00.00     RSV vaccine ordered. Clinical pharmacy referral for help with med costs         Relevant Orders    CBC and Auto Differential    Comprehensive Metabolic Panel    Stage 3a chronic kidney disease (Multi) N18.31     Continue to monitor for now         Relevant Orders    CBC and Auto Differential    Comprehensive Metabolic Panel    Albumin-Creatinine Ratio, Urine Random    Iron and TIBC     Other Visit Diagnoses         Codes    Routine adult health maintenance     Z00.00    Relevant Medications    respiratory syncytial virus, RSV, vaccine, adjuvanted, age 60y+ (Arexvy) 120 mcg/0.5 mL suspension for reconstitution    Other Relevant Orders    CBC and Auto Differential    Comprehensive Metabolic Panel    Medication management     Z79.899    Relevant Orders    Referral to Clinical Pharmacy          Medicare wellness exam completed, check labs as ordered, follow up in about 3-4 months, clinical pharmacy evaluation ordered, paperwork for Eliquis assistance completed

## 2024-08-21 ENCOUNTER — TELEPHONE (OUTPATIENT)
Dept: PRIMARY CARE | Facility: CLINIC | Age: 74
End: 2024-08-21
Payer: MEDICARE

## 2024-08-21 DIAGNOSIS — E11.65 POORLY CONTROLLED TYPE 2 DIABETES MELLITUS (MULTI): Primary | ICD-10-CM

## 2024-08-21 DIAGNOSIS — I10 ESSENTIAL (PRIMARY) HYPERTENSION: ICD-10-CM

## 2024-08-21 DIAGNOSIS — N18.31 STAGE 3A CHRONIC KIDNEY DISEASE (MULTI): ICD-10-CM

## 2024-08-21 RX ORDER — BLOOD-GLUCOSE,RECEIVER,CONT
EACH MISCELLANEOUS
Qty: 1 EACH | Refills: 0 | Status: SHIPPED | OUTPATIENT
Start: 2024-08-21

## 2024-08-21 RX ORDER — BLOOD-GLUCOSE SENSOR
EACH MISCELLANEOUS
Qty: 6 EACH | Refills: 2 | Status: SHIPPED | OUTPATIENT
Start: 2024-08-21 | End: 2024-08-21

## 2024-08-21 RX ORDER — BLOOD-GLUCOSE SENSOR
EACH MISCELLANEOUS
Qty: 2 EACH | Refills: 6 | Status: SHIPPED | OUTPATIENT
Start: 2024-08-21

## 2024-08-21 RX ORDER — AMLODIPINE BESYLATE 2.5 MG/1
TABLET ORAL
Qty: 90 TABLET | Refills: 1 | Status: SHIPPED | OUTPATIENT
Start: 2024-08-21

## 2024-08-21 RX ORDER — CARVEDILOL 25 MG/1
TABLET ORAL
Qty: 180 TABLET | Refills: 1 | Status: SHIPPED | OUTPATIENT
Start: 2024-08-21

## 2024-08-21 NOTE — TELEPHONE ENCOUNTER
Patient states insurance company denied Dexcom. It has to be april brand. Please advise.    Fax number is to his insurance company [phone]

## 2024-08-22 RX ORDER — CLONIDINE HYDROCHLORIDE 0.1 MG/1
0.1 TABLET ORAL 2 TIMES DAILY
Qty: 180 TABLET | Refills: 3 | Status: SHIPPED | OUTPATIENT
Start: 2024-08-22

## 2024-08-23 ENCOUNTER — TELEPHONE (OUTPATIENT)
Dept: PRIMARY CARE | Facility: CLINIC | Age: 74
End: 2024-08-23
Payer: MEDICARE

## 2024-09-11 DIAGNOSIS — Z86.79 PERSONAL HISTORY OF OTHER DISEASES OF THE CIRCULATORY SYSTEM: ICD-10-CM

## 2024-09-11 DIAGNOSIS — R05.8 OTHER SPECIFIED COUGH: ICD-10-CM

## 2024-09-11 DIAGNOSIS — M10.9 GOUTY ARTHRITIS OF LEFT HAND: ICD-10-CM

## 2024-09-11 DIAGNOSIS — E79.0 HYPERURICEMIA: ICD-10-CM

## 2024-09-11 DIAGNOSIS — T46.4X5A ADVERSE EFFECT OF ANGIOTENSIN-CONVERTING-ENZYME INHIBITORS, INITIAL ENCOUNTER: ICD-10-CM

## 2024-09-11 DIAGNOSIS — I48.0 PAROXYSMAL ATRIAL FIBRILLATION (MULTI): ICD-10-CM

## 2024-09-11 RX ORDER — HYDROCHLOROTHIAZIDE 25 MG/1
TABLET ORAL
Qty: 90 TABLET | Refills: 0 | Status: SHIPPED | OUTPATIENT
Start: 2024-09-11

## 2024-09-11 RX ORDER — EMPAGLIFLOZIN 25 MG/1
25 TABLET, FILM COATED ORAL DAILY
Qty: 90 TABLET | Refills: 1 | Status: SHIPPED | OUTPATIENT
Start: 2024-09-11

## 2024-09-11 RX ORDER — ALLOPURINOL 100 MG/1
200 TABLET ORAL DAILY
Qty: 180 TABLET | Refills: 1 | Status: SHIPPED | OUTPATIENT
Start: 2024-09-11

## 2024-09-11 RX ORDER — LOSARTAN POTASSIUM 100 MG/1
100 TABLET ORAL DAILY
Qty: 90 TABLET | Refills: 3 | Status: SHIPPED | OUTPATIENT
Start: 2024-09-11

## 2024-09-11 NOTE — PATIENT INSTRUCTIONS
Thank you for choosing [facility]  for your health care needs.  If you have any questions, concerns or medical needs, please feel free to contact our office at [phone].    Please ensure you complete your blood work one week before the next scheduled appointment.    To continue Bydureon 2mg SQ once weekly   To continue Pioglitazone 45mg once daily   To continue Glimepiride 1mg twice daily before breakfast and dinner   To continue Jardiance 25mg once daily   To continue Metformin 1000mg twice daily   Please check blood sugars once daily and keep a detailed log  Counseled that the goal A1C should be 7% or less  Counseled glycemic control is warranted to prevent microvascular complications  Please stick to a low carb diet   To obtain blood tests as ordered by PCP   For follow up in 6 months

## 2024-09-11 NOTE — PROGRESS NOTES
Subjective   Isauro Valdez is a 74 y.o. male who presents for follow-up of Type 2 diabetes mellitus. The initial diagnosis of diabetes was made in 2015 .     He has had no major changes to his health since his last appointment.  He now ambulates with a cane.        Known complications due to diabetes included peripheral neuropathy, CAD s/p MI in 2003 with 1 stent in situ, chronic kidney disease, and obesity    Cardiovascular risk factors include advanced age (older than 55 for men, 65 for women), diabetes mellitus, male gender, and obesity (BMI >= 30 kg/m2). The patient is on an ACE inhibitor or angiotensin II receptor blocker.  The patient has not been previously hospitalized due to diabetic ketoacidosis.     Current symptoms/problems include none. His clinical course has been stable.     The patient is currently checking the blood glucose.      Patient is using: glucometer  GLUCOSE LOG DATA:  160-180mg/dL   Highest - barely over 200mgdl    Hypoglycemia frequency: Denies   Hypoglycemia awareness: N/A     Current Medication Regimen  Bydureon 2mg SQ once weekly   Pioglitazone 45mg once daily   Glimepiride 1mg once daily   Jardiance 25mg once daily   Metformin 1000mig twice daily     MEALS: saw a nutritionist; trying a mediterrian diet; got [employer] card   Breakfast - eggs, cereal, fruit, cheese, oatmeal, breakfast burrito   Lunch - omits   Dinner - increased vegetables, chicken thigh,   Snacks - yogurt, cookies, half peanut butter and jelly   Beverages - water, coffee with Stevia     Denies exercise regimen        Review of Systems   Eyes:         Lacrimation    Respiratory:  Negative for shortness of breath.    Cardiovascular:  Negative for chest pain and leg swelling.   Genitourinary:         Occasional nocturia    Musculoskeletal:  Positive for arthralgias.   All other systems reviewed and are negative.      Objective   /72 (BP Location: Left arm, Patient Position: Sitting, BP Cuff Size: Adult)   Pulse  "84   Ht 1.905 m (6' 3\")   Wt 120 kg (265 lb)   BMI 33.12 kg/m²   Physical Exam  Vitals and nursing note reviewed.   Constitutional:       General: He is not in acute distress.     Appearance: Normal appearance. He is obese.   HENT:      Head: Normocephalic and atraumatic.      Nose: Nose normal.      Mouth/Throat:      Mouth: Mucous membranes are moist.   Eyes:      Extraocular Movements: Extraocular movements intact.   Cardiovascular:      Rate and Rhythm: Normal rate and regular rhythm.   Pulmonary:      Effort: Pulmonary effort is normal.      Breath sounds: Normal breath sounds.   Musculoskeletal:         General: Normal range of motion.   Skin:     General: Skin is warm.   Neurological:      Mental Status: He is alert and oriented to person, place, and time.   Psychiatric:         Mood and Affect: Mood normal.         Lab Review  Glucose (mg/dL)   Date Value   05/13/2024 193 (H)   12/18/2023 180 (H)   06/12/2023 151 (H)   03/15/2023 146 (H)   12/16/2022 156 (H)     Hemoglobin A1C (%)   Date Value   05/13/2024 7.4 (H)   12/18/2023 7.7 (H)   06/12/2023 7.4 (A)   12/16/2022 7.1 (A)   07/05/2022 6.8 (A)     Bicarbonate (mmol/L)   Date Value   05/13/2024 28   12/18/2023 29   06/12/2023 27   03/15/2023 30   12/16/2022 26     Urea Nitrogen (mg/dL)   Date Value   05/13/2024 30 (H)   12/18/2023 25 (H)   06/12/2023 28 (H)   03/15/2023 26 (H)   12/16/2022 34 (H)     Creatinine (mg/dL)   Date Value   05/13/2024 1.34 (H)   12/18/2023 1.23   06/12/2023 1.24   03/15/2023 1.32 (H)   12/16/2022 1.38 (H)     Lab Results   Component Value Date    CHOL 126 05/13/2024    CHOL 142 12/18/2023    CHOL 119 06/12/2023     Lab Results   Component Value Date    HDL 34.2 05/13/2024    HDL 36.8 12/18/2023    HDL 35.5 (A) 06/12/2023     Lab Results   Component Value Date    LDLCALC 55 05/13/2024    LDLCALC 63 12/18/2023     Lab Results   Component Value Date    TRIG 182 (H) 05/13/2024    TRIG 212 (H) 12/18/2023    TRIG 160 (H) 06/12/2023 "       Health Maintenance:   Foot Exam: Today at 2pm   Eye Exam:  October 24, 2023  Urine Albumin: December 18, 2023    Assessment/Plan      74-year-old male presents for follow up for type 2 diabetes mellitus. His blood pressure is at goal. He is noted to be on a statin. He has not noted to be on an ACE inhibitor.    Poorly controlled type 2 diabetes mellitus (Multi)  To continue Bydureon 2mg SQ once weekly   To continue Pioglitazone 45mg once daily   To continue Glimepiride 1mg twice daily before breakfast and dinner   To continue Jardiance 25mg once daily   To continue Metformin 1000mg twice daily   Please check blood sugars once daily and keep a detailed log  Counseled that the goal A1C should be 7% or less  Counseled glycemic control is warranted to prevent microvascular complications  Please stick to a low carb diet   To obtain blood tests as ordered by PCP   For follow up in 6 months

## 2024-09-12 ENCOUNTER — APPOINTMENT (OUTPATIENT)
Dept: ENDOCRINOLOGY | Facility: CLINIC | Age: 74
End: 2024-09-12
Payer: MEDICARE

## 2024-09-12 VITALS
WEIGHT: 265 LBS | HEART RATE: 84 BPM | DIASTOLIC BLOOD PRESSURE: 72 MMHG | HEIGHT: 75 IN | SYSTOLIC BLOOD PRESSURE: 122 MMHG | BODY MASS INDEX: 32.95 KG/M2

## 2024-09-12 DIAGNOSIS — E11.65 POORLY CONTROLLED TYPE 2 DIABETES MELLITUS (MULTI): ICD-10-CM

## 2024-09-12 DIAGNOSIS — N18.31 STAGE 3A CHRONIC KIDNEY DISEASE (MULTI): ICD-10-CM

## 2024-09-12 PROCEDURE — 3061F NEG MICROALBUMINURIA REV: CPT | Performed by: INTERNAL MEDICINE

## 2024-09-12 PROCEDURE — 3078F DIAST BP <80 MM HG: CPT | Performed by: INTERNAL MEDICINE

## 2024-09-12 PROCEDURE — 1157F ADVNC CARE PLAN IN RCRD: CPT | Performed by: INTERNAL MEDICINE

## 2024-09-12 PROCEDURE — 3008F BODY MASS INDEX DOCD: CPT | Performed by: INTERNAL MEDICINE

## 2024-09-12 PROCEDURE — 3051F HG A1C>EQUAL 7.0%<8.0%: CPT | Performed by: INTERNAL MEDICINE

## 2024-09-12 PROCEDURE — 4010F ACE/ARB THERAPY RXD/TAKEN: CPT | Performed by: INTERNAL MEDICINE

## 2024-09-12 PROCEDURE — 3048F LDL-C <100 MG/DL: CPT | Performed by: INTERNAL MEDICINE

## 2024-09-12 PROCEDURE — 99214 OFFICE O/P EST MOD 30 MIN: CPT | Performed by: INTERNAL MEDICINE

## 2024-09-12 PROCEDURE — 1159F MED LIST DOCD IN RCRD: CPT | Performed by: INTERNAL MEDICINE

## 2024-09-12 PROCEDURE — 3074F SYST BP LT 130 MM HG: CPT | Performed by: INTERNAL MEDICINE

## 2024-09-12 PROCEDURE — 1123F ACP DISCUSS/DSCN MKR DOCD: CPT | Performed by: INTERNAL MEDICINE

## 2024-09-12 PROCEDURE — G2211 COMPLEX E/M VISIT ADD ON: HCPCS | Performed by: INTERNAL MEDICINE

## 2024-09-12 PROCEDURE — 1160F RVW MEDS BY RX/DR IN RCRD: CPT | Performed by: INTERNAL MEDICINE

## 2024-09-12 RX ORDER — BLOOD-GLUCOSE,RECEIVER,CONT
EACH MISCELLANEOUS
Qty: 1 EACH | Refills: 0 | Status: SHIPPED | OUTPATIENT
Start: 2024-09-12

## 2024-09-12 RX ORDER — BLOOD-GLUCOSE SENSOR
EACH MISCELLANEOUS
Qty: 2 EACH | Refills: 11 | Status: SHIPPED | OUTPATIENT
Start: 2024-09-12

## 2024-09-12 ASSESSMENT — ENCOUNTER SYMPTOMS
SHORTNESS OF BREATH: 0
ARTHRALGIAS: 1

## 2024-09-15 NOTE — ASSESSMENT & PLAN NOTE
To continue Bydureon 2mg SQ once weekly   To continue Pioglitazone 45mg once daily   To continue Glimepiride 1mg twice daily before breakfast and dinner   To continue Jardiance 25mg once daily   To continue Metformin 1000mg twice daily   Please check blood sugars once daily and keep a detailed log  Counseled that the goal A1C should be 7% or less  Counseled glycemic control is warranted to prevent microvascular complications  Please stick to a low carb diet   To obtain blood tests as ordered by PCP   For follow up in 6 months

## 2024-09-18 PROBLEM — Z79.01 ON APIXABAN THERAPY: Status: ACTIVE | Noted: 2024-09-18

## 2024-09-18 PROBLEM — I71.21 THORACIC ASCENDING AORTIC ANEURYSM (CMS-HCC): Status: ACTIVE | Noted: 2024-09-18

## 2024-09-18 NOTE — PROGRESS NOTES
[facility] Heart and Vascular Cardiology    Patient Name: Isauro Valdez  Patient : 1950    Reason for visit:  This is a 74-year-old male here for follow-up regarding his history of coronary artery disease status post stenting of his RCA done in , history of cardiomyopathy with ejection fraction naina of 35% now improved to 60%, paroxysmal atrial fibrillation, anticoagulation with apixaban, thoracic ascending aortic aneurysm measured at 4.3 cm on echocardiogram done in , hypertension, dyslipidemia, diabetes mellitus, obstructive sleep apnea noncompliant with CPAP therapy, and obesity.     HPI:  This is a 74-year-old male here for follow-up regarding his history of coronary artery disease status post stenting of his RCA done in , history of cardiomyopathy with ejection fraction naina of 35% now improved to 60%, paroxysmal atrial fibrillation, anticoagulation with apixaban, thoracic ascending aortic aneurysm measured at 4.3 cm on echocardiogram done in , hypertension, dyslipidemia, diabetes mellitus, obstructive sleep apnea noncompliant with CPAP therapy, and obesity.  The patient was last evaluated by me in 2024.  At that visit I had ordered blood work including BMP/CBC/magnesium be drawn in 6 months and asked the patient to follow-up in 6 months.  CMP done in May 2024 showed normal serum sodium and potassium with a serum creatinine of 1.34 suggesting CKD, ALT of 51, AST of 43, hemoglobin A1c of 7.4%, TSH of 2.76, CBC showing hemoglobin of 15.1.  Lipid panel done 2024 showed an LDL cholesterol 55 and triglycerides of 182 while on rosuvastatin 20 mg daily. ECG done today showed sinus rhythm with a heart rate of 90 bpm.  The patient reports that he has been feeling generally well from the cardiac standpoint. He denies any new chest pain, shortness of breath, palpitations and lightheadedness. He states that he takes all of his medications as prescribed. During my exam, he was  resting comfortably on the exam table.            Assessment/Plan:   1. Coronary artery disease  The patient has a history of coronary artery disease status post stenting of his RCA done in 2003.  ECG done today showed sinus rhythm with a heart rate of 90 bpm.    He denies anginal chest discomfort.  Blood pressure appears controlled on exam today.  He should continue his current antihypertensive medications and antiplatelet therapy.  Recent lab works as noted in the HPI.   Lipid panel done 5/13/2024 showed an LDL cholesterol 55 and triglycerides of 182 while on rosuvastatin 20 mg daily.   Echocardiogram was ordered as noted below.   Lab works as noted below will be done today and again in 6 months prior to his next visit.  Please also see lifestyle recommendations below.  Follow up in 6 months and sooner if necessary.      2. History of cardiomyopathy  The patient has a history of a cardiomyopathy with an ejection fraction naina of 35% now improved to 60%.  Echocardiogram done in August 2021 showed normal left ventricular systolic function with an ejection fraction of 60%, low normal right ventricular systolic function, no significant valve abnormalities, and a dilated ascending aorta measured at 4.3 cm.  He does not appear significantly volume overloaded on exam today.  He should continue his current cardiac medications.  Recent lab works as noted in the HPI.   Echocardiogram was ordered as noted below.   Lab works will be done today and again in 6 months prior to the next visit.   I discussed with him the importance of following a low-sodium heart healthy diet as well as weight loss.   Follow up in 6 months and sooner if necessary.      3.  Paroxysmal atrial fibrillation  The patient has a history of paroxysmal atrial fibrillation on apixaban for thromboembolism prophylaxis which should be continued.  He should continue carvedilol for heart rate control.  ECG done today showed sinus rhythm with a heart rate of 90  bpm.    He denies chest pain, palpitations or lightheadedness.   Recent lab works as noted in the HPI.  Echocardiogram was ordered as noted below.   Lab works will be done today and again in 6 months prior to the next visit.   Follow up in 6 months and sooner if necessary.      4. Anticoagulation with apixaban  The patient is on anticoagulation with apixaban for atrial fibrillation.  Recent lab works as noted in the HPI.  Lab works will be done today and again in 6 months prior to the next visit.     5. Thoracic ascending aortic aneurysm  The patient has a history of thoracic ascending aortic aneurysm measured at 4.3 cm on echocardiogram done in 2021.  Echocardiogram done in August 2021 showed normal left ventricular systolic function with an ejection fraction of 60%, low normal right ventricular systolic function, no significant valve abnormalities, and a dilated ascending aorta measured at 4.3 cm.  Echocardiogram was ordered as noted below.   Continue risk factor modification.    6. Hypertension  Patient has a history of hypertension which appears controlled on exam today.  He should continue his current antihypertensive medications.      7. Dyslipidemia  Lipid panel done 5/13/2024 showed an LDL cholesterol 55 and triglycerides of 182 while on rosuvastatin 20 mg daily.   Lipid panel will be updated in 6 months.  Please see lifestyle recommendations below.      8. Diabetes mellitus  Hemoglobin A1c done in May 2024 was 7.4%.  Medication management per primary care provider.     9. Obstructive sleep apnea  Patient has a history of obstructive sleep apnea noncompliant with CPAP therapy.  Management as per PCP.    10. CKD  Serum creatinine done in May 2024 was 1.34 suggesting CKD.  Management as per PCP.     11. Obesity  Please see lifestyle recommendations below.       Orders:   BMP/CBC/magnesium results  CMP/lipid/magnesium/CBC in 6 months,   Echocardiogram   Follow-up in 6 months.        Lifestyle Recommendations  I  recommend a whole-food plant-based diet, an eating pattern that encourages the consumption of unrefined plant foods (such as fruits, vegetables, tubers, whole grains, legumes, nuts and seeds) and discourages meats, dairy products, eggs and processed foods.     The AHA/ACC recommends that the patient consume a dietary pattern that emphasizes intake of vegetables, fruits, and whole grains; includes low-fat dairy products, poultry, fish, legumes, non-tropical vegetable oils, and nuts; and limits intake of sodium, sweets, sugar-sweetened beverages, and red meats.  Adapt this dietary pattern to appropriate calorie requirements (a 500-750 kcal/day deficit to loose weight), personal and cultural food preferences, and nutrition therapy for other medical conditions (including diabetes).  Achieve this pattern by following plans such as the [place] Mediterranean, DASH dietary pattern, or AHA diet.     Engage in 2 hours and 30 minutes per week of moderate-intensity physical activity, or 1 hour and 15 minutes (75 minutes) per week of vigorous-intensity aerobic physical activity, or an equivalent combination of moderate and vigorous-intensity aerobic physical activity. Aerobic activity should be performed in episodes of at least 10 minutes preferably spread throughout the week.     Adhering to a heart healthy diet, regular exercise habits, avoidance of tobacco products, and maintenance of a healthy weight are crucial components of their heart disease risk reduction.     Any positive review of systems not specifically addressed in the office visit today should be evaluated and treated by the patients primary care physician or in an emergency department if necessary     Patient was notified that results from ordered tests will be called to the patient if it changes current management; it will otherwise be discussed at a future appointment and available on  [place].     Thank you for allowing me to participate in the care of this  patient.        This document was generated using the assistance of voice recognition software. If there are any errors of spelling, grammar, syntax, or meaning; please feel free to contact me directly for clarification.    Past Medical History:  He has a past medical history of Cellulitis of left lower limb (09/06/2019), Corns and callosities (09/06/2019), Coronary angioplasty status (09/06/2019), Old myocardial infarction (09/06/2019), Other forms of dyspnea (02/19/2020), Personal history of diabetic foot ulcer (09/06/2019), Personal history of other diseases of the circulatory system (10/07/2019), Personal history of other drug therapy (09/06/2019), Personal history of other drug therapy (09/06/2019), Personal history of other drug therapy (09/06/2019), Personal history of other endocrine, nutritional and metabolic disease, Personal history of other specified conditions (09/06/2019), Personal history of urinary calculi, Personal history of urinary calculi (09/06/2019), Rash and other nonspecific skin eruption (12/27/2018), and Type 2 diabetes mellitus with diabetic nephropathy (Multi) (10/07/2019).    Past Surgical History:  He has a past surgical history that includes Coronary angioplasty (05/17/2017); Ankle surgery (05/17/2017); Other surgical history (05/17/2017); Lumbar laminectomy (05/17/2017); Lithotripsy (05/17/2017); Knee arthroscopy w/ debridement (05/17/2017); Other surgical history (09/01/2022); and Foot surgery (06/12/2018).      Social History:  He reports that he has never smoked. He has quit using smokeless tobacco. He reports that he does not currently use alcohol. He reports that he does not use drugs.    Family History:  Family History   Problem Relation Name Age of Onset    Heart attack Other          Allergies:  Adhesive tape-silicones, Niacin, Nystatin, and Penicillins    Outpatient Medications:  Current Outpatient Medications   Medication Instructions    allopurinol (ZYLOPRIM) 200 mg, oral,  Daily    amLODIPine (Norvasc) 2.5 mg tablet TAKE 1 TABLET BY MOUTH EVERYDAY AT BEDTIME    ascorbic acid (Vitamin C) 500 mg tablet oral    aspirin 81 mg EC tablet oral    Bydureon BCise 2 mg, subcutaneous, Once Weekly    carvedilol (Coreg) 25 mg tablet TAKE 1 TABLET BY MOUTH TWICE A DAY WITH MORNING AND EVENING MEAL    cloNIDine (CATAPRES) 0.1 mg, oral, 2 times daily    colchicine 0.6 mg, oral, As needed    colestipol (Colestid) 1 gram tablet TAKE 2 TABLETS BY MOUTH TWICE A DAY    Eliquis 5 mg, oral, 2 times daily    famotidine (Pepcid) 20 mg tablet TAKE 1 TABLET BY MOUTH EVERY DAY AS DIRECTED    ferrous sulfate 325 (65 Fe) MG tablet Take 1 tablet (325 mg) by mouth early in the morning..    fish oil concentrate (Omega-3) 120-180 mg capsule 1 capsule, oral, Daily    FreeStyle Kalpana 3 [place] misc Use as instructed    FreeStyle Kalpana 3 Sensor device Change sensor every 14 days as directed    furosemide (LASIX) 40 mg, oral, Daily    gabapentin (NEURONTIN) 600 mg, oral, Nightly    glimepiride (AMARYL) 1 mg, oral, 2 times daily    glucosamine-chondroit-vit C-Mn 500-400 mg capsule oral    hydroCHLOROthiazide (HYDRODiuril) 25 mg tablet TAKE 1 TABLET BY MOUTH EVERY DAY    Jardiance 25 mg, oral, Daily    lansoprazole (Prevacid) 15 mg DR capsule 1 capsule, oral, Daily    losartan (COZAAR) 100 mg, oral, Daily    metFORMIN (Glucophage) 1,000 mg tablet TAKE 1 TABLET EVERY 12 HOURS DAILY    multivitamin with folic acid (One Daily Multivitamin) 400 mcg tablet 1 tablet, oral, Daily    OneTouch Ultra Test strip USE AS DIRECTED 3 TIMES A DAY    OneTouch Ultra Test strip 1 each, Does not apply, 3 times daily    pioglitazone (ACTOS) 45 mg, oral, Daily    rosuvastatin (CRESTOR) 20 mg, oral, Daily    saw palmetto 450 mg capsule 1 capsule, oral, Daily    triamcinolone (Kenalog) 0.1 % cream Topical, 2 times daily    turmeric-turmeric root extract 450-50 mg capsule 1 capsule, oral, Daily    walker misc 1 each, miscellaneous, Daily, [place]  "walker, he has ambulatory dysfunction    zinc gluconate 50 mg tablet 1 tablet, oral, Daily        ROS:  A 14 point review of systems was done and is negative other than as stated in HPI    Vitals:      1/9/2024    10:06 AM 2/14/2024    10:49 AM 2/15/2024     9:04 AM 3/20/2024    12:26 PM 8/19/2024    10:35 AM 8/19/2024    11:58 AM 9/12/2024    10:53 AM   Vitals   Systolic 130 125 120 120 149 138 122   Diastolic 82 73 74 70 79 78 72   Heart Rate 90 99 84 87 93  84   Temp 36.3 °C (97.3 °F) 36.1 °C (97 °F)   36.4 °C (97.6 °F)     Resp 16 16   16     Height (in) 1.905 m (6' 3\") 1.905 m (6' 3\")  1.905 m (6' 3\") 1.905 m (6' 3\")  1.905 m (6' 3\")   Weight (lb) 272.9 270.4  266.8 262.8  265   BMI 34.11 kg/m2 33.8 kg/m2  33.35 kg/m2 32.85 kg/m2  33.12 kg/m2   BSA (m2) 2.56 m2 2.55 m2  2.53 m2 2.51 m2  2.52 m2        Physical Exam:   Constitutional: Cooperative, in no acute distress, alert, appears stated age.  Skin: Skin color, texture, turgor normal. No rashes or lesions.  Head: Normocephalic. No masses, lesions, tenderness or abnormalities  Eyes: Extraocular movements are grossly intact.  Mouth and throat: Mucous membranes moist  Neck: Neck supple, no carotid bruits, no JVD  Respiratory: Lungs clear to auscultation, no wheezing or rhonchi, no use of accessory muscles  Chest wall: No scars, normal excursion with respiration  Cardiovascular: Regular rhythm without murmur  Gastrointestinal: Abdomen soft, nontender. Bowel sounds normal. Obese.  Musculoskeletal: Strength equal in upper extremities  Extremities: No pitting edema  Neurologic: Sensation grossly intact, alert and oriented ×3         Intake/Output:   No intake/output data recorded.    Outpatient Medications  Current Outpatient Medications on File Prior to Visit   Medication Sig Dispense Refill    allopurinol (Zyloprim) 100 mg tablet TAKE 2 TABLETS BY MOUTH ONCE DAILY. 180 tablet 1    amLODIPine (Norvasc) 2.5 mg tablet TAKE 1 TABLET BY MOUTH EVERYDAY AT BEDTIME 90 " tablet 1    ascorbic acid (Vitamin C) 500 mg tablet Take by mouth.      aspirin 81 mg EC tablet Take by mouth.      carvedilol (Coreg) 25 mg tablet TAKE 1 TABLET BY MOUTH TWICE A DAY WITH MORNING AND EVENING MEAL 180 tablet 1    cloNIDine (Catapres) 0.1 mg tablet TAKE 1 TABLET BY MOUTH TWICE A  tablet 3    colchicine 0.6 mg tablet Take 1 tablet (0.6 mg) by mouth if needed for muscle/joint pain (gout). 60 tablet 0    colestipol (Colestid) 1 gram tablet TAKE 2 TABLETS BY MOUTH TWICE A  tablet 5    Eliquis 5 mg tablet TAKE 1 TABLET BY MOUTH TWICE A DAY 60 tablet 4    exenatide microspheres (Bydureon BCise) 2 mg/0.85 mL auto-injector Inject 2 mg under the skin 1 (one) time per week. 9 each 3    famotidine (Pepcid) 20 mg tablet TAKE 1 TABLET BY MOUTH EVERY DAY AS DIRECTED 90 tablet 1    ferrous sulfate 325 (65 Fe) MG tablet Take 1 tablet (325 mg) by mouth early in the morning..      fish oil concentrate (Omega-3) 120-180 mg capsule Take 1 capsule (1 g) by mouth once daily.      FreeStyle Kalpana 3 [place] misc Use as instructed 1 each 0    FreeStyle Kalpana 3 Sensor device Change sensor every 14 days as directed 2 each 11    furosemide (Lasix) 40 mg tablet TAKE 1 TABLET BY MOUTH EVERY DAY 90 tablet 3    gabapentin (Neurontin) 300 mg capsule TAKE 2 CAPSULES BY MOUTH EVERY DAY AT BEDTIME 180 capsule 3    glimepiride (Amaryl) 1 mg tablet Take 1 tablet (1 mg) by mouth 2 times a day. 180 tablet 1    glucosamine-chondroit-vit C-Mn 500-400 mg capsule Take by mouth.      hydroCHLOROthiazide (HYDRODiuril) 25 mg tablet TAKE 1 TABLET BY MOUTH EVERY DAY 90 tablet 0    Jardiance 25 mg TAKE 1 TABLET BY MOUTH EVERY DAY 90 tablet 1    lansoprazole (Prevacid) 15 mg DR capsule Take 1 capsule (15 mg) by mouth once daily.      losartan (Cozaar) 100 mg tablet TAKE 1 TABLET BY MOUTH EVERY DAY 90 tablet 3    metFORMIN (Glucophage) 1,000 mg tablet TAKE 1 TABLET EVERY 12 HOURS DAILY 180 tablet 0    multivitamin with folic acid (One  Daily Multivitamin) 400 mcg tablet Take 1 tablet by mouth once daily.      OneTouch Ultra Test strip USE AS DIRECTED 3 TIMES A  strip 11    OneTouch Ultra Test strip 1 each by Does not apply route 3 times a day. 100 each 3    pioglitazone (Actos) 45 mg tablet TAKE 1 TABLET (45 MG) BY MOUTH ONCE DAILY 90 tablet 1    rosuvastatin (Crestor) 20 mg tablet TAKE 1 TABLET BY MOUTH EVERY DAY 90 tablet 3    saw palmetto 450 mg capsule Take 1 capsule (450 mg) by mouth once daily.      triamcinolone (Kenalog) 0.1 % cream Apply topically 2 times a day.      turmeric-turmeric root extract 450-50 mg capsule Take 1 capsule by mouth once daily.      walker misc 1 each once daily. Patrick walker, he has ambulatory dysfunction 1 each 0    zinc gluconate 50 mg tablet Take 1 tablet (50 mg) by mouth once daily.      [DISCONTINUED] FreeStyle Kalpana 3 [place] misc Use as instructed (Patient not taking: Reported on 9/12/2024) 1 each 0    [DISCONTINUED] FreeStyle Kalpana 3 Sensor device Change sensor every 14 days as directed (Patient not taking: Reported on 9/12/2024) 2 each 6     No current facility-administered medications on file prior to visit.       Labs: (past 26 weeks)  Recent Results (from the past 4368 hour(s))   CBC and Auto Differential    Collection Time: 05/13/24  8:57 AM   Result Value Ref Range    WBC 5.6 4.4 - 11.3 x10*3/uL    nRBC 0.0 0.0 - 0.0 /100 WBCs    RBC 4.93 4.50 - 5.90 x10*6/uL    Hemoglobin 15.1 13.5 - 17.5 g/dL    Hematocrit 46.5 41.0 - 52.0 %    MCV 94 80 - 100 fL    MCH 30.6 26.0 - 34.0 pg    MCHC 32.5 32.0 - 36.0 g/dL    RDW 14.4 11.5 - 14.5 %    Platelets 214 150 - 450 x10*3/uL    Neutrophils % 61.0 40.0 - 80.0 %    Immature Granulocytes %, Automated 0.4 0.0 - 0.9 %    Lymphocytes % 22.9 13.0 - 44.0 %    Monocytes % 9.8 2.0 - 10.0 %    Eosinophils % 5.0 0.0 - 6.0 %    Basophils % 0.9 0.0 - 2.0 %    Neutrophils Absolute 3.45 1.60 - 5.50 x10*3/uL    Immature Granulocytes Absolute, Automated 0.02 0.00 - 0.50  x10*3/uL    Lymphocytes Absolute 1.29 0.80 - 3.00 x10*3/uL    Monocytes Absolute 0.55 0.05 - 0.80 x10*3/uL    Eosinophils Absolute 0.28 0.00 - 0.40 x10*3/uL    Basophils Absolute 0.05 0.00 - 0.10 x10*3/uL   Comprehensive Metabolic Panel    Collection Time: 05/13/24  8:57 AM   Result Value Ref Range    Glucose 193 (H) 74 - 99 mg/dL    Sodium 141 136 - 145 mmol/L    Potassium 4.1 3.5 - 5.3 mmol/L    Chloride 102 98 - 107 mmol/L    Bicarbonate 28 21 - 32 mmol/L    Anion Gap 15 10 - 20 mmol/L    Urea Nitrogen 30 (H) 6 - 23 mg/dL    Creatinine 1.34 (H) 0.50 - 1.30 mg/dL    eGFR 57 (L) >60 mL/min/1.73m*2    Calcium 10.9 (H) 8.6 - 10.3 mg/dL    Albumin 4.8 3.4 - 5.0 g/dL    Alkaline Phosphatase 48 33 - 136 U/L    Total Protein 7.3 6.4 - 8.2 g/dL    AST 43 (H) 9 - 39 U/L    Bilirubin, Total 0.5 0.0 - 1.2 mg/dL    ALT 51 10 - 52 U/L   Hemoglobin A1C    Collection Time: 05/13/24  8:57 AM   Result Value Ref Range    Hemoglobin A1C 7.4 (H) see below %    Estimated Average Glucose 166 Not Established mg/dL   Lipid Panel    Collection Time: 05/13/24  8:57 AM   Result Value Ref Range    Cholesterol 126 0 - 199 mg/dL    HDL-Cholesterol 34.2 mg/dL    Cholesterol/HDL Ratio 3.7     LDL Calculated 55 <=99 mg/dL    VLDL 36 0 - 40 mg/dL    Triglycerides 182 (H) 0 - 149 mg/dL    Non HDL Cholesterol 92 0 - 149 mg/dL   Albumin , Urine Random    Collection Time: 05/13/24  8:57 AM   Result Value Ref Range    Albumin, Urine Random 17.4 Not established mg/L    Creatinine, Urine Random 79.3 20.0 - 370.0 mg/dL    Albumin/Creatinine Ratio 21.9 <30.0 ug/mg Creat   TSH with reflex to Free T4 if abnormal    Collection Time: 05/13/24  8:57 AM   Result Value Ref Range    Thyroid Stimulating Hormone 2.76 0.44 - 3.98 mIU/L   Uric acid    Collection Time: 05/13/24  8:57 AM   Result Value Ref Range    Uric Acid 7.2 4.0 - 7.5 mg/dL       ECG  Encounter Date: 03/20/24   ECG 12 Lead    Narrative    Sinus rhythm, nonspecific T wave abnormality, heart rate 87  bpm.       Echocardiogram  No results found for this or any previous visit from the past 1095 days.      CV Studies:  EKG:  Encounter Date: 03/20/24   ECG 12 Lead    Narrative    Sinus rhythm, nonspecific T wave abnormality, heart rate 87 bpm.     Echocardiogram:   Echocardiogram     [place] [address]  Phone [phone] Fax [phone]    TRANSTHORACIC ECHOCARDIOGRAM REPORT      Patient Name:     MAHAMED Batres Physician:   [id number] Sol FELIX MD  Study Date:       8/23/2021      Referring Physician: [id number] TAY  MRN/PID:          [id number]       PCP:  Accession/Order#: [id number]   Department Location: [facility]  YOB: 1950       Fellow:  Gender:           M              Nurse:               Robert Lemos RN  Admit Date:                      Sonographer:         Rossana Kelly [facility]  Admission Status: Outpatient     Additional Staff:  Height:           190.50 cm      CC Report to:  Weight:           119.30 kg      Study Type:          Echocardiogram  BSA:              2.47 m2  Blood Pressure: 105 /68 mmHg    Diagnosis/ICD: I25.10-Atherosclerotic heart disease of native coronary artery  without angina pectoris  Indication:    Dyspnea on Exertion  Procedure/CPT: Echo Complete w Full [id number]    Patient History:  Pertinent History: Stent, HTN, CM, ASHD, DM.    Study Detail: The following Echo studies were performed: 2D, M-Mode, Doppler and  color flow. Technically challenging study due to body habitus.  Optison used as a contrast agent for endocardial border  definition. Total contrast used for this procedure was 5 mL via IV  push.      PHYSICIAN INTERPRETATION:  Left Ventricle: The left ventricular systolic function is normal, with an estimated ejection fraction of 60-65%. There are no regional wall motion abnormalities. The left ventricular cavity size is normal.  Spectral Doppler shows a normal pattern of left ventricular diastolic filling.  Left Atrium: The left atrium is normal in size.  Right Ventricle: The right ventricle is normal in size. There is low normal right ventricular systolic function.  Right Atrium: The right atrium is normal in size.  Aortic Valve: The aortic valve is probably trileaflet. There is no evidence of aortic valve regurgitation. The peak instantaneous gradient of the aortic valve is 6.4 mmHg. The mean gradient of the aortic valve is 3.0 mmHg.  Mitral Valve: The mitral valve is normal in structure. There is trace mitral valve regurgitation.  Tricuspid Valve: The tricuspid valve is structurally normal. No evidence of tricuspid regurgitation.  Pulmonic Valve: The pulmonic valve is structurally normal. There is physiologic pulmonic valve regurgitation.  Pericardium: There is no pericardial effusion noted.  Aorta: The aortic root is abnormal. There is moderate dilatation of the ascending aorta. Based of AD/height < 2.43 cm/m indicated lower risk of dissection.      CONCLUSIONS:  1. The left ventricular systolic function is normal with a 60-65% estimated ejection fraction.  2. There is moderate dilatation of the ascending aorta.    QUANTITATIVE DATA SUMMARY:  2D MEASUREMENTS:  Normal Ranges:  Ao Root d:     3.90 cm   (2.0-3.7cm)  LAs:           3.20 cm   (2.7-4.0cm)  IVSd:          1.10 cm   (0.6-1.1cm)  LVPWd:         1.10 cm   (0.6-1.1cm)  LVIDd:         4.70 cm   (3.9-5.9cm)  LVIDs:         3.40 cm  LV Mass Index: 76.1 g/m2  LV % FS        27.7 %    LA VOLUME:  Normal Ranges:  LA Vol A4C:        34.6 ml    (22+/-6mL/m2)  LA Vol A2C:        46.7 ml  LA Vol BP:         42.8 ml  LA Vol Index A4C:  14.0ml/m2  LA Vol Index A2C:  18.9 ml/m2  LA Vol Index BP:   17.4 ml/m2  LA Area A4C:       15.5 cm2  LA Area A2C:       16.9 cm2  LA Major Axis A4C: 5.9 cm  LA Major Axis A2C: 5.2 cm  LA Volume Index:   16.0 ml/m2  LA Vol A4C:        31.0 ml  LA Vol A2C:         44.0 ml    RA VOLUME BY A/L METHOD:  Normal Ranges:  RA Area A4C: 17.8 cm2    AORTA MEASUREMENTS:  Normal Ranges:  Ao Sinus, d: 3.90 cm (2.1-3.5cm)  Ao STJ, d:   3.00 cm (1.7-3.4cm)  Asc Ao, d:   4.30 cm (2.1-3.4cm)    LV SYSTOLIC FUNCTION BY 2D PLANIMETRY (MOD):  Normal Ranges:  EF-A4C View: 58.4 % (>55%)  EF-A2C View: 61.8 %  EF-Biplane:  59.9 %    LV DIASTOLIC FUNCTION:  Normal Ranges:  MV Peak E:        0.55 m/s    (0.7-1.2 m/s)  MV Peak A:        0.81 m/s    (0.42-0.7 m/s)  E/A Ratio:        0.67        (1.0-2.2)  MV e'             0.07 m/s    (>8.0)  MV lateral e'     0.08 m/s  MV medial e'      0.06 m/s  MV A Dur:         130.00 msec  E/e' Ratio:       7.83        (<8.0)  PulmV A Revs Dur: 141.00 msec    MITRAL VALVE:  Normal Ranges:  MV DT: 250 msec (150-240msec)    AORTIC VALVE:  Normal Ranges:  AoV Vmax:                1.26 m/s (<1.7m/s)  AoV Peak P.4 mmHg (<20mmHg)  AoV Mean PG:             3.0 mmHg (1.7-11.5mmHg)  LVOT Max Hair:            0.98 m/s (<1.1m/s)  AoV VTI:                 22.20 cm (18-25cm)  LVOT VTI:                16.70 cm  LVOT Diameter:           2.40 cm  (1.8-2.4cm)  AoV Area, VTI:           3.40 cm2 (2.5-5.5cm2)  AoV Area,Vmax:           3.51 cm2 (2.5-4.5cm2)  AoV Dimensionless Index: 0.75    RIGHT VENTRICLE:  RV 1   3.9 cm  RV 2   3.7 cm  RV 3   6.6 cm  TAPSE: 19.0 mm  RV s'  0.13 m/s    TRICUSPID VALVE/RVSP:  Normal Ranges:  Peak TR Velocity: 1.97 m/s  RV Syst Pressure: 20.5 mmHg (< 30mmHg)  TV E Vmax:        0.29 m/s  (0.3-0.7m/s)  TV A Vmax:        0.34 m/s  IVC Diam:         2.08 cm    Pulmonary Veins:  PulmV A Revs Dur: 141.00 msec      [id number] Sol Gutierrez MD  Electronically signed on 2021 at 5:11:07 PM         Final     Stress Testing IMGRESULT([id number]): No results found for this or any previous visit from the past  days.    Cardiac Catheterization: No results found for this or any previous visit from the past  days.  No results found for  this or any previous visit from the past 3650 days.     Cardiac Scoring: No results found for this or any previous visit from the past 1825 days.    AAA : No results found for this or any previous visit from the past 1825 days.    OTHER: No results found for this or any previous visit from the past 1825 days.    LAST IMAGING RESULTS  ECG 12 Lead  Sinus rhythm, nonspecific T wave abnormality, heart rate 87 bpm.    Problem List Items Addressed This Visit       Obstructive sleep apnea syndrome in adult    PAF (paroxysmal atrial fibrillation) (Multi)    Type 2 diabetes mellitus (Multi)    Obesity (BMI 30-39.9)    History of cardiomyopathy    Hyperlipidemia    ASHD (arteriosclerotic heart disease) - Primary    Benign essential hypertension    Stage 3a chronic kidney disease (Multi)    On apixaban therapy    Thoracic ascending aortic aneurysm (CMS-HCC)        Denzel Nunez DO, FACC, FACOI

## 2024-09-23 ENCOUNTER — APPOINTMENT (OUTPATIENT)
Dept: CARDIOLOGY | Facility: CLINIC | Age: 74
End: 2024-09-23
Payer: MEDICARE

## 2024-09-23 VITALS
HEART RATE: 90 BPM | SYSTOLIC BLOOD PRESSURE: 124 MMHG | WEIGHT: 261.8 LBS | DIASTOLIC BLOOD PRESSURE: 74 MMHG | HEIGHT: 75 IN | BODY MASS INDEX: 32.55 KG/M2

## 2024-09-23 DIAGNOSIS — Z86.79 HISTORY OF CARDIOMYOPATHY: ICD-10-CM

## 2024-09-23 DIAGNOSIS — N18.31 STAGE 3A CHRONIC KIDNEY DISEASE (MULTI): ICD-10-CM

## 2024-09-23 DIAGNOSIS — E66.9 OBESITY (BMI 30-39.9): ICD-10-CM

## 2024-09-23 DIAGNOSIS — I71.21 ANEURYSM OF ASCENDING AORTA WITHOUT RUPTURE (CMS-HCC): ICD-10-CM

## 2024-09-23 DIAGNOSIS — Z79.01 ON APIXABAN THERAPY: ICD-10-CM

## 2024-09-23 DIAGNOSIS — E78.00 PURE HYPERCHOLESTEROLEMIA: ICD-10-CM

## 2024-09-23 DIAGNOSIS — I25.10 ASHD (ARTERIOSCLEROTIC HEART DISEASE): Primary | ICD-10-CM

## 2024-09-23 DIAGNOSIS — I10 BENIGN ESSENTIAL HYPERTENSION: ICD-10-CM

## 2024-09-23 DIAGNOSIS — E11.00 TYPE 2 DIABETES MELLITUS WITH HYPEROSMOLARITY WITHOUT COMA, WITHOUT LONG-TERM CURRENT USE OF INSULIN (MULTI): ICD-10-CM

## 2024-09-23 DIAGNOSIS — G47.33 OBSTRUCTIVE SLEEP APNEA SYNDROME IN ADULT: ICD-10-CM

## 2024-09-23 DIAGNOSIS — I48.0 PAF (PAROXYSMAL ATRIAL FIBRILLATION) (MULTI): ICD-10-CM

## 2024-09-23 PROCEDURE — 1157F ADVNC CARE PLAN IN RCRD: CPT | Performed by: INTERNAL MEDICINE

## 2024-09-23 PROCEDURE — 3074F SYST BP LT 130 MM HG: CPT | Performed by: INTERNAL MEDICINE

## 2024-09-23 PROCEDURE — 3061F NEG MICROALBUMINURIA REV: CPT | Performed by: INTERNAL MEDICINE

## 2024-09-23 PROCEDURE — 99214 OFFICE O/P EST MOD 30 MIN: CPT | Performed by: INTERNAL MEDICINE

## 2024-09-23 PROCEDURE — 1159F MED LIST DOCD IN RCRD: CPT | Performed by: INTERNAL MEDICINE

## 2024-09-23 PROCEDURE — 3048F LDL-C <100 MG/DL: CPT | Performed by: INTERNAL MEDICINE

## 2024-09-23 PROCEDURE — 1036F TOBACCO NON-USER: CPT | Performed by: INTERNAL MEDICINE

## 2024-09-23 PROCEDURE — 93005 ELECTROCARDIOGRAM TRACING: CPT | Performed by: INTERNAL MEDICINE

## 2024-09-23 PROCEDURE — 4010F ACE/ARB THERAPY RXD/TAKEN: CPT | Performed by: INTERNAL MEDICINE

## 2024-09-23 PROCEDURE — 3051F HG A1C>EQUAL 7.0%<8.0%: CPT | Performed by: INTERNAL MEDICINE

## 2024-09-23 PROCEDURE — 93010 ELECTROCARDIOGRAM REPORT: CPT | Performed by: INTERNAL MEDICINE

## 2024-09-23 PROCEDURE — 3078F DIAST BP <80 MM HG: CPT | Performed by: INTERNAL MEDICINE

## 2024-09-23 PROCEDURE — 3008F BODY MASS INDEX DOCD: CPT | Performed by: INTERNAL MEDICINE

## 2024-09-23 PROCEDURE — 1123F ACP DISCUSS/DSCN MKR DOCD: CPT | Performed by: INTERNAL MEDICINE

## 2024-09-25 DIAGNOSIS — E11.49 TYPE 2 DIABETES MELLITUS WITH OTHER DIABETIC NEUROLOGICAL COMPLICATION: ICD-10-CM

## 2024-09-26 RX ORDER — GABAPENTIN 300 MG/1
600 CAPSULE ORAL NIGHTLY
Qty: 180 CAPSULE | Refills: 3 | Status: SHIPPED | OUTPATIENT
Start: 2024-09-26

## 2024-10-21 ENCOUNTER — HOSPITAL ENCOUNTER (OUTPATIENT)
Dept: CARDIOLOGY | Facility: HOSPITAL | Age: 74
Discharge: HOME | End: 2024-10-21
Payer: MEDICARE

## 2024-10-21 DIAGNOSIS — E66.9 OBESITY (BMI 30-39.9): ICD-10-CM

## 2024-10-21 DIAGNOSIS — E11.00 TYPE 2 DIABETES MELLITUS WITH HYPEROSMOLARITY WITHOUT COMA, WITHOUT LONG-TERM CURRENT USE OF INSULIN (MULTI): ICD-10-CM

## 2024-10-21 DIAGNOSIS — E78.00 PURE HYPERCHOLESTEROLEMIA: ICD-10-CM

## 2024-10-21 DIAGNOSIS — I25.10 ASHD (ARTERIOSCLEROTIC HEART DISEASE): ICD-10-CM

## 2024-10-21 DIAGNOSIS — Z79.01 ON APIXABAN THERAPY: ICD-10-CM

## 2024-10-21 DIAGNOSIS — I48.0 PAF (PAROXYSMAL ATRIAL FIBRILLATION) (MULTI): ICD-10-CM

## 2024-10-21 DIAGNOSIS — I10 BENIGN ESSENTIAL HYPERTENSION: ICD-10-CM

## 2024-10-21 DIAGNOSIS — G47.33 OBSTRUCTIVE SLEEP APNEA SYNDROME IN ADULT: ICD-10-CM

## 2024-10-21 DIAGNOSIS — Z86.79 HISTORY OF CARDIOMYOPATHY: ICD-10-CM

## 2024-10-21 DIAGNOSIS — I71.21 ANEURYSM OF ASCENDING AORTA WITHOUT RUPTURE (CMS-HCC): ICD-10-CM

## 2024-10-21 DIAGNOSIS — N18.31 STAGE 3A CHRONIC KIDNEY DISEASE (MULTI): ICD-10-CM

## 2024-10-21 PROCEDURE — 2500000004 HC RX 250 GENERAL PHARMACY W/ HCPCS (ALT 636 FOR OP/ED): Performed by: INTERNAL MEDICINE

## 2024-10-21 PROCEDURE — 93306 TTE W/DOPPLER COMPLETE: CPT | Performed by: INTERNAL MEDICINE

## 2024-10-21 PROCEDURE — 93306 TTE W/DOPPLER COMPLETE: CPT

## 2024-10-22 ENCOUNTER — TELEPHONE (OUTPATIENT)
Dept: CARDIOLOGY | Facility: HOSPITAL | Age: 74
End: 2024-10-22
Payer: MEDICARE

## 2024-10-22 LAB
AORTIC VALVE MEAN GRADIENT: 2.6 MMHG
AORTIC VALVE PEAK VELOCITY: 1.04 M/S
AV PEAK GRADIENT: 4.3 MMHG
AVA (PEAK VEL): 3.23 CM2
AVA (VTI): 3.2 CM2
EJECTION FRACTION APICAL 4 CHAMBER: 57.7
EJECTION FRACTION: 58 %
LEFT ATRIUM VOLUME AREA LENGTH INDEX BSA: 19.6 ML/M2
LEFT VENTRICULAR OUTFLOW TRACT DIAMETER: 2.35 CM
LV EJECTION FRACTION BIPLANE: 58 %
MITRAL VALVE E/A RATIO: 0.79
MITRAL VALVE E/E' RATIO: 7.39
RIGHT VENTRICLE FREE WALL PEAK S': 10.45 CM/S
TRICUSPID ANNULAR PLANE SYSTOLIC EXCURSION: 2.1 CM

## 2024-10-22 NOTE — TELEPHONE ENCOUNTER
10/22/24  1359  Called results to patient with patient verbalizing understanding.      ----- Message from Denzel Nunez sent at 10/22/2024 12:47 PM EDT -----  Normal left ventricular systolic function with an ejection fraction of 58%, grade 1 diastolic dysfunction, septal LVH, low normal right ventricular systolic function, dilated aortic root measured at 4.3 cm.

## 2024-10-23 ENCOUNTER — LAB (OUTPATIENT)
Dept: LAB | Facility: LAB | Age: 74
End: 2024-10-23
Payer: MEDICARE

## 2024-10-23 DIAGNOSIS — G47.33 OBSTRUCTIVE SLEEP APNEA SYNDROME IN ADULT: ICD-10-CM

## 2024-10-23 DIAGNOSIS — I10 BENIGN ESSENTIAL HYPERTENSION: ICD-10-CM

## 2024-10-23 DIAGNOSIS — E11.00 TYPE 2 DIABETES MELLITUS WITH HYPEROSMOLARITY WITHOUT COMA, WITHOUT LONG-TERM CURRENT USE OF INSULIN (MULTI): ICD-10-CM

## 2024-10-23 DIAGNOSIS — Z00.00 ROUTINE ADULT HEALTH MAINTENANCE: ICD-10-CM

## 2024-10-23 DIAGNOSIS — E79.0 HYPERURICEMIA: ICD-10-CM

## 2024-10-23 DIAGNOSIS — Z86.79 HISTORY OF CARDIOMYOPATHY: ICD-10-CM

## 2024-10-23 DIAGNOSIS — E11.65 POORLY CONTROLLED TYPE 2 DIABETES MELLITUS (MULTI): ICD-10-CM

## 2024-10-23 DIAGNOSIS — N18.31 STAGE 3A CHRONIC KIDNEY DISEASE (MULTI): ICD-10-CM

## 2024-10-23 DIAGNOSIS — Z79.01 ON APIXABAN THERAPY: ICD-10-CM

## 2024-10-23 DIAGNOSIS — I25.10 ASHD (ARTERIOSCLEROTIC HEART DISEASE): ICD-10-CM

## 2024-10-23 DIAGNOSIS — Z00.00 ROUTINE GENERAL MEDICAL EXAMINATION AT HEALTH CARE FACILITY: ICD-10-CM

## 2024-10-23 DIAGNOSIS — I48.0 PAF (PAROXYSMAL ATRIAL FIBRILLATION) (MULTI): ICD-10-CM

## 2024-10-23 DIAGNOSIS — E66.9 OBESITY (BMI 30-39.9): ICD-10-CM

## 2024-10-23 DIAGNOSIS — I71.21 ANEURYSM OF ASCENDING AORTA WITHOUT RUPTURE (CMS-HCC): ICD-10-CM

## 2024-10-23 DIAGNOSIS — E78.00 PURE HYPERCHOLESTEROLEMIA: ICD-10-CM

## 2024-10-23 DIAGNOSIS — E83.52 HYPERCALCEMIA: ICD-10-CM

## 2024-10-23 DIAGNOSIS — R26.89 FUNCTIONAL GAIT ABNORMALITY: Chronic | ICD-10-CM

## 2024-10-23 LAB
ALBUMIN SERPL BCP-MCNC: 4.4 G/DL (ref 3.4–5)
ALP SERPL-CCNC: 52 U/L (ref 33–136)
ALT SERPL W P-5'-P-CCNC: 42 U/L (ref 10–52)
ANION GAP SERPL CALC-SCNC: 15 MMOL/L (ref 10–20)
AST SERPL W P-5'-P-CCNC: 32 U/L (ref 9–39)
BASOPHILS # BLD AUTO: 0.05 X10*3/UL (ref 0–0.1)
BASOPHILS NFR BLD AUTO: 0.8 %
BILIRUB SERPL-MCNC: 0.5 MG/DL (ref 0–1.2)
BUN SERPL-MCNC: 26 MG/DL (ref 6–23)
CALCIUM SERPL-MCNC: 10.3 MG/DL (ref 8.6–10.3)
CHLORIDE SERPL-SCNC: 103 MMOL/L (ref 98–107)
CHOLEST SERPL-MCNC: 132 MG/DL (ref 0–199)
CHOLESTEROL/HDL RATIO: 3.7
CO2 SERPL-SCNC: 27 MMOL/L (ref 21–32)
CREAT SERPL-MCNC: 1.34 MG/DL (ref 0.5–1.3)
CREAT UR-MCNC: 87 MG/DL (ref 20–370)
EGFRCR SERPLBLD CKD-EPI 2021: 56 ML/MIN/1.73M*2
EOSINOPHIL # BLD AUTO: 0.38 X10*3/UL (ref 0–0.4)
EOSINOPHIL NFR BLD AUTO: 6 %
ERYTHROCYTE [DISTWIDTH] IN BLOOD BY AUTOMATED COUNT: 14.6 % (ref 11.5–14.5)
EST. AVERAGE GLUCOSE BLD GHB EST-MCNC: 171 MG/DL
GLUCOSE SERPL-MCNC: 174 MG/DL (ref 74–99)
HBA1C MFR BLD: 7.6 %
HCT VFR BLD AUTO: 41.8 % (ref 41–52)
HDLC SERPL-MCNC: 35.5 MG/DL
HGB BLD-MCNC: 13.6 G/DL (ref 13.5–17.5)
IMM GRANULOCYTES # BLD AUTO: 0.04 X10*3/UL (ref 0–0.5)
IMM GRANULOCYTES NFR BLD AUTO: 0.6 % (ref 0–0.9)
IRON SATN MFR SERPL: 14 % (ref 25–45)
IRON SERPL-MCNC: 64 UG/DL (ref 35–150)
LDLC SERPL CALC-MCNC: 62 MG/DL
LYMPHOCYTES # BLD AUTO: 1.67 X10*3/UL (ref 0.8–3)
LYMPHOCYTES NFR BLD AUTO: 26.2 %
MAGNESIUM SERPL-MCNC: 1.76 MG/DL (ref 1.6–2.4)
MCH RBC QN AUTO: 30.4 PG (ref 26–34)
MCHC RBC AUTO-ENTMCNC: 32.5 G/DL (ref 32–36)
MCV RBC AUTO: 93 FL (ref 80–100)
MICROALBUMIN UR-MCNC: 15.7 MG/L
MICROALBUMIN/CREAT UR: 18 UG/MG CREAT
MONOCYTES # BLD AUTO: 0.67 X10*3/UL (ref 0.05–0.8)
MONOCYTES NFR BLD AUTO: 10.5 %
NEUTROPHILS # BLD AUTO: 3.57 X10*3/UL (ref 1.6–5.5)
NEUTROPHILS NFR BLD AUTO: 55.9 %
NON HDL CHOLESTEROL: 97 MG/DL (ref 0–149)
NRBC BLD-RTO: 0 /100 WBCS (ref 0–0)
PLATELET # BLD AUTO: 191 X10*3/UL (ref 150–450)
POTASSIUM SERPL-SCNC: 3.9 MMOL/L (ref 3.5–5.3)
PROT SERPL-MCNC: 6.9 G/DL (ref 6.4–8.2)
RBC # BLD AUTO: 4.48 X10*6/UL (ref 4.5–5.9)
SODIUM SERPL-SCNC: 141 MMOL/L (ref 136–145)
TIBC SERPL-MCNC: 446 UG/DL (ref 240–445)
TRIGL SERPL-MCNC: 171 MG/DL (ref 0–149)
UIBC SERPL-MCNC: 382 UG/DL (ref 110–370)
URATE SERPL-MCNC: 6.5 MG/DL (ref 4–7.5)
VLDL: 34 MG/DL (ref 0–40)
WBC # BLD AUTO: 6.4 X10*3/UL (ref 4.4–11.3)

## 2024-10-23 PROCEDURE — 82570 ASSAY OF URINE CREATININE: CPT

## 2024-10-23 PROCEDURE — 82043 UR ALBUMIN QUANTITATIVE: CPT

## 2024-10-23 PROCEDURE — 83550 IRON BINDING TEST: CPT

## 2024-10-23 PROCEDURE — 83735 ASSAY OF MAGNESIUM: CPT

## 2024-10-23 PROCEDURE — 85025 COMPLETE CBC W/AUTO DIFF WBC: CPT

## 2024-10-23 PROCEDURE — 83540 ASSAY OF IRON: CPT

## 2024-10-23 PROCEDURE — 84550 ASSAY OF BLOOD/URIC ACID: CPT

## 2024-10-23 PROCEDURE — 83036 HEMOGLOBIN GLYCOSYLATED A1C: CPT

## 2024-10-23 PROCEDURE — 80061 LIPID PANEL: CPT

## 2024-10-23 PROCEDURE — 36415 COLL VENOUS BLD VENIPUNCTURE: CPT

## 2024-10-23 PROCEDURE — 80053 COMPREHEN METABOLIC PANEL: CPT

## 2024-10-25 DIAGNOSIS — I50.42 CHRONIC COMBINED SYSTOLIC (CONGESTIVE) AND DIASTOLIC (CONGESTIVE) HEART FAILURE: ICD-10-CM

## 2024-10-28 RX ORDER — FUROSEMIDE 40 MG/1
40 TABLET ORAL DAILY
Qty: 90 TABLET | Refills: 3 | Status: SHIPPED | OUTPATIENT
Start: 2024-10-28

## 2024-10-28 RX ORDER — GLUCOSAMINE/CHONDR SU A SOD 250-200 MG
TABLET ORAL
COMMUNITY

## 2024-11-16 DIAGNOSIS — E11.49 TYPE 2 DIABETES MELLITUS WITH OTHER DIABETIC NEUROLOGICAL COMPLICATION: ICD-10-CM

## 2024-11-16 DIAGNOSIS — E11.42 TYPE 2 DIABETES MELLITUS WITH DIABETIC POLYNEUROPATHY, WITHOUT LONG-TERM CURRENT USE OF INSULIN: ICD-10-CM

## 2024-11-16 DIAGNOSIS — E66.9 OBESITY, UNSPECIFIED: ICD-10-CM

## 2024-11-18 RX ORDER — METFORMIN HYDROCHLORIDE 1000 MG/1
TABLET ORAL
Qty: 180 TABLET | Refills: 0 | Status: SHIPPED | OUTPATIENT
Start: 2024-11-18

## 2024-11-18 RX ORDER — FAMOTIDINE 20 MG/1
TABLET, FILM COATED ORAL
Qty: 90 TABLET | Refills: 1 | Status: SHIPPED | OUTPATIENT
Start: 2024-11-18

## 2024-11-18 RX ORDER — PIOGLITAZONEHYDROCHLORIDE 45 MG/1
45 TABLET ORAL DAILY
Qty: 90 TABLET | Refills: 1 | Status: SHIPPED | OUTPATIENT
Start: 2024-11-18 | End: 2025-05-17

## 2024-11-20 DIAGNOSIS — E11.65 POORLY CONTROLLED TYPE 2 DIABETES MELLITUS (MULTI): Primary | ICD-10-CM

## 2024-11-20 RX ORDER — HYDROCHLOROTHIAZIDE 12.5 MG/1
CAPSULE ORAL
Qty: 2 EACH | Refills: 11 | Status: SHIPPED | OUTPATIENT
Start: 2024-11-20

## 2024-11-20 NOTE — TELEPHONE ENCOUNTER
Patient has been unable to obtain april 3 due to back order. Please send april 3 plus to [facility]; pended for approval.

## 2024-11-27 ENCOUNTER — TELEPHONE (OUTPATIENT)
Dept: PRIMARY CARE | Facility: CLINIC | Age: 74
End: 2024-11-27
Payer: MEDICARE

## 2024-11-29 DIAGNOSIS — Z86.79 PERSONAL HISTORY OF OTHER DISEASES OF THE CIRCULATORY SYSTEM: ICD-10-CM

## 2024-11-29 RX ORDER — HYDROCHLOROTHIAZIDE 25 MG/1
TABLET ORAL
Qty: 90 TABLET | Refills: 0 | Status: SHIPPED | OUTPATIENT
Start: 2024-11-29

## 2024-12-03 DIAGNOSIS — E78.5 HYPERLIPIDEMIA, UNSPECIFIED: ICD-10-CM

## 2024-12-03 RX ORDER — ROSUVASTATIN CALCIUM 20 MG/1
20 TABLET, COATED ORAL DAILY
Qty: 90 TABLET | Refills: 3 | Status: SHIPPED | OUTPATIENT
Start: 2024-12-03

## 2025-01-02 ENCOUNTER — APPOINTMENT (OUTPATIENT)
Dept: PRIMARY CARE | Facility: CLINIC | Age: 75
End: 2025-01-02
Payer: MEDICARE

## 2025-01-02 VITALS
TEMPERATURE: 97.5 F | HEART RATE: 82 BPM | RESPIRATION RATE: 16 BRPM | SYSTOLIC BLOOD PRESSURE: 97 MMHG | BODY MASS INDEX: 33.01 KG/M2 | DIASTOLIC BLOOD PRESSURE: 63 MMHG | HEIGHT: 75 IN | OXYGEN SATURATION: 97 % | WEIGHT: 265.5 LBS

## 2025-01-02 DIAGNOSIS — N18.31 STAGE 3A CHRONIC KIDNEY DISEASE (MULTI): ICD-10-CM

## 2025-01-02 DIAGNOSIS — I10 BENIGN ESSENTIAL HYPERTENSION: ICD-10-CM

## 2025-01-02 DIAGNOSIS — I50.42 CHRONIC COMBINED SYSTOLIC AND DIASTOLIC CHF (CONGESTIVE HEART FAILURE): ICD-10-CM

## 2025-01-02 DIAGNOSIS — E61.1 IRON DEFICIENCY: ICD-10-CM

## 2025-01-02 DIAGNOSIS — M54.50 LOW BACK PAIN, UNSPECIFIED BACK PAIN LATERALITY, UNSPECIFIED CHRONICITY, UNSPECIFIED WHETHER SCIATICA PRESENT: ICD-10-CM

## 2025-01-02 DIAGNOSIS — I25.10 ASHD (ARTERIOSCLEROTIC HEART DISEASE): ICD-10-CM

## 2025-01-02 DIAGNOSIS — Z00.00 ROUTINE GENERAL MEDICAL EXAMINATION AT HEALTH CARE FACILITY: Primary | ICD-10-CM

## 2025-01-02 DIAGNOSIS — E79.0 HYPERURICEMIA: ICD-10-CM

## 2025-01-02 DIAGNOSIS — E78.5 HYPERLIPIDEMIA, UNSPECIFIED HYPERLIPIDEMIA TYPE: ICD-10-CM

## 2025-01-02 DIAGNOSIS — I48.0 PAROXYSMAL ATRIAL FIBRILLATION (MULTI): ICD-10-CM

## 2025-01-02 DIAGNOSIS — E11.40 CONTROLLED TYPE 2 DIABETES MELLITUS WITH DIABETIC NEUROPATHY, WITHOUT LONG-TERM CURRENT USE OF INSULIN: ICD-10-CM

## 2025-01-02 PROCEDURE — 1036F TOBACCO NON-USER: CPT | Performed by: INTERNAL MEDICINE

## 2025-01-02 PROCEDURE — 3078F DIAST BP <80 MM HG: CPT | Performed by: INTERNAL MEDICINE

## 2025-01-02 PROCEDURE — G0439 PPPS, SUBSEQ VISIT: HCPCS | Performed by: INTERNAL MEDICINE

## 2025-01-02 PROCEDURE — 1123F ACP DISCUSS/DSCN MKR DOCD: CPT | Performed by: INTERNAL MEDICINE

## 2025-01-02 PROCEDURE — 99214 OFFICE O/P EST MOD 30 MIN: CPT | Performed by: INTERNAL MEDICINE

## 2025-01-02 PROCEDURE — 4010F ACE/ARB THERAPY RXD/TAKEN: CPT | Performed by: INTERNAL MEDICINE

## 2025-01-02 PROCEDURE — 1170F FXNL STATUS ASSESSED: CPT | Performed by: INTERNAL MEDICINE

## 2025-01-02 PROCEDURE — 1157F ADVNC CARE PLAN IN RCRD: CPT | Performed by: INTERNAL MEDICINE

## 2025-01-02 PROCEDURE — 3074F SYST BP LT 130 MM HG: CPT | Performed by: INTERNAL MEDICINE

## 2025-01-02 PROCEDURE — 1160F RVW MEDS BY RX/DR IN RCRD: CPT | Performed by: INTERNAL MEDICINE

## 2025-01-02 PROCEDURE — 3008F BODY MASS INDEX DOCD: CPT | Performed by: INTERNAL MEDICINE

## 2025-01-02 PROCEDURE — 1159F MED LIST DOCD IN RCRD: CPT | Performed by: INTERNAL MEDICINE

## 2025-01-02 RX ORDER — FERROUS GLUCONATE 325 MG
38 TABLET ORAL
Qty: 90 TABLET | Refills: 1 | Status: SHIPPED | OUTPATIENT
Start: 2025-01-02 | End: 2026-01-02

## 2025-01-02 SDOH — ECONOMIC STABILITY: FOOD INSECURITY: WITHIN THE PAST 12 MONTHS, THE FOOD YOU BOUGHT JUST DIDN'T LAST AND YOU DIDN'T HAVE MONEY TO GET MORE.: NEVER TRUE

## 2025-01-02 SDOH — ECONOMIC STABILITY: FOOD INSECURITY: WITHIN THE PAST 12 MONTHS, YOU WORRIED THAT YOUR FOOD WOULD RUN OUT BEFORE YOU GOT MONEY TO BUY MORE.: NEVER TRUE

## 2025-01-02 ASSESSMENT — ENCOUNTER SYMPTOMS
LOSS OF SENSATION IN FEET: 1
DEPRESSION: 0
OCCASIONAL FEELINGS OF UNSTEADINESS: 1

## 2025-01-02 ASSESSMENT — LIFESTYLE VARIABLES
HOW OFTEN DO YOU HAVE A DRINK CONTAINING ALCOHOL: NEVER
HOW MANY STANDARD DRINKS CONTAINING ALCOHOL DO YOU HAVE ON A TYPICAL DAY: PATIENT DOES NOT DRINK

## 2025-01-02 ASSESSMENT — ACTIVITIES OF DAILY LIVING (ADL)
GROCERY_SHOPPING: INDEPENDENT
MANAGING_FINANCES: INDEPENDENT
BATHING: INDEPENDENT
DOING_HOUSEWORK: INDEPENDENT
TAKING_MEDICATION: INDEPENDENT
DRESSING: INDEPENDENT

## 2025-01-02 ASSESSMENT — PATIENT HEALTH QUESTIONNAIRE - PHQ9
SUM OF ALL RESPONSES TO PHQ9 QUESTIONS 1 & 2: 0
1. LITTLE INTEREST OR PLEASURE IN DOING THINGS: NOT AT ALL
2. FEELING DOWN, DEPRESSED OR HOPELESS: NOT AT ALL

## 2025-01-02 NOTE — PROGRESS NOTES
Subjective   Reason for Visit: Isauro Valdez is an 74 y.o. male here for a Medicare Wellness visit.     Past Medical, Surgical, and Family History reviewed and updated in chart.    Reviewed all medications by prescribing practitioner or clinical pharmacist (such as prescriptions, OTCs, herbal therapies and supplements) and documented in the medical record.    HPI    Follow up and Medicare wellness exam:      He appears to be due for lab work soon:      DM type 2: Patient takes Bydureon, Actos, metformin, Jardiance., glimepiride now, glucoses have improved , he has seen endocrinology also (Dr Shah). Glimepiride dose was increased by Dr Harry. Labs were completed in 5/2024 , patient admits that he does not walk, he uses a cane now. Wife has been encouraging the patient to walk, he has a gym membership now, he has not been very active. Wife has not been well, she has not been pushing the patient        CAD/ CHF/ HTN: he takes carvedilol, clonidine, HCTZ, Losartan, furosemide, and amlodipine. Patient sees Dr Nunez also (cardiology). BP is low today. Patient will follow up with Dr Nunez in 3/2024     Atrial fib: patient takes Eliquis twice daily, he does go through assistance program for the med, patient has not been able to talk to clinical pharmacy since he does not have internet access.      hyperlipidemia: he takes rosuvastatin and colestipol      back pain: patient takes gabapentin at bedtime, he takes 600 mg at bedtime, it helps with restless leg issues also per recommendation of Dr Angel (sleep medicine), he no longer takes iron. he does have an iron deficiency.   Patient continues to have back and right hip pain. He requests an evaluation by back specialist     Colon polyps: he had a follow up colonoscopy completed     BPH/ spot on the kidney: he takes saw michelle, he saw Dr Thompson.  Patient was advised by Dr Thompson that he did not require a urology follow up now.      Gout: he now takes allopurinol, 200  "mg daily, he uses colchicine as needed      Patient Care Team:  Javier Hong MD as PCP - General (Internal Medicine)  Javier Hong MD as PCP - [employer] PCP     Review of Systems    otherwise negative aside from what was mentioned above in HPI.     Objective   Vitals:  BP 97/63 (BP Location: Left arm, Patient Position: Sitting, BP Cuff Size: Adult)   Pulse 82   Temp 36.4 °C (97.5 °F) (Temporal)   Resp 16   Ht 1.905 m (6' 3\")   Wt 120 kg (265 lb 8 oz)   SpO2 97%   BMI 33.19 kg/m²       Physical Exam    Gen: Alert, NAD, he is obese, accompanied by wife, he ambulates with a cane  HEENT:  EOMI, conjunctiva and sclera normal in appearance, wearing glasses, no thyromegaly or neck masses  Respiratory:  Lungs CTAB  Cardiovascular:  Heart: regular  rate today and rhythm noted today . No M/R/G, no carotid bruits, no peripheral edema noted  Abdominal obesity is noted  Neuro:  Gross motor and sensory intact  Skin:  No suspicious lesions present    Assessment & Plan  Routine general medical examination at health care facility  Patient completed medicare wellness exam today. Follow up in 4 months  Orders:    1 Year Follow Up In Primary Care - Wellness Exam; Future    Referral to Clinical Pharmacy; Future    CBC and Auto Differential; Future    Comprehensive Metabolic Panel; Future    ASHD (arteriosclerotic heart disease)  Follow up with cardiology  Orders:    Referral to Clinical Pharmacy; Future    CBC and Auto Differential; Future    Comprehensive Metabolic Panel; Future    Benign essential hypertension  BP is low today, patient takes multiple meds at present  Orders:    Referral to Clinical Pharmacy; Future    CBC and Auto Differential; Future    Comprehensive Metabolic Panel; Future    TSH with reflex to Free T4 if abnormal; Future    Hyperlipidemia, unspecified hyperlipidemia type  TG level is slightly elevated, likely due to glucose control   Orders:    Referral to Clinical Pharmacy; " Future    CBC and Auto Differential; Future    Comprehensive Metabolic Panel; Future    Lipid Panel; Future    Stage 3a chronic kidney disease (Multi)  Renal function is stable.  Orders:    Referral to Clinical Pharmacy; Future    Albumin-Creatinine Ratio, Urine Random; Future    CBC and Auto Differential; Future    Comprehensive Metabolic Panel; Future    Controlled type 2 diabetes mellitus with diabetic neuropathy, without long-term current use of insulin  Marginal control. PATIENT admits that he eats sweets, and carbohydrates. Clinical pharmacy referral again completed. He does use CGM now  Orders:    Referral to Clinical Pharmacy; Future    Albumin-Creatinine Ratio, Urine Random; Future    CBC and Auto Differential; Future    Comprehensive Metabolic Panel; Future    Hemoglobin A1C; Future    Chronic combined systolic and diastolic CHF (congestive heart failure)  Continue to see cardiology.   Orders:    Referral to Clinical Pharmacy; Future    CBC and Auto Differential; Future    Comprehensive Metabolic Panel; Future    Paroxysmal atrial fibrillation (Multi)  Continue Eliquis therapy  Orders:    Referral to Clinical Pharmacy; Future    CBC and Auto Differential; Future    Comprehensive Metabolic Panel; Future    TSH with reflex to Free T4 if abnormal; Future    Hyperuricemia  Continue current med therapy    Orders:    CBC and Auto Differential; Future    Comprehensive Metabolic Panel; Future    Low back pain, unspecified back pain laterality, unspecified chronicity, unspecified whether sciatica present  Patient had  back surgery in the past, check an x ray of the LS spine. Refer to physical medicine for assessment    Orders:    XR lumbar spine complete 4+ views; Future    Referral to Physical Medicine Rehab; Future    CBC and Auto Differential; Future    Comprehensive Metabolic Panel; Future    Iron deficiency  He gets restless legs, take iron daily  Orders:    ferrous gluconate (Fergon) 324 (38 Fe) mg tablet;  Take 1 tablet (38 mg of iron) by mouth once daily with breakfast.    Iron and TIBC; Future     Medicare wellness exam completed today

## 2025-01-02 NOTE — ASSESSMENT & PLAN NOTE
Follow up with cardiology  Orders:    Referral to Clinical Pharmacy; Future    CBC and Auto Differential; Future    Comprehensive Metabolic Panel; Future

## 2025-01-02 NOTE — ASSESSMENT & PLAN NOTE
Marginal control. PATIENT admits that he eats sweets, and carbohydrates. Clinical pharmacy referral again completed. He does use CGM now  Orders:    Referral to Clinical Pharmacy; Future    Albumin-Creatinine Ratio, Urine Random; Future    CBC and Auto Differential; Future    Comprehensive Metabolic Panel; Future    Hemoglobin A1C; Future

## 2025-01-02 NOTE — ASSESSMENT & PLAN NOTE
Patient completed medicare wellness exam today. Follow up in 4 months  Orders:    1 Year Follow Up In Primary Care - Wellness Exam; Future    Referral to Clinical Pharmacy; Future    CBC and Auto Differential; Future    Comprehensive Metabolic Panel; Future

## 2025-01-02 NOTE — ASSESSMENT & PLAN NOTE
Continue current med therapy    Orders:    CBC and Auto Differential; Future    Comprehensive Metabolic Panel; Future

## 2025-01-02 NOTE — ASSESSMENT & PLAN NOTE
Renal function is stable.  Orders:    Referral to Clinical Pharmacy; Future    Albumin-Creatinine Ratio, Urine Random; Future    CBC and Auto Differential; Future    Comprehensive Metabolic Panel; Future

## 2025-01-02 NOTE — ASSESSMENT & PLAN NOTE
TG level is slightly elevated, likely due to glucose control   Orders:    Referral to Clinical Pharmacy; Future    CBC and Auto Differential; Future    Comprehensive Metabolic Panel; Future    Lipid Panel; Future

## 2025-01-02 NOTE — ASSESSMENT & PLAN NOTE
BP is low today, patient takes multiple meds at present  Orders:    Referral to Clinical Pharmacy; Future    CBC and Auto Differential; Future    Comprehensive Metabolic Panel; Future    TSH with reflex to Free T4 if abnormal; Future

## 2025-01-02 NOTE — ASSESSMENT & PLAN NOTE
Patient had  back surgery in the past, check an x ray of the LS spine. Refer to physical medicine for assessment    Orders:    XR lumbar spine complete 4+ views; Future    Referral to Physical Medicine Rehab; Future    CBC and Auto Differential; Future    Comprehensive Metabolic Panel; Future

## 2025-01-02 NOTE — ASSESSMENT & PLAN NOTE
Continue to see cardiology.   Orders:    Referral to Clinical Pharmacy; Future    CBC and Auto Differential; Future    Comprehensive Metabolic Panel; Future

## 2025-01-06 ENCOUNTER — HOSPITAL ENCOUNTER (OUTPATIENT)
Dept: RADIOLOGY | Facility: CLINIC | Age: 75
Discharge: HOME | End: 2025-01-06
Payer: MEDICARE

## 2025-01-06 DIAGNOSIS — M54.50 LOW BACK PAIN, UNSPECIFIED BACK PAIN LATERALITY, UNSPECIFIED CHRONICITY, UNSPECIFIED WHETHER SCIATICA PRESENT: ICD-10-CM

## 2025-01-06 PROCEDURE — 72110 X-RAY EXAM L-2 SPINE 4/>VWS: CPT | Performed by: RADIOLOGY

## 2025-01-06 PROCEDURE — 72110 X-RAY EXAM L-2 SPINE 4/>VWS: CPT

## 2025-01-08 DIAGNOSIS — I10 ESSENTIAL (PRIMARY) HYPERTENSION: ICD-10-CM

## 2025-01-09 RX ORDER — CARVEDILOL 25 MG/1
TABLET ORAL
Qty: 180 TABLET | Refills: 1 | Status: SHIPPED | OUTPATIENT
Start: 2025-01-09

## 2025-01-10 ENCOUNTER — APPOINTMENT (OUTPATIENT)
Dept: PRIMARY CARE | Facility: CLINIC | Age: 75
End: 2025-01-10
Payer: MEDICARE

## 2025-01-14 NOTE — PROGRESS NOTES
Pharmacist Clinic: Diabetes Management  Isauro Valdez is a 74 y.o. male was referred to Clinical Pharmacy Team for diabetes management.   Referring Provider: Javier Hong, *  - Last visit with referring provider: 1/2/25    Subjective     HPI    Current Diabetes Pharmacotherapy:    - Bydureon (Exenatide) 2 mg once weekly - Saturdays  - Jardiance 25 mg daily  - Metformin 1,000 mg BID  - Glimepiride 1 mg BID    - Sometimes takes without food bc forgets (educated)  - Pioglitazone 45 mg daily    Social History:  Current diet:   - B: english muffin  - L: soup; left overs  - D: chicken; asparagus; squash  - Trying to switch to mediterranean diet  - Seeing a nutritionist   - Cookies throughout the day  - Sugar free thin mint before bed  - Half a pb sandwich before bed   - Denies pop   - Drinks dark cherry juice for gout sometimes     Current exercise:   - None    Current monitoring regimen:   Patient is using: continuous glucose monitor  Type of CGM: Kalpana 3 plus     Reported blood sugars:     30 day report:  - Above: 69%  - Target: 31%  - Below: 0%    Any episodes of hypoglycemia? No    Adverse Effects: Some smell with urination; None    Objective     There were no vitals taken for this visit.    Allergies   Allergen Reactions    Adhesive Tape-Silicones Unknown    Niacin Hives and Unknown    Nystatin Other    Penicillins Hives and Unknown       Historical Diabetes Pharmacotherapy:  None    SECONDARY PREVENTION  - Statin? Yes   - ACE-I/ARB? Yes  - Aspirin? Yes    Pertinent PMH Review:  - PMH of Pancreatitis: No  - PMH of Retinopathy: No  - PMH of Urinary Tract Infections: No  - PMH of Yeast Infections: No  - PMH of MTC: No    Lab Review  Lab Results   Component Value Date    BILITOT 0.5 10/23/2024    CALCIUM 10.3 10/23/2024    CO2 27 10/23/2024     10/23/2024    CREATININE 1.34 (H) 10/23/2024    GLUCOSE 174 (H) 10/23/2024    ALKPHOS 52 10/23/2024    K 3.9 10/23/2024    PROT 6.9 10/23/2024      10/23/2024    AST 32 10/23/2024    ALT 42 10/23/2024    BUN 26 (H) 10/23/2024    ANIONGAP 15 10/23/2024    MG 1.76 10/23/2024    ALBUMIN 4.4 10/23/2024    GFRMALE 62 06/12/2023   GFR = 56 (2 months ago)    Lab Results   Component Value Date    TRIG 171 (H) 10/23/2024    CHOL 132 10/23/2024    HDL 35.5 10/23/2024     Lab Results   Component Value Date    HGBA1C 7.6 (H) 10/23/2024    HGBA1C 7.4 (H) 05/13/2024    HGBA1C 7.7 (H) 12/18/2023     The ASCVD Risk score (Skip RENAE, et al., 2019) failed to calculate for the following reasons:    Risk score cannot be calculated because patient has a medical history suggesting prior/existing ASCVD    Drug Interactions:  - Has history of CHF and currently on Actos (will discontinue)    Affordability/Accessibility:  - GLPs are costly  - Eliquis is costly  - Household: 2  - Did not file taxes; SS  - < $63,000    Preferred Pharmacy:  [facility]    Eliquis:  - Dosed appropriately based off Afib.   - Taking twice daily  - Denies any bleeding/bruising  - Costly at the moment    Assessment/Plan   Problem List Items Addressed This Visit       Hyperlipidemia    ASHD (arteriosclerotic heart disease)    Benign essential hypertension    Chronic combined systolic and diastolic CHF (congestive heart failure)    Relevant Medications    empagliflozin (Jardiance) 25 mg    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 (Multi)    Relevant Medications    semaglutide (Ozempic) 0.25 mg or 0.5 mg (2 mg/3 mL) pen injector    empagliflozin (Jardiance) 25 mg    Other Relevant Orders    Referral to Clinical Pharmacy    Routine general medical examination at health care facility    Stage 3a chronic kidney disease (Multi)     Other Visit Diagnoses       Paroxysmal atrial fibrillation (Multi)        Relevant Medications    apixaban (Eliquis) 5 mg tablet            ASSESSMENT:  Patients diabetes is uncontrolled with most recent A1c of 7.6%.   (Goal A1c < 7.5%)    Patient referred for DM  management/cost assistance. He is running high 69% of the time. He is currently on Actos but has a history of CHF so will discontinue medication. I will also switch Exenatide to Ozempic for better BG control as well as cardiovascular protection. Will enroll patient in PAP to help with cost of Ozempic, Jardiance, and Eliquis.     PLAN:  START Ozempic 0.5 mg weekly  STOP Exenatide  STOP Actos   CONTINUE all other DM medications  Education provided:   Ozempic Education:   - Counseled patient on Ozempic MOA, expectations, side effects, duration of therapy, administration, and monitoring parameters.  - Provided detailed dosing and administration counseling to ensure proper technique.   - Reviewed Ozempic titration schedule, starting with 0.25 mg once weekly for 4 weeks, then continuing on 0.5 mg once weekly. Pt verbalized understanding.  - Counseled patient on the benefits of GLP-1ra, such as cardiovascular risk reduction, glycemic control, and weight loss potential.  - Reviewed storage requirements of Ozempic when not in use, and when to administer the medication if a dose is missed.  - Advised patient that they may experience improved satiety after meals and portion sizes of meals may be reduced as doses of Ozempic increase.  - Counseled patient to avoid foods that are fatty/oily as this may precipitate the nausea/GI upset that may occur with new start Ozempic.     Follow up with clinical pharmacist: 2/14/25 @ 11 in clinic  Follow up with PCP: 5/5/25    Thank you,  Katherin Velasquez, PharmD  Clinical Pharmacy Specialist  [phone]  [email or web address]     Continue all meds under the continuation of care with the referring provider and clinical pharmacy team.

## 2025-01-17 ENCOUNTER — APPOINTMENT (OUTPATIENT)
Dept: PRIMARY CARE | Facility: CLINIC | Age: 75
End: 2025-01-17
Payer: MEDICARE

## 2025-01-17 DIAGNOSIS — N18.31 STAGE 3A CHRONIC KIDNEY DISEASE (MULTI): ICD-10-CM

## 2025-01-17 DIAGNOSIS — I10 BENIGN ESSENTIAL HYPERTENSION: ICD-10-CM

## 2025-01-17 DIAGNOSIS — Z00.00 ROUTINE GENERAL MEDICAL EXAMINATION AT HEALTH CARE FACILITY: ICD-10-CM

## 2025-01-17 DIAGNOSIS — E78.5 HYPERLIPIDEMIA, UNSPECIFIED HYPERLIPIDEMIA TYPE: ICD-10-CM

## 2025-01-17 DIAGNOSIS — E11.40 CONTROLLED TYPE 2 DIABETES MELLITUS WITH DIABETIC NEUROPATHY, WITHOUT LONG-TERM CURRENT USE OF INSULIN: ICD-10-CM

## 2025-01-17 DIAGNOSIS — I50.42 CHRONIC COMBINED SYSTOLIC AND DIASTOLIC CHF (CONGESTIVE HEART FAILURE): ICD-10-CM

## 2025-01-17 DIAGNOSIS — I48.0 PAROXYSMAL ATRIAL FIBRILLATION (MULTI): ICD-10-CM

## 2025-01-17 DIAGNOSIS — I25.10 ASHD (ARTERIOSCLEROTIC HEART DISEASE): ICD-10-CM

## 2025-01-17 RX ORDER — SEMAGLUTIDE 0.68 MG/ML
0.5 INJECTION, SOLUTION SUBCUTANEOUS WEEKLY
Qty: 3 ML | Refills: 1 | Status: SHIPPED | OUTPATIENT
Start: 2025-01-17

## 2025-01-23 ENCOUNTER — TELEPHONE (OUTPATIENT)
Dept: PHARMACY | Facility: HOSPITAL | Age: 75
End: 2025-01-23
Payer: MEDICARE

## 2025-01-23 DIAGNOSIS — I48.0 PAF (PAROXYSMAL ATRIAL FIBRILLATION) (MULTI): ICD-10-CM

## 2025-01-23 DIAGNOSIS — E11.42 TYPE 2 DIABETES MELLITUS WITH DIABETIC POLYNEUROPATHY, WITHOUT LONG-TERM CURRENT USE OF INSULIN: Primary | ICD-10-CM

## 2025-01-23 PROCEDURE — RXMED WILLOW AMBULATORY MEDICATION CHARGE

## 2025-01-23 NOTE — TELEPHONE ENCOUNTER
Patient Assistance Program Application Status     Isauro Valdez is a 74 y.o. male who was referred to the Clinical Pharmacy Team by Javier Hong MD     We are pleased to inform you that your application for assistance has been approved.    This approval is valid through 1/23/2026 as long as the following criteria continue to be satisfied:     Your medication (Ozempic/Eliquis) remains covered under your current insurance plan. - Jardiance pending as RTS   Your prescriber does not discontinue therapy.   You do not seek reimbursement from any other private or government-funded programs for the  medication.    Under this program, the pharmacy will first bill your insurance plan for your specified medication. The [employer] will then offset your copay balance, so that your out-of pocket expense for your medication will be $0.00.     Medication will be filled through [facility], and mailed to the patient. Contacted on the status of the approval. Provided the [facility] phone number, and made aware that they will be hearing from someone at [facility] to set up delivery for the prescriptions.     Please reach out to the Clinical Pharmacy Team if there are any further questions.     Follow up with Clinical Pharmacy Team 1 year [facility] renewal     Continue all meds under the continuation of care with the referring provider and clinical pharmacy team.    Verbal consent to manage patient's drug therapy was obtained from patient. They were informed they may decline to participate or withdraw from participation in pharmacy services at any time.

## 2025-01-24 ENCOUNTER — PHARMACY VISIT (OUTPATIENT)
Dept: PHARMACY | Facility: CLINIC | Age: 75
End: 2025-01-24
Payer: MEDICARE

## 2025-01-27 ENCOUNTER — PHARMACY VISIT (OUTPATIENT)
Dept: PHARMACY | Facility: CLINIC | Age: 75
End: 2025-01-27

## 2025-01-29 DIAGNOSIS — I10 ESSENTIAL (PRIMARY) HYPERTENSION: ICD-10-CM

## 2025-01-29 DIAGNOSIS — Z86.79 PERSONAL HISTORY OF OTHER DISEASES OF THE CIRCULATORY SYSTEM: ICD-10-CM

## 2025-01-29 RX ORDER — HYDROCHLOROTHIAZIDE 25 MG/1
TABLET ORAL
Qty: 90 TABLET | Refills: 0 | Status: SHIPPED | OUTPATIENT
Start: 2025-01-29

## 2025-01-29 RX ORDER — PIOGLITAZONEHYDROCHLORIDE 45 MG/1
45 TABLET ORAL DAILY
COMMUNITY
Start: 2025-01-29

## 2025-01-29 RX ORDER — AMLODIPINE BESYLATE 2.5 MG/1
TABLET ORAL
Qty: 90 TABLET | Refills: 1 | Status: SHIPPED | OUTPATIENT
Start: 2025-01-29

## 2025-02-05 PROCEDURE — RXMED WILLOW AMBULATORY MEDICATION CHARGE

## 2025-02-08 ENCOUNTER — PHARMACY VISIT (OUTPATIENT)
Dept: PHARMACY | Facility: CLINIC | Age: 75
End: 2025-02-08
Payer: MEDICARE

## 2025-02-13 ENCOUNTER — APPOINTMENT (OUTPATIENT)
Facility: HOSPITAL | Age: 75
End: 2025-02-13
Payer: MEDICARE

## 2025-02-14 ENCOUNTER — APPOINTMENT (OUTPATIENT)
Dept: PRIMARY CARE | Facility: CLINIC | Age: 75
End: 2025-02-14
Payer: MEDICARE

## 2025-02-14 ENCOUNTER — PHARMACY VISIT (OUTPATIENT)
Dept: PHARMACY | Facility: CLINIC | Age: 75
End: 2025-02-14
Payer: MEDICARE

## 2025-02-14 PROCEDURE — RXMED WILLOW AMBULATORY MEDICATION CHARGE

## 2025-02-27 DIAGNOSIS — E83.52 HYPERCALCEMIA: ICD-10-CM

## 2025-02-27 RX ORDER — GLIMEPIRIDE 1 MG/1
1 TABLET ORAL 2 TIMES DAILY
Qty: 180 TABLET | Refills: 1 | Status: SHIPPED | OUTPATIENT
Start: 2025-02-27 | End: 2025-02-28 | Stop reason: ALTCHOICE

## 2025-02-28 ENCOUNTER — APPOINTMENT (OUTPATIENT)
Dept: PRIMARY CARE | Facility: CLINIC | Age: 75
End: 2025-02-28
Payer: MEDICARE

## 2025-02-28 ENCOUNTER — CLINICAL SUPPORT (OUTPATIENT)
Dept: PRIMARY CARE | Facility: CLINIC | Age: 75
End: 2025-02-28
Payer: MEDICARE

## 2025-02-28 DIAGNOSIS — E11.42 TYPE 2 DIABETES MELLITUS WITH DIABETIC POLYNEUROPATHY, WITHOUT LONG-TERM CURRENT USE OF INSULIN: ICD-10-CM

## 2025-02-28 DIAGNOSIS — I48.0 PAF (PAROXYSMAL ATRIAL FIBRILLATION) (MULTI): ICD-10-CM

## 2025-02-28 PROCEDURE — RXMED WILLOW AMBULATORY MEDICATION CHARGE

## 2025-02-28 RX ORDER — SEMAGLUTIDE 1.34 MG/ML
1 INJECTION, SOLUTION SUBCUTANEOUS WEEKLY
Qty: 3 ML | Refills: 2 | Status: SHIPPED | OUTPATIENT
Start: 2025-02-28

## 2025-02-28 RX ORDER — GLIMEPIRIDE 2 MG/1
2 TABLET ORAL
Qty: 60 TABLET | Refills: 3 | Status: SHIPPED | OUTPATIENT
Start: 2025-02-28 | End: 2026-02-28

## 2025-02-28 NOTE — PROGRESS NOTES
Pharmacist Clinic: Diabetes Management  Isauro Valdez is a 74 y.o. male was referred to Clinical Pharmacy Team for diabetes management.   Referring Provider: Javier Hong, *  - Last visit with referring provider: 1/2/25     Patient Assistance for Ozempic, Eliquis, Jardiance approved through 1/23/26. Will have to be renewed prior to that date to prevent lapse in coverage. Medication(s) will be received at no cost to patient from [facility].     Subjective     HPI    Current Diabetes Pharmacotherapy:    - Ozempic 0.5 mg weekly - Tuesdays (5 doses so far)  - Jardiance 25 mg daily  - Metformin 1,000 mg BID  - Glimepiride 1 mg BID    Social History:  Current diet:   - B: english muffin  - L: soup; left overs  - D: chicken; asparagus; squash  - Trying to switch to mediterranean diet  - Seeing a nutritionist   - Cookies throughout the day  - Sugar free thin mint before bed  - Half a pb sandwich before bed   - Denies pop   - Drinks dark cherry juice for gout sometimes     Current exercise:   - None    Current monitoring regimen:   Patient is using: continuous glucose monitor  Type of CGM: Kalpana 3 plus - reader    Reported blood sugars:     30 day report:  - Above: 84%  - Target: 16%  - Below: 0%    Any episodes of hypoglycemia? No    Adverse Effects: None    Objective     There were no vitals taken for this visit.    Allergies   Allergen Reactions    Adhesive Tape-Silicones Unknown    Niacin Hives and Unknown    Nystatin Other    Penicillins Hives and Unknown       Historical Diabetes Pharmacotherapy:  None    SECONDARY PREVENTION  - Statin? Yes   - ACE-I/ARB? Yes  - Aspirin? Yes    Pertinent PMH Review:  - PMH of Pancreatitis: No  - PMH of Retinopathy: No  - PMH of Urinary Tract Infections: No  - PMH of Yeast Infections: No  - PMH of MTC: No    Lab Review  Lab Results   Component Value Date    BILITOT 0.5 10/23/2024    CALCIUM 10.3 10/23/2024    CO2 27 10/23/2024     10/23/2024    CREATININE  1.34 (H) 10/23/2024    GLUCOSE 174 (H) 10/23/2024    ALKPHOS 52 10/23/2024    K 3.9 10/23/2024    PROT 6.9 10/23/2024     10/23/2024    AST 32 10/23/2024    ALT 42 10/23/2024    BUN 26 (H) 10/23/2024    ANIONGAP 15 10/23/2024    MG 1.76 10/23/2024    ALBUMIN 4.4 10/23/2024    GFRMALE 62 06/12/2023   GFR = 56 (2 months ago)    Lab Results   Component Value Date    TRIG 171 (H) 10/23/2024    CHOL 132 10/23/2024    HDL 35.5 10/23/2024     Lab Results   Component Value Date    HGBA1C 7.6 (H) 10/23/2024    HGBA1C 7.4 (H) 05/13/2024    HGBA1C 7.7 (H) 12/18/2023     The ASCVD Risk score (Skip RENAE, et al., 2019) failed to calculate for the following reasons:    Risk score cannot be calculated because patient has a medical history suggesting prior/existing ASCVD    Drug Interactions:  - None    Affordability/Accessibility:  - Enrolled in [facility]    Preferred Pharmacy:  - [facility]    Assessment/Plan   Problem List Items Addressed This Visit       PAF (paroxysmal atrial fibrillation) (Multi)    Type 2 diabetes mellitus    Relevant Medications    glimepiride (Amaryl) 2 mg tablet    semaglutide (Ozempic) 1 mg/dose (4 mg/3 mL) pen injector    Other Relevant Orders    Hemoglobin A1c    Albumin-Creatinine Ratio, Urine Random    Referral to Clinical Pharmacy       ASSESSMENT:  Patients diabetes is uncontrolled with most recent A1c of 7.6%.   (Goal A1c < 7.5%)    Patient's blood sugars have been running high. This is expected as we have changed his regimen completely last visit. Denies any hypoglycemia or symptoms. At this time, I will increase Ozempic to 1 mg weekly and Glimepiride to 2 mg twice daily with meals.    PLAN:  INCREASE Glimepiride to 2 mg twice daily with meals  INCREASE Ozempic to 1 mg weekly  CONTINUE all other DM medications  Follow up with clinical pharmacist: 3/28/25 @ 3:30  Follow up with PCP: 5/5/25    Thank you,  Katherin Velasquez, PharmD  Clinical Pharmacy Specialist  [phone]  [email or web address]      Continue all meds under the continuation of care with the referring provider and clinical pharmacy team.

## 2025-03-04 ENCOUNTER — PHARMACY VISIT (OUTPATIENT)
Dept: PHARMACY | Facility: CLINIC | Age: 75
End: 2025-03-04
Payer: MEDICARE

## 2025-03-06 LAB
ALBUMIN/CREAT UR: 35 MG/G CREAT
CREAT UR-MCNC: 65 MG/DL (ref 20–320)
EST. AVERAGE GLUCOSE BLD GHB EST-MCNC: 212 MG/DL
EST. AVERAGE GLUCOSE BLD GHB EST-SCNC: 11.7 MMOL/L
HBA1C MFR BLD: 9 % OF TOTAL HGB
MICROALBUMIN UR-MCNC: 2.3 MG/DL

## 2025-03-10 DIAGNOSIS — E11.49 TYPE 2 DIABETES MELLITUS WITH OTHER DIABETIC NEUROLOGICAL COMPLICATION: ICD-10-CM

## 2025-03-10 DIAGNOSIS — M10.9 GOUTY ARTHRITIS OF LEFT HAND: ICD-10-CM

## 2025-03-10 DIAGNOSIS — E79.0 HYPERURICEMIA: ICD-10-CM

## 2025-03-10 RX ORDER — ALLOPURINOL 100 MG/1
200 TABLET ORAL DAILY
Qty: 180 TABLET | Refills: 1 | Status: SHIPPED | OUTPATIENT
Start: 2025-03-10

## 2025-03-10 RX ORDER — METFORMIN HYDROCHLORIDE 1000 MG/1
TABLET ORAL
Qty: 180 TABLET | Refills: 0 | Status: SHIPPED | OUTPATIENT
Start: 2025-03-10

## 2025-03-12 NOTE — PATIENT INSTRUCTIONS
Thank you for choosing [facility]  for your health care needs.  If you have any questions, concerns or medical needs, please feel free to contact our office at [phone].    Please ensure you complete your blood work one week before the next scheduled appointment.    To continue Ozempic 1mg SQ once weekly x 2 more doses; increase to the 2mg dose as per pharmacy   To increase Glimepiride to 4mg twice daily before breakfast and dinner   To continue Jardiance 25mg once daily   To continue Metformin 1000mg twice daily   Please check blood sugars once daily and keep a detailed log  Counseled that the goal A1C should be 7% or less  Counseled glycemic control is warranted to prevent microvascular complications  Please stick to a low carb diet   Please be more physically active and use Silver Sneakers   To continue the use of your CGM for the intensive glucose monitoring due to the dynamic nature of insulin requirements, the narrow therapeutic index of insulin and the potentially fatal consequences of treatment  Counseled that the time in range should be >70%  For follow up in 3-4 months

## 2025-03-12 NOTE — PROGRESS NOTES
"Subjective   Isauro Valdez is a 74 y.o. male who presents for follow-up of Type 2 diabetes mellitus. The initial diagnosis of diabetes was made in 2015 .     He has had no major changes to his health since his last appointment.      Known complications due to diabetes included peripheral neuropathy, CAD s/p MI in 2003 with 1 stent in situ, chronic kidney disease, and obesity    Cardiovascular risk factors include advanced age (older than 55 for men, 65 for women), diabetes mellitus, male gender, and obesity (BMI >= 30 kg/m2). The patient is on an ACE inhibitor or angiotensin II receptor blocker.  The patient has not been previously hospitalized due to diabetic ketoacidosis.     Current symptoms/problems include none. His clinical course has been stable.     The patient is currently checking the blood glucose multiple times per day    Patient is using: continuous glucose monitor                                                                Hypoglycemia frequency: 0%   Hypoglycemia awareness: N/A     Current Medication Regimen  Ozempic 1mg SQ once weekly   Glimepiride 2mg twice daily   Jardiance 25mg once daily   Metformin 1000mig twice daily     MEALS:   Loves bread and sweets  Does not eat vegeables   Can eat a peanut butter sandwich before bed and then would want to eat again in another 1 hour        Denies exercise regimen given back pain and thus not going ot the gym        Review of Systems   Musculoskeletal:  Positive for arthralgias and back pain.   All other systems reviewed and are negative.      Objective   /70   Pulse 95   Ht 1.905 m (6' 3\")   Wt 116 kg (255 lb 6.4 oz)   BMI 31.92 kg/m²   Physical Exam  Vitals and nursing note reviewed.   Constitutional:       General: He is not in acute distress.     Appearance: Normal appearance. He is obese.   HENT:      Head: Normocephalic and atraumatic.      Nose: Nose normal.      Mouth/Throat:      Mouth: Mucous membranes are moist.   Eyes:      " Extraocular Movements: Extraocular movements intact.   Pulmonary:      Effort: Pulmonary effort is normal.   Musculoskeletal:         General: Normal range of motion.   Neurological:      Mental Status: He is alert and oriented to person, place, and time.   Psychiatric:         Mood and Affect: Mood normal.         Lab Review  Glucose (mg/dL)   Date Value   10/23/2024 174 (H)   05/13/2024 193 (H)   12/18/2023 180 (H)     HEMOGLOBIN A1c (% of total Hgb)   Date Value   03/05/2025 9.0 (H)     Hemoglobin A1C (%)   Date Value   10/23/2024 7.6 (H)   05/13/2024 7.4 (H)   12/18/2023 7.7 (H)     Bicarbonate (mmol/L)   Date Value   10/23/2024 27   05/13/2024 28   12/18/2023 29     Urea Nitrogen (mg/dL)   Date Value   10/23/2024 26 (H)   05/13/2024 30 (H)   12/18/2023 25 (H)     Creatinine (mg/dL)   Date Value   10/23/2024 1.34 (H)   05/13/2024 1.34 (H)   12/18/2023 1.23     Lab Results   Component Value Date    CHOL 132 10/23/2024    CHOL 126 05/13/2024    CHOL 142 12/18/2023     Lab Results   Component Value Date    HDL 35.5 10/23/2024    HDL 34.2 05/13/2024    HDL 36.8 12/18/2023     Lab Results   Component Value Date    LDLCALC 62 10/23/2024    LDLCALC 55 05/13/2024    LDLCALC 63 12/18/2023     Lab Results   Component Value Date    TRIG 171 (H) 10/23/2024    TRIG 182 (H) 05/13/2024    TRIG 212 (H) 12/18/2023       Health Maintenance:   Foot Exam: Today at 2pm   Eye Exam:  October 24, 2023  Urine Albumin: March 5, 2025    CGM Interpretation  14 day CGM download was reviewed in detail as documented above and will be attached to chart.  A minimum of 72 hours of glucose data was used to inform the management plan outlined below.    Sufficient data to analyze:  Yes; CGM active 96% of the time  91% of values is above target range, which is markedly elevated above goal.    9% of values is spent in target range, and thus is markedly below goal   0% of values is spent with hypoglycemia, and thus at goal     Assessment/Plan       74-year-old male presents for follow up for type 2 diabetes mellitus. His blood pressure is at goal. He is noted to be on a statin.     Poorly controlled type 2 diabetes mellitus (Multi)  To continue Ozempic 1mg SQ once weekly x 2 more doses; increase to the 2mg dose as per pharmacy   To increase Glimepiride to 4mg twice daily before breakfast and dinner   To continue Jardiance 25mg once daily   To continue Metformin 1000mg twice daily   Please check blood sugars once daily and keep a detailed log  Counseled that the goal A1C should be 7% or less  Counseled glycemic control is warranted to prevent microvascular complications  Please stick to a low carb diet   Please be more physically active and use Silver Sneakers   To continue the use of your CGM for the intensive glucose monitoring due to the dynamic nature of insulin requirements, the narrow therapeutic index of insulin and the potentially fatal consequences of treatment  Counseled that the time in range should be >70%  For follow up in 3-4 months

## 2025-03-13 ENCOUNTER — APPOINTMENT (OUTPATIENT)
Dept: ENDOCRINOLOGY | Facility: CLINIC | Age: 75
End: 2025-03-13
Payer: MEDICARE

## 2025-03-13 VITALS
SYSTOLIC BLOOD PRESSURE: 106 MMHG | DIASTOLIC BLOOD PRESSURE: 70 MMHG | BODY MASS INDEX: 31.76 KG/M2 | HEART RATE: 95 BPM | WEIGHT: 255.4 LBS | HEIGHT: 75 IN

## 2025-03-13 DIAGNOSIS — E11.65 POORLY CONTROLLED TYPE 2 DIABETES MELLITUS (MULTI): ICD-10-CM

## 2025-03-13 PROCEDURE — 1159F MED LIST DOCD IN RCRD: CPT | Performed by: INTERNAL MEDICINE

## 2025-03-13 PROCEDURE — 3074F SYST BP LT 130 MM HG: CPT | Performed by: INTERNAL MEDICINE

## 2025-03-13 PROCEDURE — 1157F ADVNC CARE PLAN IN RCRD: CPT | Performed by: INTERNAL MEDICINE

## 2025-03-13 PROCEDURE — 1160F RVW MEDS BY RX/DR IN RCRD: CPT | Performed by: INTERNAL MEDICINE

## 2025-03-13 PROCEDURE — 4010F ACE/ARB THERAPY RXD/TAKEN: CPT | Performed by: INTERNAL MEDICINE

## 2025-03-13 PROCEDURE — 3008F BODY MASS INDEX DOCD: CPT | Performed by: INTERNAL MEDICINE

## 2025-03-13 PROCEDURE — 1123F ACP DISCUSS/DSCN MKR DOCD: CPT | Performed by: INTERNAL MEDICINE

## 2025-03-13 PROCEDURE — 99214 OFFICE O/P EST MOD 30 MIN: CPT | Performed by: INTERNAL MEDICINE

## 2025-03-13 PROCEDURE — 3078F DIAST BP <80 MM HG: CPT | Performed by: INTERNAL MEDICINE

## 2025-03-13 PROCEDURE — 95251 CONT GLUC MNTR ANALYSIS I&R: CPT | Performed by: INTERNAL MEDICINE

## 2025-03-13 PROCEDURE — G2211 COMPLEX E/M VISIT ADD ON: HCPCS | Performed by: INTERNAL MEDICINE

## 2025-03-13 RX ORDER — GLIMEPIRIDE 4 MG/1
4 TABLET ORAL
Qty: 180 TABLET | Refills: 1 | Status: SHIPPED | OUTPATIENT
Start: 2025-03-13 | End: 2025-09-09

## 2025-03-13 ASSESSMENT — ENCOUNTER SYMPTOMS
BACK PAIN: 1
ARTHRALGIAS: 1

## 2025-03-17 NOTE — ASSESSMENT & PLAN NOTE
To continue Ozempic 1mg SQ once weekly x 2 more doses; increase to the 2mg dose as per pharmacy   To increase Glimepiride to 4mg twice daily before breakfast and dinner   To continue Jardiance 25mg once daily   To continue Metformin 1000mg twice daily   Please check blood sugars once daily and keep a detailed log  Counseled that the goal A1C should be 7% or less  Counseled glycemic control is warranted to prevent microvascular complications  Please stick to a low carb diet   Please be more physically active and use Silver Sneakers   To continue the use of your CGM for the intensive glucose monitoring due to the dynamic nature of insulin requirements, the narrow therapeutic index of insulin and the potentially fatal consequences of treatment  Counseled that the time in range should be >70%  For follow up in 3-4 months

## 2025-03-20 ENCOUNTER — APPOINTMENT (OUTPATIENT)
Facility: HOSPITAL | Age: 75
End: 2025-03-20
Payer: MEDICARE

## 2025-03-24 ENCOUNTER — APPOINTMENT (OUTPATIENT)
Dept: CARDIOLOGY | Facility: HOSPITAL | Age: 75
End: 2025-03-24
Payer: MEDICARE

## 2025-03-24 LAB
ALBUMIN SERPL-MCNC: 4.7 G/DL (ref 3.6–5.1)
ALP SERPL-CCNC: 67 U/L (ref 35–144)
ALT SERPL-CCNC: 72 U/L (ref 9–46)
ANION GAP SERPL CALCULATED.4IONS-SCNC: 13 MMOL/L (CALC) (ref 7–17)
AST SERPL-CCNC: 43 U/L (ref 10–35)
BILIRUB SERPL-MCNC: 0.5 MG/DL (ref 0.2–1.2)
BUN SERPL-MCNC: 29 MG/DL (ref 7–25)
CALCIUM SERPL-MCNC: 10.9 MG/DL (ref 8.6–10.3)
CHLORIDE SERPL-SCNC: 100 MMOL/L (ref 98–110)
CHOLEST SERPL-MCNC: 148 MG/DL
CHOLEST/HDLC SERPL: 4.1 (CALC)
CO2 SERPL-SCNC: 29 MMOL/L (ref 20–32)
CREAT SERPL-MCNC: 1.18 MG/DL (ref 0.7–1.28)
EGFRCR SERPLBLD CKD-EPI 2021: 66 ML/MIN/1.73M2
ERYTHROCYTE [DISTWIDTH] IN BLOOD BY AUTOMATED COUNT: 13.2 % (ref 11–15)
GLUCOSE SERPL-MCNC: 214 MG/DL (ref 65–99)
HCT VFR BLD AUTO: 47 % (ref 38.5–50)
HDLC SERPL-MCNC: 36 MG/DL
HGB BLD-MCNC: 15.3 G/DL (ref 13.2–17.1)
LDLC SERPL CALC-MCNC: 87 MG/DL (CALC)
MAGNESIUM SERPL-MCNC: 2 MG/DL (ref 1.5–2.5)
MCH RBC QN AUTO: 29.9 PG (ref 27–33)
MCHC RBC AUTO-ENTMCNC: 32.6 G/DL (ref 32–36)
MCV RBC AUTO: 91.8 FL (ref 80–100)
NONHDLC SERPL-MCNC: 112 MG/DL (CALC)
PLATELET # BLD AUTO: 216 THOUSAND/UL (ref 140–400)
PMV BLD REES-ECKER: 10.7 FL (ref 7.5–12.5)
POTASSIUM SERPL-SCNC: 3.9 MMOL/L (ref 3.5–5.3)
PROT SERPL-MCNC: 7.4 G/DL (ref 6.1–8.1)
RBC # BLD AUTO: 5.12 MILLION/UL (ref 4.2–5.8)
SODIUM SERPL-SCNC: 142 MMOL/L (ref 135–146)
TRIGL SERPL-MCNC: 156 MG/DL
WBC # BLD AUTO: 6.7 THOUSAND/UL (ref 3.8–10.8)

## 2025-03-25 DIAGNOSIS — E78.00 PURE HYPERCHOLESTEROLEMIA: ICD-10-CM

## 2025-03-25 DIAGNOSIS — I25.10 ASHD (ARTERIOSCLEROTIC HEART DISEASE): Primary | ICD-10-CM

## 2025-03-25 RX ORDER — EZETIMIBE 10 MG/1
10 TABLET ORAL DAILY
Qty: 90 TABLET | Refills: 3 | Status: SHIPPED | OUTPATIENT
Start: 2025-03-25 | End: 2026-03-25

## 2025-03-25 NOTE — TELEPHONE ENCOUNTER
3/25/25  1036  Called lipid panel results to patient and plan for starting Zetia 10 mg daily.     Patient verbalized understanding of results and is agreeable to plan.        New Rx for Zetia 10 mg daily sent for approval.    ----- Message from Denzel Nunez sent at 3/25/2025  8:05 AM EDT -----  Please call the patient regarding his abnormal result.  Serum calcium elevated at 10.9.  Management of elevated calcium per PCP.  Lipid panel shows suboptimal control of his cholesterol with an LDL cholesterol of 87 and triglycerides of 156.  Please start patient on Zetia 10 mg daily in addition to his current dose of rosuvastatin for additional LDL reduction.

## 2025-03-27 NOTE — PROGRESS NOTES
Pharmacist Clinic: Diabetes Management  Isauro Valdez is a 74 y.o. male was referred to Clinical Pharmacy Team for diabetes management.   Referring Provider: Javier Hong, *  - Last visit with referring provider: 1/2/25     Patient Assistance for Ozempic, Eliquis, Jardiance approved through 1/23/26. Will have to be renewed prior to that date to prevent lapse in coverage. Medication(s) will be received at no cost to patient from [facility].     Subjective     HPI    Current Diabetes Pharmacotherapy:    - Ozempic 1 mg weekly - Wednesdays   - Jardiance 25 mg daily  - Metformin 1,000 mg BID  - Glimepiride 4 mg BID with meals    Social History:  Current diet:   - B: english muffin  - L: soup; left overs  - D: chicken; asparagus; squash  - Trying to switch to mediterranean diet  - Seeing a nutritionist   - Cookies throughout the day  - Sugar free thin mint before bed  - Half a pb sandwich before bed   - Denies pop   - Drinks dark cherry juice for gout sometimes     Current exercise:   - None    Current monitoring regimen:   Patient is using: continuous glucose monitor  Type of CGM: Kalpana 3 plus - reader    Reported blood sugars:     30 day report:  - Above: 84%  - Target: 16%  - Below: 0%    14 day report:  - Above: 77%  - Target: 23%  - Below: 0%    7 day report:  - Above: 94%  - Target: 6%  - Below: 0%    Any episodes of hypoglycemia? Yes - 1 episode     Adverse Effects: None    Objective     There were no vitals taken for this visit.    Allergies   Allergen Reactions    Adhesive Tape-Silicones Unknown    Niacin Hives and Unknown    Nystatin Other    Penicillins Hives and Unknown       Historical Diabetes Pharmacotherapy:  None    SECONDARY PREVENTION  - Statin? Yes   - ACE-I/ARB? Yes  - Aspirin? Yes    Pertinent PMH Review:  - PMH of Pancreatitis: No  - PMH of Retinopathy: No  - PMH of Urinary Tract Infections: No  - PMH of Yeast Infections: No  - PMH of MTC: No    Lab Review  Lab Results    Component Value Date    BILITOT 0.5 03/24/2025    CALCIUM 10.9 (H) 03/24/2025    CO2 29 03/24/2025     03/24/2025    CREATININE 1.18 03/24/2025    GLUCOSE 214 (H) 03/24/2025    ALKPHOS 67 03/24/2025    K 3.9 03/24/2025    PROT 7.4 03/24/2025     03/24/2025    AST 43 (H) 03/24/2025    ALT 72 (H) 03/24/2025    BUN 29 (H) 03/24/2025    ANIONGAP 13 03/24/2025    MG 2.0 03/24/2025    ALBUMIN 4.7 03/24/2025    GFRMALE 62 06/12/2023   GFR = 56 (2 months ago)    Lab Results   Component Value Date    TRIG 156 (H) 03/24/2025    CHOL 148 03/24/2025    HDL 36 (L) 03/24/2025     Lab Results   Component Value Date    HGBA1C 9.0 (H) 03/05/2025    HGBA1C 7.6 (H) 10/23/2024    HGBA1C 7.4 (H) 05/13/2024     The ASCVD Risk score (Skip RENAE, et al., 2019) failed to calculate for the following reasons:    Risk score cannot be calculated because patient has a medical history suggesting prior/existing ASCVD    Drug Interactions:  - None    Affordability/Accessibility:  - Enrolled in [facility]    Preferred Pharmacy:  - [facility]    Assessment/Plan   Problem List Items Addressed This Visit       Type 2 diabetes mellitus    Relevant Medications    semaglutide (Ozempic) 2 mg/dose (8 mg/3 mL) pen injector    Other Relevant Orders    Referral to Clinical Pharmacy     ASSESSMENT:  Patients diabetes is uncontrolled with most recent A1c of 9.0%.   (Goal A1c < 7.5%)    Patient's A1c has worsened. Time in range still remains low. Dr. Harry increased Glimepiride to 4 mg BID with meals. Today we will increase Ozempic to 2 mg weekly. Explained to him that if blood sugars do not go down, we will need to add on insulin. He wishes to give the increased Ozempic dose a chance before adding insulin. We will follow in 3 weeks.     PLAN:  INCREASE Ozempic to 2 mg weekly  CONTINUE all other DM medications  Follow up with clinical pharmacist: 4/18/25 @ 3:40  Follow up with PCP: 5/5/25    Thank you,  Katherin Velasquez, PharmD  Clinical Pharmacy  Specialist  [phone]  [email or web address]     Continue all meds under the continuation of care with the referring provider and clinical pharmacy team.

## 2025-03-28 ENCOUNTER — APPOINTMENT (OUTPATIENT)
Dept: PRIMARY CARE | Facility: CLINIC | Age: 75
End: 2025-03-28
Payer: MEDICARE

## 2025-03-28 DIAGNOSIS — E11.42 TYPE 2 DIABETES MELLITUS WITH DIABETIC POLYNEUROPATHY, WITHOUT LONG-TERM CURRENT USE OF INSULIN: ICD-10-CM

## 2025-03-28 PROBLEM — I50.32 HEART FAILURE WITH RECOVERED EJECTION FRACTION (HFRECEF): Status: ACTIVE | Noted: 2025-03-28

## 2025-03-28 PROBLEM — N18.9 CKD (CHRONIC KIDNEY DISEASE): Status: ACTIVE | Noted: 2025-03-28

## 2025-03-28 PROCEDURE — RXMED WILLOW AMBULATORY MEDICATION CHARGE

## 2025-03-28 RX ORDER — SEMAGLUTIDE 2.68 MG/ML
2 INJECTION, SOLUTION SUBCUTANEOUS WEEKLY
Qty: 3 ML | Refills: 1 | Status: SHIPPED | OUTPATIENT
Start: 2025-03-28

## 2025-03-28 NOTE — PROGRESS NOTES
[facility]    Patient Name: Isauro Valdez  Patient : 1950    Scribe Attestation  By signing my name below, Magdalene WHITE Scribe attest that this documentation has been prepared under the direction and in the presence of Denzel Nunez DO.    Physician Attestation  Denzel WHITE DO, personally performed the services described in the documentation as scribed by Magdalene Mauricio in my presence, and confirm it is both accurate and complete.    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  Patient was last evaluated by me in 2024.  At that visit I ordered blood work including CMP/lipid/magnesium/CBC to be drawn in 6 months, ordered an echocardiogram, and asked the patient to follow-up in 6 months and sooner if necessary.  CMP done in 2025 showed normal serum sodium and potassium with a serum creatinine of 1.18 consistent with known CKD, ALT 72, AST 43, serum magnesium was 2.0. Serum calcium was 10.9. CBC showed hemoglobin of 15.3.  Lipid panel done in 2025 showed an LDL cholesterol of 87 and triglycerides of 156 while on rosuvastatin 20 mg daily. Patient was asked to start Zetia 10 mg daily in  addition to current dose of rosuvastatin.  Hemoglobin A1c done in March 2025 was 9.0%.  Echocardiogram done in October 2024 showed normal left ventricular systolic function with an ejection fraction of 58%, grade 1 diastolic dysfunction, septal LVH, low normal right ventricular systolic function, and a dilated aortic root measured at 4.3 cm. ECG done today showed sinus rhythm with a heart rate of 87 bpm. The patient reports that he has been feeling generally well from the cardiac standpoint. He denies any new chest pain, shortness of breath, palpitations and lightheadedness. He states that he takes all of his medications as prescribed. During my exam, he was resting comfortably on the exam table.            Assessment/Plan:   1. Coronary artery disease  The patient has a history of coronary artery disease status post stenting of his RCA done in 2003.  ECG done today showed sinus rhythm with a heart rate of 87 bpm.    He denies anginal chest discomfort.  Blood pressure appears controlled on exam today.  He should continue his current antihypertensive medications and antiplatelet therapy.  Recent lab works as noted in the HPI.   Lipid panel done in March 2025 showed an LDL cholesterol of 87 and triglycerides of 156 while on rosuvastatin 20 mg daily.   He is currently on rosuvastatin 20 mg daily and Zetia 10 mg daily.   Echocardiogram done in October 2024 showed normal left ventricular systolic function with an ejection fraction of 58%, grade 1 diastolic dysfunction, septal LVH, low normal right ventricular systolic function, and a dilated aortic root measured at 4.3 cm.   Recent lab works as noted in the HPI.   Lab works will be done today and again in 6 months prior to the next visit.   Please also see lifestyle recommendations below.  Follow up in 6 months and sooner if necessary.      2. 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  Echocardiogram done in October 2024  showed normal left ventricular systolic function with an ejection fraction of 58%, grade 1 diastolic dysfunction, septal LVH, low normal right ventricular systolic function, and a dilated aortic root measured at 4.3 cm.   He does not appear significantly volume overloaded on exam today.  He should continue his current cardiac medications.  Recent lab works as noted in the HPI.   Lab works as noted below will be done in 6 months prior to his next visit.   I discussed with him the importance of following a low-sodium heart healthy diet as well as weight loss.   Follow up in 6 months and sooner if necessary.      3.  Paroxysmal atrial fibrillation  The patient has a history of paroxysmal atrial fibrillation on apixaban for thromboembolism prophylaxis which should be continued.  He should continue carvedilol for heart rate control.  ECG done today showed sinus rhythm with a heart rate of 87 bpm.   He denies chest pain, palpitations or lightheadedness.   Echocardiogram done in October 2024 showed normal left ventricular systolic function with an ejection fraction of 58%, grade 1 diastolic dysfunction, septal LVH, low normal right ventricular systolic function, and a dilated aortic root measured at 4.3 cm.   Recent lab works as noted in the HPI.  Lab works as noted below will be done in 6 months prior to his next visit.    Follow up in 6 months and sooner if necessary.      4. Anticoagulation with apixaban  The patient is on anticoagulation with apixaban for atrial fibrillation.  Recent lab works as noted in the HPI.  Lab works as noted below will be done in 6 months prior to his next visit.      5. Thoracic ascending aortic aneurysm  The patient has a history of thoracic ascending aortic aneurysm.  Echocardiogram done in October 2024 showed normal left ventricular systolic function with an ejection fraction of 58%, grade 1 diastolic dysfunction, septal LVH, low normal right ventricular systolic function, and a dilated  aortic root measured at 4.3 cm.   Continue risk factor modification.  Will check echocardiogram 1 year from prior.     6. Hypertension  The patient has a history of hypertension which appears controlled on exam today.  He should continue his current antihypertensive medications and monitor his blood pressure at home.      7. Dyslipidemia  Lipid panel done in March 2025 showed an LDL cholesterol of 87 and triglycerides of 156 while on rosuvastatin 20 mg daily.   He is currently on rosuvastatin 20 mg daily and Zetia 10 mg daily.   Lipid panel will be updated in 6 months.  Please see lifestyle recommendations below.      8. Diabetes mellitus  Hemoglobin A1c done in March 2025 was 9.0%.   Medication management per primary care provider.     9. Obstructive sleep apnea  Patient has a history of obstructive sleep apnea noncompliant with CPAP therapy.  Management as per PCP.     10. CKD  Serum creatinine done in March 2025 was 1.18 suggesting CKD.  Management as per PCP.     11. Obesity  Please see lifestyle recommendations below.       Orders:   Echocardiogram in 6 months  CMP/lipid/magnesium/CBC/BNP in 6 months,   Follow-up in 6 months.      Lifestyle Recommendations  I recommend a whole-food plant-based diet, an eating pattern that encourages the consumption of unrefined plant foods (such as fruits, vegetables, tubers, whole grains, legumes, nuts and seeds) and discourages meats, dairy products, eggs and processed foods.     The AHA/ACC recommends that the patient consume a dietary pattern that emphasizes intake of vegetables, fruits, and whole grains; includes low-fat dairy products, poultry, fish, legumes, non-tropical vegetable oils, and nuts; and limits intake of sodium, sweets, sugar-sweetened beverages, and red meats.  Adapt this dietary pattern to appropriate calorie requirements (a 500-750 kcal/day deficit to loose weight), personal and cultural food preferences, and nutrition therapy for other medical  conditions (including diabetes).  Achieve this pattern by following plans such as the [place] Mediterranean, DASH dietary pattern, or AHA diet.     Engage in 2 hours and 30 minutes per week of moderate-intensity physical activity, or 1 hour and 15 minutes (75 minutes) per week of vigorous-intensity aerobic physical activity, or an equivalent combination of moderate and vigorous-intensity aerobic physical activity. Aerobic activity should be performed in episodes of at least 10 minutes preferably spread throughout the week.     Adhering to a heart healthy diet, regular exercise habits, avoidance of tobacco products, and maintenance of a healthy weight are crucial components of their heart disease risk reduction.     Any positive review of systems not specifically addressed in the office visit today should be evaluated and treated by the patients primary care physician or in an emergency department if necessary     Patient was notified that results from ordered tests will be called to the patient if it changes current management; it will otherwise be discussed at a future appointment and available on  LYYN.     Thank you for allowing me to participate in the care of this patient.        This document was generated using the assistance of voice recognition software. If there are any errors of spelling, grammar, syntax, or meaning; please feel free to contact me directly for clarification.    Past Medical History:  He has a past medical history of Cellulitis of left lower limb (09/06/2019), Corns and callosities (09/06/2019), Coronary angioplasty status (09/06/2019), Old myocardial infarction (09/06/2019), Other forms of dyspnea (02/19/2020), Personal history of diabetic foot ulcer (09/06/2019), Personal history of other diseases of the circulatory system (10/07/2019), Personal history of other drug therapy (09/06/2019), Personal history of other drug therapy (09/06/2019), Personal history of other drug therapy  (09/06/2019), Personal history of other endocrine, nutritional and metabolic disease, Personal history of other specified conditions (09/06/2019), Personal history of urinary calculi, Personal history of urinary calculi (09/06/2019), Rash and other nonspecific skin eruption (12/27/2018), and 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    Past Surgical History:  He has a past surgical history that includes Coronary angioplasty (05/17/2017); Ankle surgery (05/17/2017); Other surgical history (05/17/2017); Lumbar laminectomy (05/17/2017); Lithotripsy (05/17/2017); Knee arthroscopy w/ debridement (05/17/2017); Other surgical history (09/01/2022); and Foot surgery (06/12/2018).      Social History:  He reports that he has never smoked. He has quit using smokeless tobacco. He reports that he does not currently use alcohol. He reports that he does not use drugs.    Family History:  Family History   Problem Relation Name Age of Onset    Heart attack Other          Allergies:  Adhesive tape-silicones, Niacin, Nystatin, and Penicillins    Outpatient Medications:  Current Outpatient Medications   Medication Instructions    allopurinol (ZYLOPRIM) 200 mg, oral, Daily    amLODIPine (Norvasc) 2.5 mg tablet TAKE 1 TABLET BY MOUTH EVERYDAY AT BEDTIME    ascorbic acid (Vitamin C) 500 mg tablet Take by mouth.    aspirin 81 mg EC tablet Take by mouth.    blood-glucose sensor (FreeStyle Kalpana 3 Plus Sensor) device For continuous glucose monitoring.  [place] every 15 days    carvedilol (Coreg) 25 mg tablet TAKE 1 TABLET BY MOUTH TWICE A DAY WITH MORNING AND EVENING MEAL    cloNIDine (CATAPRES) 0.1 mg, oral, 2 times daily    colchicine 0.6 mg, oral, As needed    colestipol (Colestid) 1 gram tablet TAKE 2 TABLETS BY MOUTH TWICE A DAY    Eliquis 5 mg, oral, 2 times daily    ezetimibe (ZETIA) 10 mg, oral, Daily    famotidine (Pepcid) 20 mg tablet TAKE 1 TABLET BY MOUTH EVERY DAY AS DIRECTED    ferrous gluconate (Fergon)  324 (38 Fe) mg tablet 38 mg of iron, oral, Daily with breakfast    ferrous sulfate 325 (65 Fe) MG tablet Take 1 tablet (325 mg) by mouth early in the morning..    fish oil concentrate (Omega-3) 120-180 mg capsule 1 capsule, Daily    FreeStyle Kalpana 3 [place] misc Use as instructed    FreeStyle Kalpana 3 Sensor device Change sensor every 14 days as directed    furosemide (LASIX) 40 mg, oral, Daily    gabapentin (NEURONTIN) 600 mg, oral, Nightly    glimepiride (AMARYL) 4 mg, oral, 2 times daily before meals    glucosamine-chondroit-vit C-Mn 500-400 mg capsule Take by mouth.    hydroCHLOROthiazide (HYDRODiuril) 25 mg tablet TAKE 1 TABLET BY MOUTH EVERY DAY    Jardiance 25 mg, oral, Daily    lansoprazole (Prevacid) 15 mg DR capsule 1 capsule, Daily    losartan (COZAAR) 100 mg, oral, Daily    metFORMIN (Glucophage) 1,000 mg tablet TAKE 1 TABLET EVERY 12 HOURS DAILY    multivitamin with folic acid (One Daily Multivitamin) 400 mcg tablet 1 tablet, Daily    OneTouch Ultra Test strip USE AS DIRECTED 3 TIMES A DAY    OneTouch Ultra Test strip 1 each, Does not apply, 3 times daily    Ozempic 2 mg, subcutaneous, Weekly    rosuvastatin (CRESTOR) 20 mg, oral, Daily    saw palmetto 450 mg capsule 1 capsule, Daily    triamcinolone (Kenalog) 0.1 % cream 2 times daily    turmeric-turmeric root extract 450-50 mg capsule 1 capsule, Daily    walker misc 1 each, miscellaneous, Daily, Rolator walker, he has ambulatory dysfunction    zinc gluconate 50 mg tablet 1 tablet, Daily        ROS:  A 14 point review of systems was done and is negative other than as stated in HPI    Vitals:      3/20/2024    12:26 PM 8/19/2024    10:35 AM 8/19/2024    11:58 AM 9/12/2024    10:53 AM 9/23/2024     3:19 PM 1/2/2025    11:29 AM 3/13/2025    10:30 AM   Vitals   Systolic 120 149 138 122 124 97 106   Diastolic 70 79 78 72 74 63 70   BP Location  Right arm Right arm Left arm Left arm Left arm    Heart Rate 87 93  84 90 82 95   Temp  36.4 °C (97.6 °F)    36.4  "°C (97.5 °F)    Resp  16    16    Height 1.905 m (6' 3\") 1.905 m (6' 3\")  1.905 m (6' 3\") 1.905 m (6' 3\") 1.905 m (6' 3\") 1.905 m (6' 3\")   Weight (lb) 266.8 262.8  265 261.8 265.5 255.4   BMI 33.35 kg/m2 32.85 kg/m2  33.12 kg/m2 32.72 kg/m2 33.19 kg/m2 31.92 kg/m2   BSA (m2) 2.53 m2 2.51 m2  2.52 m2 2.51 m2 2.52 m2 2.48 m2        Physical Exam:   Constitutional: Cooperative, in no acute distress, alert, appears stated age.  Skin: Skin color, texture, turgor normal. No rashes or lesions.  Head: Normocephalic. No masses, lesions, tenderness or abnormalities  Eyes: Extraocular movements are grossly intact.  Mouth and throat: Mucous membranes moist  Neck: Neck supple, no carotid bruits, no JVD  Respiratory: Lungs clear to auscultation, no wheezing or rhonchi, no use of accessory muscles  Chest wall: No scars, normal excursion with respiration  Cardiovascular: Regular rhythm without murmur  Gastrointestinal: Abdomen soft, nontender. Bowel sounds normal. Obese.  Musculoskeletal: Strength equal in upper extremities  Extremities: No pitting edema  Neurologic: Sensation grossly intact, alert and oriented ×3        Intake/Output:   No intake/output data recorded.    Outpatient Medications  Current Outpatient Medications on File Prior to Visit   Medication Sig Dispense Refill    allopurinol (Zyloprim) 100 mg tablet TAKE 2 TABLETS BY MOUTH ONCE DAILY. 180 tablet 1    amLODIPine (Norvasc) 2.5 mg tablet TAKE 1 TABLET BY MOUTH EVERYDAY AT BEDTIME 90 tablet 1    apixaban (Eliquis) 5 mg tablet Take 1 tablet (5 mg) by mouth 2 times a day. 180 tablet 3    ascorbic acid (Vitamin C) 500 mg tablet Take by mouth.      aspirin 81 mg EC tablet Take by mouth.      blood-glucose sensor (FreeStyle Kalpana 3 Plus Sensor) device For continuous glucose monitoring.  [place] every 15 days 2 each 11    carvedilol (Coreg) 25 mg tablet TAKE 1 TABLET BY MOUTH TWICE A DAY WITH MORNING AND EVENING MEAL 180 tablet 1    cloNIDine (Catapres) 0.1 mg tablet TAKE " 1 TABLET BY MOUTH TWICE A  tablet 3    colchicine 0.6 mg tablet Take 1 tablet (0.6 mg) by mouth if needed for muscle/joint pain (gout). 60 tablet 0    colestipol (Colestid) 1 gram tablet TAKE 2 TABLETS BY MOUTH TWICE A  tablet 5    empagliflozin (Jardiance) 25 mg Take 1 tablet (25 mg) by mouth once daily. 90 tablet 3    ezetimibe (Zetia) 10 mg tablet Take 1 tablet (10 mg) by mouth once daily. 90 tablet 3    famotidine (Pepcid) 20 mg tablet TAKE 1 TABLET BY MOUTH EVERY DAY AS DIRECTED 90 tablet 1    ferrous gluconate (Fergon) 324 (38 Fe) mg tablet Take 1 tablet (38 mg of iron) by mouth once daily with breakfast. 90 tablet 1    ferrous sulfate 325 (65 Fe) MG tablet Take 1 tablet (325 mg) by mouth early in the morning..      fish oil concentrate (Omega-3) 120-180 mg capsule Take 1 capsule (1 g) by mouth once daily.      FreeStyle Kalpana 3 [place] misc Use as instructed 1 each 0    FreeStyle Kalpana 3 Sensor device Change sensor every 14 days as directed 2 each 11    furosemide (Lasix) 40 mg tablet TAKE 1 TABLET BY MOUTH EVERY DAY 90 tablet 3    gabapentin (Neurontin) 300 mg capsule TAKE 2 CAPSULES BY MOUTH AT BEDTIME 180 capsule 3    glimepiride (Amaryl) 4 mg tablet Take 1 tablet (4 mg) by mouth 2 times a day before meals. 180 tablet 1    glucosamine-chondroit-vit C-Mn 500-400 mg capsule Take by mouth.      hydroCHLOROthiazide (HYDRODiuril) 25 mg tablet TAKE 1 TABLET BY MOUTH EVERY DAY 90 tablet 0    lansoprazole (Prevacid) 15 mg DR capsule Take 1 capsule (15 mg) by mouth once daily.      losartan (Cozaar) 100 mg tablet TAKE 1 TABLET BY MOUTH EVERY DAY 90 tablet 3    metFORMIN (Glucophage) 1,000 mg tablet TAKE 1 TABLET EVERY 12 HOURS DAILY 180 tablet 0    multivitamin with folic acid (One Daily Multivitamin) 400 mcg tablet Take 1 tablet by mouth once daily.      OneTouch Ultra Test strip USE AS DIRECTED 3 TIMES A  strip 11    OneTouch Ultra Test strip 1 each by Does not apply route 3 times a day. 100  each 3    rosuvastatin (Crestor) 20 mg tablet TAKE 1 TABLET BY MOUTH EVERY DAY 90 tablet 3    saw palmetto 450 mg capsule Take 1 capsule (450 mg) by mouth once daily.      semaglutide (Ozempic) 2 mg/dose (8 mg/3 mL) pen injector Inject 2 mg under the skin 1 (one) time per week. 3 mL 1    triamcinolone (Kenalog) 0.1 % cream Apply topically 2 times a day.      turmeric-turmeric root extract 450-50 mg capsule Take 1 capsule by mouth once daily.      walker misc 1 each once daily. Rolator walker, he has ambulatory dysfunction 1 each 0    zinc gluconate 50 mg tablet Take 1 tablet (50 mg) by mouth once daily.      [DISCONTINUED] semaglutide (Ozempic) 1 mg/dose (4 mg/3 mL) pen injector Inject 1 mg under the skin 1 (one) time per week. 3 mL 2     No current facility-administered medications on file prior to visit.       Labs: (past 26 weeks)  Recent Results (from the past 26 weeks)   Transthoracic Echo (TTE) Complete    Collection Time: 10/21/24 11:24 AM   Result Value Ref Range    LVOT diam 2.35 cm    LV Biplane EF 58 %    MV E/A ratio 0.79     MV avg E/e' ratio 7.39     Tricuspid annular plane systolic excursion 2.1 cm    AV mn grad 2.6 mmHg    LA vol index A/L 19.6 ml/m2    AV pk racheal 1.04 m/s    LV EF 58 %    RV free wall pk S' 10.45 cm/s    Aortic Valve Area by Continuity of VTI 3.20 cm2    Aortic Valve Area by Continuity of Peak Velocity 3.23 cm2    AV pk grad 4.3 mmHg    LV A4C EF 57.7    Magnesium    Collection Time: 10/23/24  7:35 AM   Result Value Ref Range    Magnesium 1.76 1.60 - 2.40 mg/dL   CBC and Auto Differential    Collection Time: 10/23/24  7:35 AM   Result Value Ref Range    WBC 6.4 4.4 - 11.3 x10*3/uL    nRBC 0.0 0.0 - 0.0 /100 WBCs    RBC 4.48 (L) 4.50 - 5.90 x10*6/uL    Hemoglobin 13.6 13.5 - 17.5 g/dL    Hematocrit 41.8 41.0 - 52.0 %    MCV 93 80 - 100 fL    MCH 30.4 26.0 - 34.0 pg    MCHC 32.5 32.0 - 36.0 g/dL    RDW 14.6 (H) 11.5 - 14.5 %    Platelets 191 150 - 450 x10*3/uL    Neutrophils % 55.9  40.0 - 80.0 %    Immature Granulocytes %, Automated 0.6 0.0 - 0.9 %    Lymphocytes % 26.2 13.0 - 44.0 %    Monocytes % 10.5 2.0 - 10.0 %    Eosinophils % 6.0 0.0 - 6.0 %    Basophils % 0.8 0.0 - 2.0 %    Neutrophils Absolute 3.57 1.60 - 5.50 x10*3/uL    Immature Granulocytes Absolute, Automated 0.04 0.00 - 0.50 x10*3/uL    Lymphocytes Absolute 1.67 0.80 - 3.00 x10*3/uL    Monocytes Absolute 0.67 0.05 - 0.80 x10*3/uL    Eosinophils Absolute 0.38 0.00 - 0.40 x10*3/uL    Basophils Absolute 0.05 0.00 - 0.10 x10*3/uL   Comprehensive Metabolic Panel    Collection Time: 10/23/24  7:35 AM   Result Value Ref Range    Glucose 174 (H) 74 - 99 mg/dL    Sodium 141 136 - 145 mmol/L    Potassium 3.9 3.5 - 5.3 mmol/L    Chloride 103 98 - 107 mmol/L    Bicarbonate 27 21 - 32 mmol/L    Anion Gap 15 10 - 20 mmol/L    Urea Nitrogen 26 (H) 6 - 23 mg/dL    Creatinine 1.34 (H) 0.50 - 1.30 mg/dL    eGFR 56 (L) >60 mL/min/1.73m*2    Calcium 10.3 8.6 - 10.3 mg/dL    Albumin 4.4 3.4 - 5.0 g/dL    Alkaline Phosphatase 52 33 - 136 U/L    Total Protein 6.9 6.4 - 8.2 g/dL    AST 32 9 - 39 U/L    Bilirubin, Total 0.5 0.0 - 1.2 mg/dL    ALT 42 10 - 52 U/L   Hemoglobin A1C    Collection Time: 10/23/24  7:35 AM   Result Value Ref Range    Hemoglobin A1C 7.6 (H) See comment %    Estimated Average Glucose 171 Not Established mg/dL   Lipid Panel    Collection Time: 10/23/24  7:35 AM   Result Value Ref Range    Cholesterol 132 0 - 199 mg/dL    HDL-Cholesterol 35.5 mg/dL    Cholesterol/HDL Ratio 3.7     LDL Calculated 62 <=99 mg/dL    VLDL 34 0 - 40 mg/dL    Triglycerides 171 (H) 0 - 149 mg/dL    Non HDL Cholesterol 97 0 - 149 mg/dL   Iron and TIBC    Collection Time: 10/23/24  7:35 AM   Result Value Ref Range    Iron 64 35 - 150 ug/dL    UIBC 382 (H) 110 - 370 ug/dL    TIBC 446 (H) 240 - 445 ug/dL    % Saturation 14 (L) 25 - 45 %   Uric acid    Collection Time: 10/23/24  7:35 AM   Result Value Ref Range    Uric Acid 6.5 4.0 - 7.5 mg/dL    Albumin-Creatinine Ratio, Urine Random    Collection Time: 10/23/24  7:56 AM   Result Value Ref Range    Albumin, Urine Random 15.7 Not established mg/L    Creatinine, Urine Random 87.0 20.0 - 370.0 mg/dL    Albumin/Creatinine Ratio 18.0 <30.0 ug/mg Creat   Hemoglobin A1c    Collection Time: 03/05/25  7:05 AM   Result Value Ref Range    HEMOGLOBIN A1c 9.0 (H) <5.7 % of total Hgb    eAG (mg/dL) 212 mg/dL    eAG (mmol/L) 11.7 mmol/L   Albumin-Creatinine Ratio, Urine Random    Collection Time: 03/05/25  7:05 AM   Result Value Ref Range    CREATININE, RANDOM URINE 65 20 - 320 mg/dL    ALBUMIN, URINE 2.3 See Note: mg/dL    ALBUMIN/CREATININE RATIO, RANDOM URINE 35 (H) <30 mg/g creat   Lipid Panel    Collection Time: 03/24/25  7:37 AM   Result Value Ref Range    CHOLESTEROL, TOTAL 148 <200 mg/dL    HDL CHOLESTEROL 36 (L) > OR = 40 mg/dL    TRIGLYCERIDES 156 (H) <150 mg/dL    LDL-CHOLESTEROL 87 mg/dL (calc)    CHOL/HDLC RATIO 4.1 <5.0 (calc)    NON HDL CHOLESTEROL 112 <130 mg/dL (calc)   Magnesium    Collection Time: 03/24/25  7:37 AM   Result Value Ref Range    MAGNESIUM 2.0 1.5 - 2.5 mg/dL   Comprehensive Metabolic Panel    Collection Time: 03/24/25  7:37 AM   Result Value Ref Range    GLUCOSE 214 (H) 65 - 99 mg/dL    UREA NITROGEN (BUN) 29 (H) 7 - 25 mg/dL    CREATININE 1.18 0.70 - 1.28 mg/dL    EGFR 66 > OR = 60 mL/min/1.73m2    SODIUM 142 135 - 146 mmol/L    POTASSIUM 3.9 3.5 - 5.3 mmol/L    CHLORIDE 100 98 - 110 mmol/L    CARBON DIOXIDE 29 20 - 32 mmol/L    ELECTROLYTE BALANCE 13 7 - 17 mmol/L (calc)    CALCIUM 10.9 (H) 8.6 - 10.3 mg/dL    PROTEIN, TOTAL 7.4 6.1 - 8.1 g/dL    ALBUMIN 4.7 3.6 - 5.1 g/dL    BILIRUBIN, TOTAL 0.5 0.2 - 1.2 mg/dL    ALKALINE PHOSPHATASE 67 35 - 144 U/L    AST 43 (H) 10 - 35 U/L    ALT 72 (H) 9 - 46 U/L   CBC    Collection Time: 03/24/25  7:37 AM   Result Value Ref Range    WHITE BLOOD CELL COUNT 6.7 3.8 - 10.8 Thousand/uL    RED BLOOD CELL COUNT 5.12 4.20 - 5.80 Million/uL    HEMOGLOBIN  15.3 13.2 - 17.1 g/dL    HEMATOCRIT 47.0 38.5 - 50.0 %    MCV 91.8 80.0 - 100.0 fL    MCH 29.9 27.0 - 33.0 pg    MCHC 32.6 32.0 - 36.0 g/dL    RDW 13.2 11.0 - 15.0 %    PLATELET COUNT 216 140 - 400 Thousand/uL    MPV 10.7 7.5 - 12.5 fL       ECG  No results found. However, due to the size of the patient record, not all encounters were searched. Please check Results Review for a complete set of results.    Echocardiogram  No results found for this or any previous visit from the past 1095 days.      CV Studies:  EKG:No results found for this or any previous visit (from the past 4464 hours).  Echocardiogram:   Echocardiogram     [place], [place] [address]  Phone [phone] Fax [phone]    TRANSTHORACIC ECHOCARDIOGRAM REPORT      Patient Name:     MAHAMED Batres Physician:   [id number] Sol FELIX MD  Study Date:       8/23/2021      Referring Physician: [id number] TAY  MRN/PID:          [id number]       PCP:  Accession/Order#: [id number]   Department Location: [facility] Echo Lab  YOB: 1950       Fellow:  Gender:           M              Nurse:               Robert Lemos RN  Admit Date:                      Sonographer:         Rossana Kelly [facility]  Admission Status: Outpatient     Additional Staff:  Height:           190.50 cm      CC Report to:  Weight:           119.30 kg      Study Type:          Echocardiogram  BSA:              2.47 m2  Blood Pressure: 105 /68 mmHg    Diagnosis/ICD: I25.10-Atherosclerotic heart disease of native coronary artery  without angina pectoris  Indication:    Dyspnea on Exertion  Procedure/CPT: Echo Complete w Full [id number]    Patient History:  Pertinent History: Stent, HTN, CM, ASHD, DM.    Study Detail: The following Echo studies were performed: 2D, M-Mode, Doppler and  color flow. Technically challenging study due to body habitus.  Optison used as a  contrast agent for endocardial border  definition. Total contrast used for this procedure was 5 mL via IV  push.      PHYSICIAN INTERPRETATION:  Left Ventricle: The left ventricular systolic function is normal, with an estimated ejection fraction of 60-65%. There are no regional wall motion abnormalities. The left ventricular cavity size is normal. Spectral Doppler shows a normal pattern of left ventricular diastolic filling.  Left Atrium: The left atrium is normal in size.  Right Ventricle: The right ventricle is normal in size. There is low normal right ventricular systolic function.  Right Atrium: The right atrium is normal in size.  Aortic Valve: The aortic valve is probably trileaflet. There is no evidence of aortic valve regurgitation. The peak instantaneous gradient of the aortic valve is 6.4 mmHg. The mean gradient of the aortic valve is 3.0 mmHg.  Mitral Valve: The mitral valve is normal in structure. There is trace mitral valve regurgitation.  Tricuspid Valve: The tricuspid valve is structurally normal. No evidence of tricuspid regurgitation.  Pulmonic Valve: The pulmonic valve is structurally normal. There is physiologic pulmonic valve regurgitation.  Pericardium: There is no pericardial effusion noted.  Aorta: The aortic root is abnormal. There is moderate dilatation of the ascending aorta. Based of AD/height < 2.43 cm/m indicated lower risk of dissection.      CONCLUSIONS:  1. The left ventricular systolic function is normal with a 60-65% estimated ejection fraction.  2. There is moderate dilatation of the ascending aorta.    QUANTITATIVE DATA SUMMARY:  2D MEASUREMENTS:  Normal Ranges:  Ao Root d:     3.90 cm   (2.0-3.7cm)  LAs:           3.20 cm   (2.7-4.0cm)  IVSd:          1.10 cm   (0.6-1.1cm)  LVPWd:         1.10 cm   (0.6-1.1cm)  LVIDd:         4.70 cm   (3.9-5.9cm)  LVIDs:         3.40 cm  LV Mass Index: 76.1 g/m2  LV % FS        27.7 %    LA VOLUME:  Normal Ranges:  LA Vol A4C:        34.6 ml     (22+/-6mL/m2)  LA Vol A2C:        46.7 ml  LA Vol BP:         42.8 ml  LA Vol Index A4C:  14.0ml/m2  LA Vol Index A2C:  18.9 ml/m2  LA Vol Index BP:   17.4 ml/m2  LA Area A4C:       15.5 cm2  LA Area A2C:       16.9 cm2  LA Major Axis A4C: 5.9 cm  LA Major Axis A2C: 5.2 cm  LA Volume Index:   16.0 ml/m2  LA Vol A4C:        31.0 ml  LA Vol A2C:        44.0 ml    RA VOLUME BY A/L METHOD:  Normal Ranges:  RA Area A4C: 17.8 cm2    AORTA MEASUREMENTS:  Normal Ranges:  Ao Sinus, d: 3.90 cm (2.1-3.5cm)  Ao STJ, d:   3.00 cm (1.7-3.4cm)  Asc Ao, d:   4.30 cm (2.1-3.4cm)    LV SYSTOLIC FUNCTION BY 2D PLANIMETRY (MOD):  Normal Ranges:  EF-A4C View: 58.4 % (>55%)  EF-A2C View: 61.8 %  EF-Biplane:  59.9 %    LV DIASTOLIC FUNCTION:  Normal Ranges:  MV Peak E:        0.55 m/s    (0.7-1.2 m/s)  MV Peak A:        0.81 m/s    (0.42-0.7 m/s)  E/A Ratio:        0.67        (1.0-2.2)  MV e'             0.07 m/s    (>8.0)  MV lateral e'     0.08 m/s  MV medial e'      0.06 m/s  MV A Dur:         130.00 msec  E/e' Ratio:       7.83        (<8.0)  PulmV A Revs Dur: 141.00 msec    MITRAL VALVE:  Normal Ranges:  MV DT: 250 msec (150-240msec)    AORTIC VALVE:  Normal Ranges:  AoV Vmax:                1.26 m/s (<1.7m/s)  AoV Peak P.4 mmHg (<20mmHg)  AoV Mean PG:             3.0 mmHg (1.7-11.5mmHg)  LVOT Max Hair:            0.98 m/s (<1.1m/s)  AoV VTI:                 22.20 cm (18-25cm)  LVOT VTI:                16.70 cm  LVOT Diameter:           2.40 cm  (1.8-2.4cm)  AoV Area, VTI:           3.40 cm2 (2.5-5.5cm2)  AoV Area,Vmax:           3.51 cm2 (2.5-4.5cm2)  AoV Dimensionless Index: 0.75    RIGHT VENTRICLE:  RV 1   3.9 cm  RV 2   3.7 cm  RV 3   6.6 cm  TAPSE: 19.0 mm  RV s'  0.13 m/s    TRICUSPID VALVE/RVSP:  Normal Ranges:  Peak TR Velocity: 1.97 m/s  RV Syst Pressure: 20.5 mmHg (< 30mmHg)  TV E Vmax:        0.29 m/s  (0.3-0.7m/s)  TV A Vmax:        0.34 m/s  IVC Diam:         2.08 cm    Pulmonary Veins:  PulmV A Revs Dur:  141.00 [facility] [place]      [id number] Sol Gutierrez MD  Electronically signed on 8/24/2021 at 5:11:07 PM         Final     Stress Testing BLAINE([id number]:1:1825): No results found for this or any previous visit from the past 1825 days.    Cardiac Catheterization: No results found for this or any previous visit from the past 1825 days.  No results found for this or any previous visit from the past 3650 days.     Cardiac Scoring: No results found for this or any previous visit from the past 1825 days.    AAA : No results found for this or any previous visit from the past 1825 days.    OTHER: No results found for this or any previous visit from the past 1825 days.    LAST IMAGING RESULTS  XR lumbar spine complete 4+ views  Narrative: Interpreted By:  Joe Carreno,   STUDY:  XR LUMBAR SPINE COMPLETE 4+ VIEWS;  1/6/2025 10:50 am      INDICATION:  Signs/Symptoms:low back pain, history of discsectomy in the past.      COMPARISON:  10/07/2010      ACCESSION NUMBER(S):  [id number]      ORDERING CLINICIAN:  SHAY PRABHAKAR      FINDINGS:  Alignment:  No significant scoliosis.      Disc levels:  Moderate spondylosis. Namely there is moderate  narrowing of the T12-L1 disc interspace with moderate-to-marked  marginal osteophyte formation. There is fairly marked marginal  osteophyte formation anteriorly at the L1-2 level. There are mild  retrolisthesis at the L3-4 and L2-3 levels with mild disc space  narrowing. There is marked narrowing of the L5-S1 disc interspace  with mild vacuum phenomena, and moderate anterior marginal osteophyte  formation. There is mild hypertrophy of the L4-5 and L5-S1 apophyseal  joints. Spondylosis described has progressed mildly from the previous  exam.      Bony structures:  The S1 vertebral body is transitional, and this is  presumed for nomenclature. The bony structures otherwise are intact.      Other findings:  Moderate atherosclerotic calcifications are noted  predominantly at the aorta and iliac  system. This has progressed from  the prior exam. An approximate 6 mm radiodensity overlies the lower  pole of the right renal shadow similar to previous examination and  most likely due to nephrolithiasis.      Impression: Moderate spondylosis, mildly progressed from the prior exam.      Signed by: Joe Carreno 1/8/2025 11:36 AM  Dictation workstation:   [id number]      Problem List Items Addressed This Visit       Obstructive sleep apnea syndrome in adult    PAF (paroxysmal atrial fibrillation) (Multi)    Poorly controlled type 2 diabetes mellitus (Multi)    Obesity (BMI 30-39.9)    Hyperlipidemia    ASHD (arteriosclerotic heart disease) - Primary    Benign essential hypertension    On apixaban therapy    Thoracic ascending aortic aneurysm    Heart failure with recovered ejection fraction (HFrecEF)    CKD (chronic kidney disease)          Denzel Nunez DO, FACC, FACOI

## 2025-03-31 ENCOUNTER — CONSULT (OUTPATIENT)
Dept: PHYSICAL MEDICINE AND REHAB | Facility: CLINIC | Age: 75
End: 2025-03-31
Payer: MEDICARE

## 2025-03-31 VITALS
TEMPERATURE: 97.7 F | HEART RATE: 94 BPM | RESPIRATION RATE: 20 BRPM | DIASTOLIC BLOOD PRESSURE: 68 MMHG | SYSTOLIC BLOOD PRESSURE: 107 MMHG

## 2025-03-31 DIAGNOSIS — G89.29 CHRONIC BILATERAL LOW BACK PAIN WITHOUT SCIATICA: Primary | ICD-10-CM

## 2025-03-31 DIAGNOSIS — M54.50 CHRONIC BILATERAL LOW BACK PAIN WITHOUT SCIATICA: Primary | ICD-10-CM

## 2025-03-31 DIAGNOSIS — M53.3 SACROILIAC JOINT DYSFUNCTION OF RIGHT SIDE: ICD-10-CM

## 2025-03-31 DIAGNOSIS — M54.50 LOW BACK PAIN, UNSPECIFIED BACK PAIN LATERALITY, UNSPECIFIED CHRONICITY, UNSPECIFIED WHETHER SCIATICA PRESENT: ICD-10-CM

## 2025-03-31 PROCEDURE — 99213 OFFICE O/P EST LOW 20 MIN: CPT | Mod: GC | Performed by: PHYSICAL MEDICINE & REHABILITATION

## 2025-03-31 PROCEDURE — 99203 OFFICE O/P NEW LOW 30 MIN: CPT | Performed by: PHYSICAL MEDICINE & REHABILITATION

## 2025-03-31 PROCEDURE — G2211 COMPLEX E/M VISIT ADD ON: HCPCS | Performed by: PHYSICAL MEDICINE & REHABILITATION

## 2025-03-31 ASSESSMENT — PAIN SCALES - GENERAL: PAINLEVEL_OUTOF10: 4

## 2025-03-31 NOTE — PROGRESS NOTES
Ref by PCP Dr. HESS for lower back pain and right hip pain.  Had recent xrays.  No recent physical therapy.

## 2025-03-31 NOTE — PATIENT INSTRUCTIONS
- Referral sent to physical therapy to focus on strengthening the muscle of your low back   - Try an over the counter Sacroiliac Joint Belt  - Can also try over the counter lidocaine cream or roll-on

## 2025-03-31 NOTE — PROGRESS NOTES
Physical Medicine and Rehabilitation MSK Consult  03/31/25  Referred by PCP Dr. Hong     Chief Complaint:  Low back pain     HPI:  Isauro Valdez is a 74 y.o. male with a past medical history of A-fib (on Eliquis), HLD, CAD, CHF, DM II (w/ diabetic neuropathy), GERD, and CKD Stage III who presents to the clinic today for evaluation of low back pain.    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 No current HEP  Medications taken to date for this complaint include the following:   Gabapentin 600mg QHS, Acetaminophen  Prior injections: None recently    Prior surgical intervention: Prior microdiscectomy approximately 15 years ago     IMAGING:  Reviewed 3/2025 w patient and personally  === 01/06/25 ===    XR LUMBAR SPINE COMPLETE 4+ VIEWS    - Impression -  Moderate spondylosis, mildly progressed from the prior exam.     Past Medical History:   Diagnosis Date    Cellulitis of left lower limb 09/06/2019    Cellulitis of left lower extremity    Corns and callosities 09/06/2019    Callus of foot    Coronary angioplasty status 09/06/2019    History of PTCA 1    Old myocardial infarction 09/06/2019    History of myocardial infarction    Other forms of dyspnea 02/19/2020    Dyspnea on exertion    Personal history of diabetic foot ulcer 09/06/2019    History of diabetic ulcer of foot    Personal history of other diseases of the circulatory system 10/07/2019    History of hypertension    Personal history of other drug therapy 09/06/2019    History of influenza  vaccination    Personal history of other drug therapy 09/06/2019    History of hepatitis B vaccination    Personal history of other drug therapy 09/06/2019    History of pneumococcal vaccination    Personal history of other endocrine, nutritional and metabolic disease     History of type 2 diabetes mellitus    Personal history of other specified conditions 09/06/2019    History of persistent cough    Personal history of urinary calculi     History of kidney stones    Personal history of urinary calculi 09/06/2019    History of nephrolithiasis    Rash and other nonspecific skin eruption 12/27/2018    Rash of groin    Type 2 diabetes mellitus with diabetic nephropathy 10/07/2019    Type 2 diabetes mellitus with diabetic nephropathy        Past Surgical History:   Procedure Laterality Date    ANKLE SURGERY  05/17/2017    Ankle Surgery    CORONARY ANGIOPLASTY  05/17/2017    PTCA    FOOT SURGERY  06/12/2018    Foot Surgery    KNEE ARTHROSCOPY W/ DEBRIDEMENT  05/17/2017    Arthroscopy Knee    LITHOTRIPSY  05/17/2017    Renal Lithotripsy    LUMBAR LAMINECTOMY  05/17/2017    Laminectomy Lumbar    OTHER SURGICAL HISTORY  05/17/2017    Biopsy Of Liver    OTHER SURGICAL HISTORY  09/01/2022    Tonsillectomy with adenoidectomy        Patient Active Problem List    Diagnosis Date Noted    Heart failure with recovered ejection fraction (HFrecEF) 03/28/2025    CKD (chronic kidney disease) 03/28/2025    On apixaban therapy 09/18/2024    Thoracic ascending aortic aneurysm 09/18/2024    Stage 3a chronic kidney disease (Multi) 08/19/2024    Functional gait abnormality 03/04/2024    Poor balance 03/04/2024    Hemorrhoids 02/14/2024    Polyp of colon 02/14/2024    Hyperuricemia 01/09/2024    Gouty arthritis of left hand 01/09/2024    Gouty arthritis of toe 12/18/2023    Routine general medical examination at health care facility 06/19/2023    ACE-inhibitor cough 06/06/2023    Dysuria 06/06/2023    Nodule in muscle 06/06/2023    Unknown  cause of injury 03/30/2023    Obstructive sleep apnea syndrome in adult 03/30/2023    PAF (paroxysmal atrial fibrillation) (Multi) 03/30/2023    Personal history of colonic polyps 03/30/2023    Poorly controlled type 2 diabetes mellitus (Multi) 03/30/2023    Type 2 diabetes mellitus 03/30/2023    Presence of coronary angioplasty implant and graft 03/30/2023    Primary osteoarthritis of left hand 03/30/2023    Renal mass 03/30/2023    Restless legs syndrome 03/30/2023    Trigger middle finger of left hand 03/30/2023    Swelling of left hand 03/30/2023    Seasonal allergies 03/30/2023    Obesity (BMI 30-39.9) 03/30/2023    Low back pain 03/30/2023    Joint swelling 03/30/2023    Fatty liver 03/30/2023    History of cardiomyopathy 03/30/2023    Hypercalcemia 03/30/2023    Hyperlipidemia 03/30/2023    Anemia 03/30/2023    ASHD (arteriosclerotic heart disease) 03/30/2023    Hypertrophy of inter-atrial septum 03/30/2023    Atopic dermatitis 03/30/2023    Benign essential hypertension 03/30/2023    BPH (benign prostatic hyperplasia) 03/30/2023    Cardiac disease 03/30/2023    Cellulitis of hand, left 03/30/2023    Chronic combined systolic and diastolic CHF (congestive heart failure) 03/30/2023    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 (Multi) 03/30/2023    Chronic GERD 03/30/2023    Disorder of iron metabolism 03/30/2023    Diverticulosis of large intestine 06/28/2022        Family History   Problem Relation Name Age of Onset    Heart attack Other          Current Outpatient Medications   Medication Sig Dispense Refill    allopurinol (Zyloprim) 100 mg tablet TAKE 2 TABLETS BY MOUTH ONCE DAILY. 180 tablet 1    amLODIPine (Norvasc) 2.5 mg tablet TAKE 1 TABLET BY MOUTH EVERYDAY AT BEDTIME 90 tablet 1    apixaban (Eliquis) 5 mg tablet Take 1 tablet (5 mg) by mouth 2 times a day. 180 tablet 3    ascorbic acid (Vitamin C) 500 mg tablet Take by mouth.      aspirin 81 mg EC tablet Take by mouth.       blood-glucose sensor (FreeStyle Kalpana 3 Plus Sensor) device For continuous glucose monitoring.  [place] every 15 days 2 each 11    carvedilol (Coreg) 25 mg tablet TAKE 1 TABLET BY MOUTH TWICE A DAY WITH MORNING AND EVENING MEAL 180 tablet 1    cloNIDine (Catapres) 0.1 mg tablet TAKE 1 TABLET BY MOUTH TWICE A  tablet 3    colchicine 0.6 mg tablet Take 1 tablet (0.6 mg) by mouth if needed for muscle/joint pain (gout). 60 tablet 0    colestipol (Colestid) 1 gram tablet TAKE 2 TABLETS BY MOUTH TWICE A  tablet 5    empagliflozin (Jardiance) 25 mg Take 1 tablet (25 mg) by mouth once daily. 90 tablet 3    ezetimibe (Zetia) 10 mg tablet Take 1 tablet (10 mg) by mouth once daily. 90 tablet 3    famotidine (Pepcid) 20 mg tablet TAKE 1 TABLET BY MOUTH EVERY DAY AS DIRECTED 90 tablet 1    ferrous gluconate (Fergon) 324 (38 Fe) mg tablet Take 1 tablet (38 mg of iron) by mouth once daily with breakfast. 90 tablet 1    ferrous sulfate 325 (65 Fe) MG tablet Take 1 tablet (325 mg) by mouth early in the morning..      fish oil concentrate (Omega-3) 120-180 mg capsule Take 1 capsule (1 g) by mouth once daily.      FreeStyle Kalpana 3 [place] misc Use as instructed 1 each 0    FreeStyle Kalpana 3 Sensor device Change sensor every 14 days as directed 2 each 11    furosemide (Lasix) 40 mg tablet TAKE 1 TABLET BY MOUTH EVERY DAY 90 tablet 3    gabapentin (Neurontin) 300 mg capsule TAKE 2 CAPSULES BY MOUTH AT BEDTIME 180 capsule 3    glimepiride (Amaryl) 4 mg tablet Take 1 tablet (4 mg) by mouth 2 times a day before meals. 180 tablet 1    glucosamine-chondroit-vit C-Mn 500-400 mg capsule Take by mouth.      hydroCHLOROthiazide (HYDRODiuril) 25 mg tablet TAKE 1 TABLET BY MOUTH EVERY DAY 90 tablet 0    lansoprazole (Prevacid) 15 mg DR capsule Take 1 capsule (15 mg) by mouth once daily.      losartan (Cozaar) 100 mg tablet TAKE 1 TABLET BY MOUTH EVERY DAY 90 tablet 3    metFORMIN (Glucophage) 1,000 mg tablet TAKE 1 TABLET EVERY 12  HOURS DAILY 180 tablet 0    multivitamin with folic acid (One Daily Multivitamin) 400 mcg tablet Take 1 tablet by mouth once daily.      OneTouch Ultra Test strip USE AS DIRECTED 3 TIMES A  strip 11    OneTouch Ultra Test strip 1 each by Does not apply route 3 times a day. 100 each 3    rosuvastatin (Crestor) 20 mg tablet TAKE 1 TABLET BY MOUTH EVERY DAY 90 tablet 3    saw palmetto 450 mg capsule Take 1 capsule (450 mg) by mouth once daily.      semaglutide (Ozempic) 2 mg/dose (8 mg/3 mL) pen injector Inject 2 mg under the skin 1 (one) time per week. 3 mL 1    triamcinolone (Kenalog) 0.1 % cream Apply topically 2 times a day.      turmeric-turmeric root extract 450-50 mg capsule Take 1 capsule by mouth once daily.      walker misc 1 each once daily. Rolator walker, he has ambulatory dysfunction 1 each 0    zinc gluconate 50 mg tablet Take 1 tablet (50 mg) by mouth once daily.       No current facility-administered medications for this visit.        Allergies   Allergen Reactions    Adhesive Tape-Silicones Unknown    Niacin Hives and Unknown    Nystatin Other    Penicillins Hives and Unknown        Social History     Socioeconomic History    Marital status:    Tobacco Use    Smoking status: Never    Smokeless tobacco: Former   Vaping Use    Vaping status: Never Used   Substance and Sexual Activity    Alcohol use: Not Currently    Drug use: Never     Social Drivers of Health     Food Insecurity: No Food Insecurity (1/2/2025)    Hunger Vital Sign     Worried About Running Out of Food in the Last Year: Never true     Ran Out of Food in the Last Year: Never true        Review of Systems:  Constitutional:  Denies fever or chills, malaise, weight changes.   Eyes:  Denies change in visual acuity   HENT:  Denies nasal congestion or sore throat   Respiratory:  Denies cough or shortness of breath   Cardiovascular:  Denies chest pain or edema   GI:  Denies abdominal pain, nausea, vomiting, bloody stools or  diarrhea   :  Denies dysuria   Integument:  Denies rash   Neurologic:  As per HPI  MSK: Per above HPI  Endocrine:  Denies polyuria or polydipsia   Lymphatic:  Denies swollen glands   Psychiatric:  Denies depression or anxiety            PHYSICAL EXAM:  /68 (BP Location: Left arm, Patient Position: Sitting)   Pulse 94   Temp 36.5 °C (97.7 °F)   Resp 20     General:  NAD, well developed, male    Psychiatric: appropriate mood & affect.   Cardiovascular:  Normal pedal pulses; no LE edema.  Respiratory:  Normal rate; unlabored breathing.  Skin:  Intact; no erythema; no ecchymosis or rash.  Lymphatic:  No lymphadenopathy or lymphedema.  NEURO:  Alert and appropriate. Speech fluent, conversing appropriately.   Motor:    Rt: HF 4/5, KE 5/5, KF 5/5, DF 5/5, EHL 4/5, PF 5/5.    Lt: HF 4/5, KE 5/5, KF 5/5, DF 5/5, EHL 5/5, PF 5/5.  Sensation:     Light touch: diminished over dorsum surface of bilateral feet  Reflexes:     Right:  patellar 1+, achilles 1+,     Left: patellar 1+, achilles 1+,     Negative Angel's  Gait: Normal, narrow based gait. Utilizing straight cane   MSK:  Inspection reveals no evidence of a pelvic obliqity   Spinal range of motion: moderate axial low back pain with lumbar extension, full otherwise  There is tenderness over the right lumbar paraspinals and right gluteal musculature  Special tests:    Straight leg raise: negative    Slump test: negative    Facet loading: positive R>L    MAURIZIO's: positive on right over SI joint     IMPRESSION:    Isauro Valdez is a 74 y.o. male with a past medical history of A-fib (on Eliquis), HLD, CAD, CHF, DM II (w/ diabetic neuropathy), GERD, and CKD Stage III who presents to the clinic today for evaluation of low back pain. Physical exam is reassuring for lack of neurologic compromise. Pain is likely multifactorial with component of facet arthropathy, lumbar spondylosis, Right SI joint dysfunction, and gluteal musculature tendonitis.     - Patient  Education: Extensive time was spent educating the patient on relevant anatomy, clinical findings and imaging, as well as discussing the potential diagnoses as discussed above.     Referral to physical therapy with focus on muscle strengthening and obtaining a home exercise program  Lumbar exercises and stretches provided today for patient to get started  Can also try over the counter SI joint belt, information provided  Can try over the counter Lidocaine cream/roll on medication  Consider advanced images in the future if considering interventional procedures  On Eliquis, will avoid NSAIDs  CKD Stage 3, last GFR 66. Avoid nephrotoxic medications  Follow-up 2 months    The patient expressed understanding and agreement with the assessment and plan. Patient encouraged to contact us should they have any questions, concerns, or any changes in symptoms.     Thank you for allowing me to participate in the care of your patient.    Shiloh Michel DO   PM&R PGY4    Patient seen and examined with Dr. Diego Small who agrees with assessment and plan.     ** Dictated with voice recognition software, please forgive any errors in grammar and/or spelling **       Roma Small MD     Division of PM&R

## 2025-04-01 ENCOUNTER — PHARMACY VISIT (OUTPATIENT)
Dept: PHARMACY | Facility: CLINIC | Age: 75
End: 2025-04-01
Payer: MEDICARE

## 2025-04-09 ENCOUNTER — OFFICE VISIT (OUTPATIENT)
Dept: CARDIOLOGY | Facility: HOSPITAL | Age: 75
End: 2025-04-09
Payer: MEDICARE

## 2025-04-09 VITALS
SYSTOLIC BLOOD PRESSURE: 120 MMHG | HEART RATE: 87 BPM | WEIGHT: 256 LBS | HEIGHT: 75 IN | DIASTOLIC BLOOD PRESSURE: 66 MMHG | BODY MASS INDEX: 31.83 KG/M2

## 2025-04-09 DIAGNOSIS — G47.33 OBSTRUCTIVE SLEEP APNEA SYNDROME IN ADULT: ICD-10-CM

## 2025-04-09 DIAGNOSIS — I71.21 ANEURYSM OF ASCENDING AORTA WITHOUT RUPTURE: ICD-10-CM

## 2025-04-09 DIAGNOSIS — N18.31 STAGE 3A CHRONIC KIDNEY DISEASE (MULTI): ICD-10-CM

## 2025-04-09 DIAGNOSIS — E66.9 OBESITY (BMI 30-39.9): ICD-10-CM

## 2025-04-09 DIAGNOSIS — E78.5 HYPERLIPIDEMIA, UNSPECIFIED: ICD-10-CM

## 2025-04-09 DIAGNOSIS — E11.65 POORLY CONTROLLED TYPE 2 DIABETES MELLITUS (MULTI): ICD-10-CM

## 2025-04-09 DIAGNOSIS — E78.2 MIXED HYPERLIPIDEMIA: ICD-10-CM

## 2025-04-09 DIAGNOSIS — E11.00 TYPE 2 DIABETES MELLITUS WITH HYPEROSMOLARITY WITHOUT COMA, WITHOUT LONG-TERM CURRENT USE OF INSULIN (MULTI): ICD-10-CM

## 2025-04-09 DIAGNOSIS — Z79.01 ON APIXABAN THERAPY: ICD-10-CM

## 2025-04-09 DIAGNOSIS — I10 BENIGN ESSENTIAL HYPERTENSION: ICD-10-CM

## 2025-04-09 DIAGNOSIS — Z86.79 HISTORY OF CARDIOMYOPATHY: ICD-10-CM

## 2025-04-09 DIAGNOSIS — I50.32 HEART FAILURE WITH RECOVERED EJECTION FRACTION (HFRECEF): ICD-10-CM

## 2025-04-09 DIAGNOSIS — E78.00 PURE HYPERCHOLESTEROLEMIA: ICD-10-CM

## 2025-04-09 DIAGNOSIS — I48.0 PAF (PAROXYSMAL ATRIAL FIBRILLATION) (MULTI): ICD-10-CM

## 2025-04-09 DIAGNOSIS — N18.9 CHRONIC KIDNEY DISEASE, UNSPECIFIED CKD STAGE: ICD-10-CM

## 2025-04-09 DIAGNOSIS — I25.10 ASHD (ARTERIOSCLEROTIC HEART DISEASE): Primary | ICD-10-CM

## 2025-04-09 LAB
ATRIAL RATE: 87 BPM
P AXIS: 89 DEGREES
P OFFSET: 189 MS
P ONSET: 124 MS
PR INTERVAL: 202 MS
Q ONSET: 225 MS
QRS COUNT: 15 BEATS
QRS DURATION: 96 MS
QT INTERVAL: 362 MS
QTC CALCULATION(BAZETT): 435 MS
QTC FREDERICIA: 409 MS
R AXIS: 50 DEGREES
T AXIS: 86 DEGREES
T OFFSET: 406 MS
VENTRICULAR RATE: 87 BPM

## 2025-04-09 PROCEDURE — 3078F DIAST BP <80 MM HG: CPT | Performed by: INTERNAL MEDICINE

## 2025-04-09 PROCEDURE — 1036F TOBACCO NON-USER: CPT | Performed by: INTERNAL MEDICINE

## 2025-04-09 PROCEDURE — 1123F ACP DISCUSS/DSCN MKR DOCD: CPT | Performed by: INTERNAL MEDICINE

## 2025-04-09 PROCEDURE — 3008F BODY MASS INDEX DOCD: CPT | Performed by: INTERNAL MEDICINE

## 2025-04-09 PROCEDURE — 3074F SYST BP LT 130 MM HG: CPT | Performed by: INTERNAL MEDICINE

## 2025-04-09 PROCEDURE — 99214 OFFICE O/P EST MOD 30 MIN: CPT | Mod: 25 | Performed by: INTERNAL MEDICINE

## 2025-04-09 PROCEDURE — 4010F ACE/ARB THERAPY RXD/TAKEN: CPT | Performed by: INTERNAL MEDICINE

## 2025-04-09 PROCEDURE — 1157F ADVNC CARE PLAN IN RCRD: CPT | Performed by: INTERNAL MEDICINE

## 2025-04-09 PROCEDURE — 99214 OFFICE O/P EST MOD 30 MIN: CPT | Performed by: INTERNAL MEDICINE

## 2025-04-09 PROCEDURE — 93005 ELECTROCARDIOGRAM TRACING: CPT | Performed by: INTERNAL MEDICINE

## 2025-04-09 PROCEDURE — 93010 ELECTROCARDIOGRAM REPORT: CPT | Performed by: INTERNAL MEDICINE

## 2025-04-09 PROCEDURE — 1159F MED LIST DOCD IN RCRD: CPT | Performed by: INTERNAL MEDICINE

## 2025-04-09 ASSESSMENT — ENCOUNTER SYMPTOMS
DEPRESSION: 0
LOSS OF SENSATION IN FEET: 0
OCCASIONAL FEELINGS OF UNSTEADINESS: 0

## 2025-04-11 ENCOUNTER — EVALUATION (OUTPATIENT)
Dept: PHYSICAL THERAPY | Facility: HOSPITAL | Age: 75
End: 2025-04-11
Payer: MEDICARE

## 2025-04-11 DIAGNOSIS — M54.50 CHRONIC BILATERAL LOW BACK PAIN WITHOUT SCIATICA: Primary | ICD-10-CM

## 2025-04-11 DIAGNOSIS — M53.3 SACROILIAC JOINT DYSFUNCTION OF RIGHT SIDE: ICD-10-CM

## 2025-04-11 DIAGNOSIS — R29.898 WEAKNESS OF BOTH LEGS: ICD-10-CM

## 2025-04-11 DIAGNOSIS — G89.29 CHRONIC BILATERAL LOW BACK PAIN WITHOUT SCIATICA: Primary | ICD-10-CM

## 2025-04-11 DIAGNOSIS — R26.89 FUNCTIONAL GAIT ABNORMALITY: Chronic | ICD-10-CM

## 2025-04-11 PROCEDURE — 97161 PT EVAL LOW COMPLEX 20 MIN: CPT | Mod: GP | Performed by: PHYSICAL THERAPIST

## 2025-04-11 PROCEDURE — 97110 THERAPEUTIC EXERCISES: CPT | Mod: GP | Performed by: PHYSICAL THERAPIST

## 2025-04-11 ASSESSMENT — ACTIVITIES OF DAILY LIVING (ADL): EFFECT OF PAIN ON DAILY ACTIVITIES: LIMITS

## 2025-04-11 ASSESSMENT — PATIENT HEALTH QUESTIONNAIRE - PHQ9
1. LITTLE INTEREST OR PLEASURE IN DOING THINGS: NOT AT ALL
SUM OF ALL RESPONSES TO PHQ9 QUESTIONS 1 AND 2: 0
2. FEELING DOWN, DEPRESSED OR HOPELESS: NOT AT ALL

## 2025-04-11 ASSESSMENT — ENCOUNTER SYMPTOMS
OCCASIONAL FEELINGS OF UNSTEADINESS: 1
LOSS OF SENSATION IN FEET: 1
DEPRESSION: 1

## 2025-04-11 ASSESSMENT — PAIN SCALES - GENERAL: PAINLEVEL_OUTOF10: 4

## 2025-04-11 ASSESSMENT — PAIN DESCRIPTION - DESCRIPTORS: DESCRIPTORS: BURNING;SHARP

## 2025-04-11 ASSESSMENT — PAIN - FUNCTIONAL ASSESSMENT: PAIN_FUNCTIONAL_ASSESSMENT: 0-10

## 2025-04-11 NOTE — PROGRESS NOTES
Physical Therapy    Physical Therapy Lumbar Spine Evaluation    Patient Name: Isauro Valdez  MRN: [id number]  Today's Date: 2025  Time Calculation  Start Time: 0900  Stop Time: 0950  Time Calculation (min): 50 min  PT Evaluation Time Entry  PT Evaluation (Low) Time Entry: 25  PT Therapeutic Procedures Time Entry  Therapeutic Exercise Time Entry: 25             Visit Number: 1  Auth Dates:   Transfer care to [facility]  Current Problem  Problem List Items Addressed This Visit             ICD-10-CM       Musculoskeletal and Injuries    Low back pain M54.50    Weakness of both legs - Primary R29.898       Symptoms and Signs    Functional gait abnormality (Chronic) R26.89     Other Visit Diagnoses         Codes    Sacroiliac joint dysfunction of right side     M53.3               General  Name and  confirmed with patient on this date.   Pt has scoliosis since childhood, worse casts and braces as a child. He has suffered from LBP chronically, however in the past 6-8 months pain has gotten bad enough for him to seek treatment.    Ambulatory Screening Summary     Screening  Frequency  Date Last Completed   Spiritual and Cultural Beliefs   Screening  each visit or episode of care 2025   Falls Risk Screening  every ambulatory visit 2025  9:04 AM   Pain Screening  annually at primary care visit  3/31/2025   Domestic Violence screening  annually at primary care visit 2025   Elder Abuse Screening  annually at primary care visit    Depression Screening  annually in the primary care setting 2025   Suicide Risk Screening  annually in the primary care setting    Nutrition and Food Insecurity   Screening  at least annually at primary care visit  2025   Key Learner  annually in the primary care setting 2025   Drug Screen  3/31/2025  9:29 AM   Alcohol Screen  3/31/2025  9:29 AM   Advance Directive           Precautions   STEADI: 6 (fall risk)   History of partial discectomy in lumbar spine approx 15  years ago    Functional Outcome:  Oswestry: 17/50 (34%)    Pain  Pain Assessment: 0-10  0-10 (Numeric) Pain Score: 4 (7/10 at worst in mornings)  Pain Type: Chronic pain  Pain Location: Back  Pain Orientation: Right, Left  Pain Radiating Towards: Right hip  Pain Descriptors: Burning, Sharp  Pain Frequency: Constant/continuous  Clinical Progression: Gradually worsening  Effect of Pain on Daily Activities: Limits  Patient's Stated Pain Goal: 2  Pain Interventions: Medication (See MAR) (Gabapentin, lidocaine cream)  Response to Interventions: Decrease in pain    SUBJECTIVE:   Chief complaint:  low back and R hip pain    Pain Better: with gentle movements  Pain Worse: mornings  Imaging: X-Ray - moderate spondylosis  Prior level of function: increased ability to stand, bend over, walk  Current limitations: bending over to pick things up off the floor, standing 5-10 min  Home setup: one story home with no ONEIL  Work: retired  Patients goal: reduce pain and learn exercises to manage symptoms  Prior tx: injections years ago    OBJECTIVE:    Posture: increased thoracic kyphosis, forward head, rounded shoulders, decreased lumbar lordosis    Spine ROM: WNL Unless documented below:  ROM (In degrees)   Flexion 30   Extension 3    Right Left   Side Bend 15 11   Rotation 25 25       Lower Extremity Strength: (5/5 unless noted)   RIGHT LEFT   Hip Flexion 3+ 3-   Hip Abduction 3+ 3+   Knee Extension 3+ 4-   Knee Flexion 3+ 4-   Ankle DF 3+ 4-     Joint mobility general stiffness in the lumbar spine  Palpation: TTP bilat lumbar area and into the R hip  Integument: Ulcer of R big toe  Neurological symptoms N/T in bilat feet.   Special tests:  Slump tightness in the L HS, no back pain with testing      Response to Sandra Repeated Testing:  Neutral    Gait: forward flexed posture, mikie hip externally rotated, R worse than L. Decreased step length, decreased hip/knee flexion during swing and lacks hip extension during push-off  bilaterally.    Stairs: reciprocal and non using 2 HRs      TREATMENT:     EXERCISES Date 4/11/2025   Date Date Date    REPS REPS REPS REPS          Nustep L4 10 min                    Shuttle  DLP       Shuttle SLP       Shuttle TR/HR              Qhip Flexion (teach for gym)       Qhip Extension (teach for gym)       Qhip Abduction (teach for gym)                     Q Quad (teach for gym)       Q Hamstring  (teach for gym)              Parallel bars:       3-way SLR       Step-ups       Squats vs. Sit <> stands       RB calf stretch       Lateral walking       Backwards walking                                          HEP Access Code: [email or web address]            ASSESSEMENT  Pt is a 74 y.o.  referred for physical therapy by Sapna Thomas*  for R sided chronic low back pain. Pt presents with decreased strength, decreased ROM, gait abnormalities and pain. This patient would benefit from a therapy program to restore prior level of function, decrease pain, increase AROM, increase strength and improve posture.    The physical therapy prognosis is good for the patient to achieve their goals.   The pt tolerated therapy treatment today with no adverse effects.  Barriers to therapy/learning include:  financial     PLAN  OP PT Plan  Treatment/Interventions: Cryotherapy, Education/ Instruction, Electrical stimulation, Hot pack, Gait training, Manual therapy, Neuromuscular re-education, Mechanical traction, Therapeutic activities, Therapeutic exercises  PT Plan: Skilled PT  PT Frequency: 1 time per week  Duration: 4 weeks  Onset Date: 03/31/25  Certification Period Start Date: 04/11/25  Certification Period End Date: 07/11/25  Rehab Potential: Good  Plan of Care Agreement: Patient    The pt has been educated about the risks and benefits of physical therapy and gives consent for treatment.     The patient will benefit from physical therapy treatment to include: Treatment/Interventions: Cryotherapy, Education/ Instruction,  Electrical stimulation, Hot pack, Gait training, Manual therapy, Neuromuscular re-education, Mechanical traction, Therapeutic activities, Therapeutic exercises    Goals:  Active       PT Problem       PT Goal 1       Start:  04/11/25    Expected End:  05/23/25       Pt will be able to teach back 90% of HEP in order to maintain gains made in PT.          PT Goal 2       Start:  04/11/25    Expected End:  05/23/25       Pt will increase LE strength to 4+/5 or greater in order to improve gait and tolerance to functional activities.          PT Goal 3       Start:  04/11/25    Expected End:  05/23/25       Pt will decrease pain to 4/10 at worst, with pt able to manage symptoms with HEP, positioning and modalities independently.          PT Goal 4       Start:  04/11/25    Expected End:  05/23/25       Pt will increase lumbar ROM by 10 degrees in every plane in order to reduce pain and improve tolerance to functional mobility.          PT Goal 5       Start:  04/11/25    Expected End:  05/02/25       Pt will be able to teach back 50% of HEP in order to maintain gains made in PT.

## 2025-04-18 ENCOUNTER — APPOINTMENT (OUTPATIENT)
Dept: PRIMARY CARE | Facility: CLINIC | Age: 75
End: 2025-04-18
Payer: MEDICARE

## 2025-04-20 PROCEDURE — RXMED WILLOW AMBULATORY MEDICATION CHARGE

## 2025-04-21 ENCOUNTER — APPOINTMENT (OUTPATIENT)
Dept: PRIMARY CARE | Facility: CLINIC | Age: 75
End: 2025-04-21
Payer: MEDICARE

## 2025-04-21 DIAGNOSIS — E11.42 TYPE 2 DIABETES MELLITUS WITH DIABETIC POLYNEUROPATHY, WITHOUT LONG-TERM CURRENT USE OF INSULIN: ICD-10-CM

## 2025-04-21 PROCEDURE — RXMED WILLOW AMBULATORY MEDICATION CHARGE

## 2025-04-21 RX ORDER — TIRZEPATIDE 7.5 MG/.5ML
7.5 INJECTION, SOLUTION SUBCUTANEOUS WEEKLY
Qty: 2 ML | Refills: 1 | Status: SHIPPED | OUTPATIENT
Start: 2025-04-21

## 2025-04-21 NOTE — PROGRESS NOTES
Pharmacist Clinic: Diabetes Management  Isauro Valdez is a 74 y.o. male was referred to Clinical Pharmacy Team for diabetes management.   Referring Provider: Javier Hong, *  - Last visit with referring provider: 1/2/25     Patient Assistance for Ozempic, Eliquis, Jardiance approved through 1/23/26. Will have to be renewed prior to that date to prevent lapse in coverage. Medication(s) will be received at no cost to patient from [facility].     Subjective     HPI    Current Diabetes Pharmacotherapy:    - Ozempic 2 mg weekly - Wednesdays (3 doses so far)  - Jardiance 25 mg daily  - Metformin 1,000 mg BID  - Glimepiride 4 mg BID with meals    Social History:  Current diet:   - B: english muffin  - L: soup; left overs  - D: chicken; asparagus; squash  - Trying to switch to mediterranean diet  - Seeing a nutritionist   - Cookies throughout the day  - Sugar free thin mint before bed  - Half a pb sandwich before bed   - Denies pop   - Drinks dark cherry juice for gout sometimes     Current exercise:   - None    Current monitoring regimen:   Patient is using: continuous glucose monitor  Type of CGM: Kalpana 3 plus - reader    Reported blood sugars:     30 day report:  - Above: 92%  - Target: 8%  - Below: 0%    14 day report:  - Above: 86%  - Target: 14%  - Below: 0%    7 day report:  - Above: 81%  - Target: 19%  - Below: 0%    Any episodes of hypoglycemia? No    Adverse Effects: Some nausea but could be due to antibiotic he is on     Objective     There were no vitals taken for this visit.    Allergies   Allergen Reactions    Adhesive Tape-Silicones Unknown    Niacin Hives and Unknown    Nystatin Other    Penicillins Hives and Unknown       Historical Diabetes Pharmacotherapy:  None    SECONDARY PREVENTION  - Statin? Yes   - ACE-I/ARB? Yes  - Aspirin? Yes    Pertinent PMH Review:  - PMH of Pancreatitis: No  - PMH of Retinopathy: No  - PMH of Urinary Tract Infections: No  - PMH of Yeast Infections: No  -  PMH of MTC: No    Lab Review  Lab Results   Component Value Date    BILITOT 0.5 03/24/2025    CALCIUM 10.9 (H) 03/24/2025    CO2 29 03/24/2025     03/24/2025    CREATININE 1.18 03/24/2025    GLUCOSE 214 (H) 03/24/2025    ALKPHOS 67 03/24/2025    K 3.9 03/24/2025    PROT 7.4 03/24/2025     03/24/2025    AST 43 (H) 03/24/2025    ALT 72 (H) 03/24/2025    BUN 29 (H) 03/24/2025    ANIONGAP 13 03/24/2025    MG 2.0 03/24/2025    ALBUMIN 4.7 03/24/2025    GFRMALE 62 06/12/2023   GFR = 66     Lab Results   Component Value Date    TRIG 156 (H) 03/24/2025    CHOL 148 03/24/2025    HDL 36 (L) 03/24/2025     Lab Results   Component Value Date    HGBA1C 9.0 (H) 03/05/2025    HGBA1C 7.6 (H) 10/23/2024    HGBA1C 7.4 (H) 05/13/2024     The ASCVD Risk score (Skip RENAE, et al., 2019) failed to calculate for the following reasons:    Risk score cannot be calculated because patient has a medical history suggesting prior/existing ASCVD    Drug Interactions:  - None    Affordability/Accessibility:  - Enrolled in [facility]    Preferred Pharmacy:  - [facility]    Assessment/Plan   Problem List Items Addressed This Visit       Type 2 diabetes mellitus    Relevant Medications    tirzepatide (Mounjaro) 7.5 mg/0.5 mL pen injector    Other Relevant Orders    [facility] INSULIN    C-Peptide    Referral to Clinical Pharmacy       ASSESSMENT:  Patients diabetes is uncontrolled with most recent A1c of 9.0%.   (Goal A1c < 7.5%)    Patient's blood sugars have remained the same and not at goal. Time in range still around 15%. Wondering if he is starting to become insulin dependent. Will have him get a C peptide and fasting insulin. I will also switch him from Ozempic to Mounjaro 7.5 mg. Had long discussion about possible initiation of basal insulin next appointment.    PLAN:  STOP Ozempic  START Mounjaro 7.5 mg weekly  CONTINUE all other DM medications  Follow up with clinical pharmacist: 5/16/25 @ 3:40  Follow up with PCP: 5/5/25    Thank you,  Katherin  Starr Velasquez  Clinical Pharmacy Specialist  [phone]  [email or web address]\[email or web address]     Continue all meds under the continuation of care with the referring provider and clinical pharmacy team.

## 2025-04-22 ENCOUNTER — TREATMENT (OUTPATIENT)
Dept: PHYSICAL THERAPY | Facility: HOSPITAL | Age: 75
End: 2025-04-22
Payer: MEDICARE

## 2025-04-22 DIAGNOSIS — G89.29 CHRONIC BILATERAL LOW BACK PAIN WITHOUT SCIATICA: ICD-10-CM

## 2025-04-22 DIAGNOSIS — R29.898 WEAKNESS OF BOTH LEGS: ICD-10-CM

## 2025-04-22 DIAGNOSIS — M53.3 SACROILIAC JOINT DYSFUNCTION OF RIGHT SIDE: ICD-10-CM

## 2025-04-22 DIAGNOSIS — M54.50 CHRONIC BILATERAL LOW BACK PAIN WITHOUT SCIATICA: ICD-10-CM

## 2025-04-22 DIAGNOSIS — R26.89 FUNCTIONAL GAIT ABNORMALITY: Chronic | ICD-10-CM

## 2025-04-22 PROCEDURE — 97110 THERAPEUTIC EXERCISES: CPT | Mod: GP,CQ

## 2025-04-22 ASSESSMENT — PAIN SCALES - GENERAL: PAINLEVEL_OUTOF10: 3

## 2025-04-22 ASSESSMENT — PAIN - FUNCTIONAL ASSESSMENT: PAIN_FUNCTIONAL_ASSESSMENT: 0-10

## 2025-04-22 NOTE — PROGRESS NOTES
"Physical Therapy    Physical Therapy Treatment    Patient Name: Isauro Valdez  MRN: [id number]  : 1950  Today's Date: 2025  Time Calculation  Start Time: 545  Stop Time: 625  Time Calculation (min): 40 min    PT Therapeutic Procedures Time Entry  Therapeutic Exercise Time Entry: 40          VISIT:# 2    Current Problem  Problem List Items Addressed This Visit           ICD-10-CM    Low back pain M54.50    Functional gait abnormality (Chronic) R26.89    Weakness of both legs R29.898     Other Visit Diagnoses         Codes      Sacroiliac joint dysfunction of right side     M53.3             Subjective    Pt reports he has a sore on his foot.  He is on antibiotics.      Pain  Pain Assessment: 0-10  0-10 (Numeric) Pain Score: 3  Pain Location: Hip  Pain Orientation: Right, Left  Patient's Stated Pain Goal: 2  Pain Interventions: Medication (See MAR) (SI belt)       Objective    Started land based therapy              Precautions  Precautions  STEADI Fall Risk Score (The score of 4 or more indicates an increased risk of falling): unchanged  Precautions Comment: none      Treatments:     EXERCISES Date 2025   Date 2025   Visit #2 Date Date    REPS REPS REPS REPS          Nustep L4 10 min 10' @L4                   Shuttle  DLP       Shuttle SLP       Shuttle TR/HR              Qhip Flexion (teach for gym)  10 x 2 // bars     Qhip Extension (teach for gym)  10 x 2 // bars     Qhip Abduction (teach for gym)  10 x 2  // bars                    Q Quad (teach for gym)  10 x 2 seated     Q Hamstring  (teach for gym)  10 x 2 rtb            Parallel bars:       3-way SLR       Step-ups       Squats vs. Sit <> stands       RB calf stretch  Hs stretch mikie   30\" x 3   20\" x 3     Lateral walking       Backwards walking                                          HEP Access Code: [email or web address]                             Assessment:   Pt tolerated treatment without complaint of increase in symptoms.  Pt required rests " d/t standing on his feet.  Pt reports he is a little winded after therapy        Plan: Cont to progress strength.   OP PT Plan  Treatment/Interventions: Cryotherapy, Education/ Instruction, Electrical stimulation, Hot pack, Gait training, Manual therapy, Neuromuscular re-education, Mechanical traction, Therapeutic activities, Therapeutic exercises  PT Plan: Skilled PT  PT Frequency: 1 time per week  Duration: 4 weeks  Onset Date: 03/31/25  Certification Period Start Date: 04/11/25  Certification Period End Date: 07/11/25  Rehab Potential: Good  Plan of Care Agreement: Patient    Goals:  Active       PT Problem       PT Goal 1       Start:  04/11/25    Expected End:  05/23/25       Pt will be able to teach back 90% of HEP in order to maintain gains made in PT.          PT Goal 2       Start:  04/11/25    Expected End:  05/23/25       Pt will increase LE strength to 4+/5 or greater in order to improve gait and tolerance to functional activities.          PT Goal 3       Start:  04/11/25    Expected End:  05/23/25       Pt will decrease pain to 4/10 at worst, with pt able to manage symptoms with HEP, positioning and modalities independently.          PT Goal 4       Start:  04/11/25    Expected End:  05/23/25       Pt will increase lumbar ROM by 10 degrees in every plane in order to reduce pain and improve tolerance to functional mobility.          PT Goal 5       Start:  04/11/25    Expected End:  05/02/25       Pt will be able to teach back 50% of HEP in order to maintain gains made in PT.

## 2025-04-23 LAB
C PEPTIDE SERPL-MCNC: 5.11 NG/ML (ref 0.8–3.85)
INSULIN SERPL-ACNC: 10.5 UIU/ML

## 2025-04-24 ENCOUNTER — PHARMACY VISIT (OUTPATIENT)
Dept: PHARMACY | Facility: CLINIC | Age: 75
End: 2025-04-24
Payer: MEDICARE

## 2025-04-29 ENCOUNTER — TELEPHONE (OUTPATIENT)
Dept: PRIMARY CARE | Facility: CLINIC | Age: 75
End: 2025-04-29

## 2025-04-29 ENCOUNTER — TREATMENT (OUTPATIENT)
Dept: PHYSICAL THERAPY | Facility: HOSPITAL | Age: 75
End: 2025-04-29
Payer: MEDICARE

## 2025-04-29 DIAGNOSIS — R26.89 FUNCTIONAL GAIT ABNORMALITY: Chronic | ICD-10-CM

## 2025-04-29 DIAGNOSIS — R29.898 WEAKNESS OF BOTH LEGS: ICD-10-CM

## 2025-04-29 DIAGNOSIS — M53.3 SACROILIAC JOINT DYSFUNCTION OF RIGHT SIDE: ICD-10-CM

## 2025-04-29 DIAGNOSIS — M54.50 CHRONIC BILATERAL LOW BACK PAIN WITHOUT SCIATICA: ICD-10-CM

## 2025-04-29 DIAGNOSIS — G89.29 CHRONIC BILATERAL LOW BACK PAIN WITHOUT SCIATICA: ICD-10-CM

## 2025-04-29 PROCEDURE — 97110 THERAPEUTIC EXERCISES: CPT | Mod: GP,CQ

## 2025-04-29 RX ORDER — CLINDAMYCIN HYDROCHLORIDE 150 MG/1
1 CAPSULE ORAL EVERY 6 HOURS
COMMUNITY
Start: 2025-04-10 | End: 2025-05-05 | Stop reason: WASHOUT

## 2025-04-29 ASSESSMENT — PAIN - FUNCTIONAL ASSESSMENT: PAIN_FUNCTIONAL_ASSESSMENT: 0-10

## 2025-04-29 ASSESSMENT — PAIN SCALES - GENERAL: PAINLEVEL_OUTOF10: 2

## 2025-04-29 NOTE — PROGRESS NOTES
"Physical Therapy    Physical Therapy Treatment    Patient Name: Isauro Valedz  MRN: [id number]  : 1950  Today's Date: 2025  Time Calculation  Start Time: 945  Stop Time: 1027  Time Calculation (min): 42 min    PT Therapeutic Procedures Time Entry  Therapeutic Exercise Time Entry: 42          VISIT:# 3    Current Problem  Problem List Items Addressed This Visit           ICD-10-CM    Low back pain M54.50    Functional gait abnormality (Chronic) R26.89    Weakness of both legs R29.898     Other Visit Diagnoses         Codes      Sacroiliac joint dysfunction of right side     M53.3             Subjective    Pt reports pain is low and in mikie hips today. He has not had any falls since last visit. He had some soreness after the last visit but lasted a short amount of time. He has been working in the yard carrying mulch and applying it.     Pain  Pain Assessment: 0-10  0-10 (Numeric) Pain Score: 2  Pain Location: Hip  Pain Orientation: Right, Left       Objective    Added shuttle   Added lateral and retro ambulation in // bars.          Precautions  Precautions  STEADI Fall Risk Score (The score of 4 or more indicates an increased risk of falling): unchanged  Precautions Comment: none      Treatments:     EXERCISES Date 2025   Date 25   Visit #2 Date 25 Date    REPS REPS REPS REPS          Nustep L4 10 min 10' @L4 10' L4                  Shuttle  DLP   10x2 5B    Shuttle SLP   10x2 4B    Shuttle TR/HR              Qhip Flexion (teach for gym)  10 x 2 // bars 10 x 2 // bars    Qhip Extension (teach for gym)  10 x 2 // bars 10 x 2 // bars    Qhip Abduction (teach for gym)  10 x 2  // bars  10 x 2 // bars                  Q Quad (teach for gym)  10 x 2 seated 10 x 2 seated    Q Hamstring  (teach for gym)  10 x 2 rtb 10 x 2 seated RTB           Parallel bars:       3-way SLR       Step-ups       Squats vs. Sit <> stands       RB calf stretch  Hs stretch mikie   30\" x 3   20\" x 3 30\"x3  20\"x3  "   Lateral walking   2 laps    Backwards walking   2 laps                                       HEP Access Code: [email or web address]                             Assessment:   Pt did not require rest breaks between sets in // bars this session. Pt able to complete ambulation in // bars with min visual cues for technique. Pt noted mikie hip pain increased to 3/10 at end of session.    Plan: Reassess next visit.    Goals:  Active       PT Problem       PT Goal 1       Start:  04/11/25    Expected End:  05/23/25       Pt will be able to teach back 90% of HEP in order to maintain gains made in PT.          PT Goal 2       Start:  04/11/25    Expected End:  05/23/25       Pt will increase LE strength to 4+/5 or greater in order to improve gait and tolerance to functional activities.          PT Goal 3       Start:  04/11/25    Expected End:  05/23/25       Pt will decrease pain to 4/10 at worst, with pt able to manage symptoms with HEP, positioning and modalities independently.          PT Goal 4       Start:  04/11/25    Expected End:  05/23/25       Pt will increase lumbar ROM by 10 degrees in every plane in order to reduce pain and improve tolerance to functional mobility.          PT Goal 5       Start:  04/11/25    Expected End:  05/02/25       Pt will be able to teach back 50% of HEP in order to maintain gains made in PT.

## 2025-05-05 ENCOUNTER — APPOINTMENT (OUTPATIENT)
Dept: PRIMARY CARE | Facility: CLINIC | Age: 75
End: 2025-05-05
Payer: MEDICARE

## 2025-05-05 VITALS
HEIGHT: 75 IN | DIASTOLIC BLOOD PRESSURE: 68 MMHG | SYSTOLIC BLOOD PRESSURE: 102 MMHG | OXYGEN SATURATION: 95 % | WEIGHT: 250 LBS | BODY MASS INDEX: 31.08 KG/M2 | RESPIRATION RATE: 18 BRPM | HEART RATE: 88 BPM | TEMPERATURE: 97.3 F

## 2025-05-05 DIAGNOSIS — E78.5 HYPERLIPIDEMIA, UNSPECIFIED HYPERLIPIDEMIA TYPE: ICD-10-CM

## 2025-05-05 DIAGNOSIS — I50.42 CHRONIC COMBINED SYSTOLIC (CONGESTIVE) AND DIASTOLIC (CONGESTIVE) HEART FAILURE: ICD-10-CM

## 2025-05-05 DIAGNOSIS — I48.0 PAROXYSMAL ATRIAL FIBRILLATION (MULTI): ICD-10-CM

## 2025-05-05 DIAGNOSIS — E78.5 HYPERLIPIDEMIA, UNSPECIFIED: ICD-10-CM

## 2025-05-05 DIAGNOSIS — T46.4X5A ADVERSE EFFECT OF ANGIOTENSIN-CONVERTING-ENZYME INHIBITORS, INITIAL ENCOUNTER: ICD-10-CM

## 2025-05-05 DIAGNOSIS — I10 ESSENTIAL (PRIMARY) HYPERTENSION: ICD-10-CM

## 2025-05-05 DIAGNOSIS — R05.8 OTHER SPECIFIED COUGH: ICD-10-CM

## 2025-05-05 DIAGNOSIS — I50.42 CHRONIC COMBINED SYSTOLIC AND DIASTOLIC CHF (CONGESTIVE HEART FAILURE): ICD-10-CM

## 2025-05-05 DIAGNOSIS — I25.10 ASHD (ARTERIOSCLEROTIC HEART DISEASE): ICD-10-CM

## 2025-05-05 DIAGNOSIS — E11.65 POORLY CONTROLLED TYPE 2 DIABETES MELLITUS (MULTI): ICD-10-CM

## 2025-05-05 DIAGNOSIS — L84 CALLUS OF FOOT: ICD-10-CM

## 2025-05-05 DIAGNOSIS — Z86.79 PERSONAL HISTORY OF OTHER DISEASES OF THE CIRCULATORY SYSTEM: ICD-10-CM

## 2025-05-05 DIAGNOSIS — N18.31 STAGE 3A CHRONIC KIDNEY DISEASE (MULTI): ICD-10-CM

## 2025-05-05 DIAGNOSIS — I10 BENIGN ESSENTIAL HYPERTENSION: Primary | ICD-10-CM

## 2025-05-05 DIAGNOSIS — E11.49 TYPE 2 DIABETES MELLITUS WITH OTHER DIABETIC NEUROLOGICAL COMPLICATION: ICD-10-CM

## 2025-05-05 PROCEDURE — 3074F SYST BP LT 130 MM HG: CPT | Performed by: INTERNAL MEDICINE

## 2025-05-05 PROCEDURE — 1123F ACP DISCUSS/DSCN MKR DOCD: CPT | Performed by: INTERNAL MEDICINE

## 2025-05-05 PROCEDURE — 1036F TOBACCO NON-USER: CPT | Performed by: INTERNAL MEDICINE

## 2025-05-05 PROCEDURE — 4010F ACE/ARB THERAPY RXD/TAKEN: CPT | Performed by: INTERNAL MEDICINE

## 2025-05-05 PROCEDURE — 1159F MED LIST DOCD IN RCRD: CPT | Performed by: INTERNAL MEDICINE

## 2025-05-05 PROCEDURE — 1125F AMNT PAIN NOTED PAIN PRSNT: CPT | Performed by: INTERNAL MEDICINE

## 2025-05-05 PROCEDURE — 3008F BODY MASS INDEX DOCD: CPT | Performed by: INTERNAL MEDICINE

## 2025-05-05 PROCEDURE — 3078F DIAST BP <80 MM HG: CPT | Performed by: INTERNAL MEDICINE

## 2025-05-05 PROCEDURE — 99214 OFFICE O/P EST MOD 30 MIN: CPT | Performed by: INTERNAL MEDICINE

## 2025-05-05 PROCEDURE — 1157F ADVNC CARE PLAN IN RCRD: CPT | Performed by: INTERNAL MEDICINE

## 2025-05-05 RX ORDER — GLIMEPIRIDE 4 MG/1
4 TABLET ORAL
Qty: 180 TABLET | Refills: 1 | Status: SHIPPED | OUTPATIENT
Start: 2025-05-05 | End: 2025-11-01

## 2025-05-05 RX ORDER — GABAPENTIN 300 MG/1
600 CAPSULE ORAL NIGHTLY
Qty: 180 CAPSULE | Refills: 3 | Status: SHIPPED | OUTPATIENT
Start: 2025-05-05

## 2025-05-05 RX ORDER — FUROSEMIDE 40 MG/1
40 TABLET ORAL DAILY
Qty: 90 TABLET | Refills: 3 | Status: SHIPPED | OUTPATIENT
Start: 2025-05-05

## 2025-05-05 RX ORDER — ROSUVASTATIN CALCIUM 20 MG/1
20 TABLET, COATED ORAL DAILY
Qty: 90 TABLET | Refills: 3 | Status: SHIPPED | OUTPATIENT
Start: 2025-05-05

## 2025-05-05 RX ORDER — HYDROCHLOROTHIAZIDE 25 MG/1
25 TABLET ORAL DAILY
Qty: 90 TABLET | Refills: 0 | Status: SHIPPED | OUTPATIENT
Start: 2025-05-05

## 2025-05-05 RX ORDER — CARVEDILOL 25 MG/1
25 TABLET ORAL
Qty: 180 TABLET | Refills: 1 | Status: SHIPPED | OUTPATIENT
Start: 2025-05-05

## 2025-05-05 RX ORDER — METFORMIN HYDROCHLORIDE 1000 MG/1
1000 TABLET ORAL EVERY 12 HOURS
Qty: 180 TABLET | Refills: 0 | Status: SHIPPED | OUTPATIENT
Start: 2025-05-05

## 2025-05-05 RX ORDER — LOSARTAN POTASSIUM 100 MG/1
100 TABLET ORAL DAILY
Qty: 90 TABLET | Refills: 3 | Status: SHIPPED | OUTPATIENT
Start: 2025-05-05

## 2025-05-05 SDOH — ECONOMIC STABILITY: FOOD INSECURITY: WITHIN THE PAST 12 MONTHS, THE FOOD YOU BOUGHT JUST DIDN'T LAST AND YOU DIDN'T HAVE MONEY TO GET MORE.: NEVER TRUE

## 2025-05-05 SDOH — ECONOMIC STABILITY: FOOD INSECURITY: WITHIN THE PAST 12 MONTHS, YOU WORRIED THAT YOUR FOOD WOULD RUN OUT BEFORE YOU GOT MONEY TO BUY MORE.: NEVER TRUE

## 2025-05-05 ASSESSMENT — PATIENT HEALTH QUESTIONNAIRE - PHQ9
1. LITTLE INTEREST OR PLEASURE IN DOING THINGS: NOT AT ALL
2. FEELING DOWN, DEPRESSED OR HOPELESS: NOT AT ALL
SUM OF ALL RESPONSES TO PHQ9 QUESTIONS 1 & 2: 0

## 2025-05-05 ASSESSMENT — LIFESTYLE VARIABLES
HOW OFTEN DO YOU HAVE A DRINK CONTAINING ALCOHOL: NEVER
SKIP TO QUESTIONS 9-10: 1
HOW OFTEN DO YOU HAVE SIX OR MORE DRINKS ON ONE OCCASION: NEVER
AUDIT-C TOTAL SCORE: 0
HOW MANY STANDARD DRINKS CONTAINING ALCOHOL DO YOU HAVE ON A TYPICAL DAY: PATIENT DOES NOT DRINK

## 2025-05-05 ASSESSMENT — PAIN SCALES - GENERAL: PAINLEVEL_OUTOF10: 2

## 2025-05-05 NOTE — ASSESSMENT & PLAN NOTE
Refer to Dr Chadwick for evaluation. Patient would prefer to have all providers in  system, he is concerned about recurrent bloody callus on the foot

## 2025-05-05 NOTE — PROGRESS NOTES
Chief Complaint/HPI:    Follow up:     DM type 2: Patient takes Mounjaro,  metformin, Jardiance., glimepiride now, glucoses have improved , he has seen endocrinology also (Dr Shah). Patient takes glimepiride twice daily, he has a gym membership now, he has not been very active. Bydureon was stopped, Actos was stopped due to heart failure risk. He has not had low glucoses since taking new med therapies. Fasting glucoses are in the 200s, PM glucoses drop to the 130 range.        CAD/ CHF/ HTN: he takes carvedilol, clonidine, HCTZ, Losartan, furosemide, and amlodipine. Patient sees Dr Nunez also (cardiology). BP is low today. Patient also takes Jardiance now. Actos was stopped due to risk of CHF noted     Atrial fib: patient takes Eliquis twice daily, he does go through assistance program for the med, patient has not been able to talk to clinical pharmacy since he does not have internet access.      hyperlipidemia: he takes rosuvastatin and colestipol      back pain: patient takes gabapentin at bedtime, he takes 600 mg at bedtime, it helps with restless leg issues also per recommendation of Dr Angel (sleep medicine), he takes iron. he does have an iron deficiency.   Patient sees physical medicine now, he has been doing PT.      Colon polyps: he had a follow up colonoscopy completed     BPH/ spot on the kidney: he takes saw michelle, he saw Dr Thompson.  Patient was advised by Dr Thompson that he did not require a urology follow up now.      Gout: he now takes allopurinol, 200 mg daily, he uses colchicine as needed      ROS otherwise negative aside from what was mentioned above in HPI.      Problem List[1]      Medical History[2]  Surgical History[3]  Social History     Social History Narrative    Not on file         ALLERGIES  Adhesive tape-silicones, Niacin, Nystatin, and Penicillins      MEDICATIONS  Medications Ordered Prior to Encounter[4]      PHYSICAL EXAM  /68 (BP Location: Left arm, BP Cuff Size: Large  "adult)   Pulse 88   Temp 36.3 °C (97.3 °F)   Resp 18   Ht 1.905 m (6' 3\")   Wt 113 kg (250 lb)   SpO2 95%   BMI 31.25 kg/m²   Body mass index is 31.25 kg/m².    Gen: Alert, NAD, he is obese, accompanied by wife, he ambulates with a cane  HEENT:  EOMI, conjunctiva and sclera normal in appearance, wearing glasses, no thyromegaly or neck masses  Respiratory:  Lungs CTAB  Cardiovascular:  Heart: regular  rate today and rhythm noted today . No M/R/G, no carotid bruits, very minimal peripheral edema noted  Abdominal obesity is noted  Neuro:  Gross motor and sensory intact  Skin:  No suspicious lesions present    ASSESSMENT/PLAN  Problem List Items Addressed This Visit       ASHD (arteriosclerotic heart disease)    Current Assessment & Plan   Stable, follow up with cardiology         Relevant Medications    carvedilol (Coreg) 25 mg tablet    Benign essential hypertension - Primary    Current Assessment & Plan   BP is stable, no med changes         Callus of foot    Current Assessment & Plan   Refer to Dr Chadwick for evaluation. Patient would prefer to have all providers in  system, he is concerned about recurrent bloody callus on the foot         Relevant Orders    Referral to Podiatry    Chronic combined systolic and diastolic CHF (congestive heart failure)    Current Assessment & Plan   Patient takes multiple meds , he is currently stable         Relevant Medications    carvedilol (Coreg) 25 mg tablet    Other Relevant Orders    Referral to Clinical Pharmacy    Hyperlipidemia    Current Assessment & Plan   Takes rosuvastatin, ezetimibe was just added by cardiology         Poorly controlled type 2 diabetes mellitus (Multi)    Current Assessment & Plan   Patient takes Mounjaro now, he continues glimepiride , metformin and Jardiance also. Glucose readings increased after stopping Actos and Bydureon. Will give Mounjaro a chance to improve glucoses, concerned however that fasting glucoses are still in the 200s, lowest " glucoses are in the 130s generally, recheck labs as ordered in 6/2025         Relevant Medications    glimepiride (Amaryl) 4 mg tablet    Other Relevant Orders    Referral to Clinical Pharmacy    Referral to Podiatry    Stage 3a chronic kidney disease (Multi)    Relevant Orders    Referral to Clinical Pharmacy     Other Visit Diagnoses         Hyperlipidemia, unspecified        Relevant Medications    rosuvastatin (Crestor) 20 mg tablet      Other specified cough        Relevant Medications    losartan (Cozaar) 100 mg tablet      Adverse effect of angiotensin-converting-enzyme inhibitors, initial encounter        Relevant Medications    losartan (Cozaar) 100 mg tablet      Type 2 diabetes mellitus with other diabetic neurological complication        Relevant Medications    metFORMIN (Glucophage) 1,000 mg tablet    gabapentin (Neurontin) 300 mg capsule      Personal history of other diseases of the circulatory system        Relevant Medications    hydroCHLOROthiazide (HYDRODiuril) 25 mg tablet      Chronic combined systolic (congestive) and diastolic (congestive) heart failure        Relevant Medications    furosemide (Lasix) 40 mg tablet    carvedilol (Coreg) 25 mg tablet      Paroxysmal atrial fibrillation (Multi)        Relevant Medications    apixaban (Eliquis) 5 mg tablet    carvedilol (Coreg) 25 mg tablet      Essential (primary) hypertension        Relevant Medications    carvedilol (Coreg) 25 mg tablet          Get labs completed as ordered next month, no other med changes for now.   Recheck the patient in 3 months, follow up with Dr Harry also    Javier Hong MD          [1]   Patient Active Problem List  Diagnosis    Unknown cause of injury    Obstructive sleep apnea syndrome in adult    PAF (paroxysmal atrial fibrillation) (Multi)    Personal history of colonic polyps    Poorly controlled type 2 diabetes mellitus (Multi)    Type 2 diabetes mellitus    Presence of coronary angioplasty implant and  graft    Primary osteoarthritis of left hand    Renal mass    Restless legs syndrome    Trigger middle finger of left hand    Swelling of left hand    Seasonal allergies    Obesity (BMI 30-39.9)    Low back pain    Joint swelling    Fatty liver    History of cardiomyopathy    Hypercalcemia    Hyperlipidemia    Anemia    ASHD (arteriosclerotic heart disease)    Hypertrophy of inter-atrial septum    Atopic dermatitis    Benign essential hypertension    BPH (benign prostatic hyperplasia)    Cardiac disease    Cellulitis of hand, left    Chronic combined systolic and diastolic CHF (congestive heart failure)    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 (Multi)    Chronic GERD    Disorder of iron metabolism    ACE-inhibitor cough    Dysuria    Nodule in muscle    Routine general medical examination at health care facility    Hyperuricemia    Gouty arthritis of left hand    Diverticulosis of large intestine    Gouty arthritis of toe    Hemorrhoids    Polyp of colon    Functional gait abnormality    Poor balance    Stage 3a chronic kidney disease (Multi)    On apixaban therapy    Thoracic ascending aortic aneurysm    Heart failure with recovered ejection fraction (HFrecEF)    CKD (chronic kidney disease)    Weakness of both legs    Callus of foot   [2]   Past Medical History:  Diagnosis Date    Cellulitis of left lower limb 09/06/2019    Cellulitis of left lower extremity    Corns and callosities 09/06/2019    Callus of foot    Coronary angioplasty status 09/06/2019    History of PTCA 1    Old myocardial infarction 09/06/2019    History of myocardial infarction    Other forms of dyspnea 02/19/2020    Dyspnea on exertion    Personal history of diabetic foot ulcer 09/06/2019    History of diabetic ulcer of foot    Personal history of other diseases of the circulatory system 10/07/2019    History of hypertension    Personal history of other drug therapy 09/06/2019    History of influenza vaccination     Personal history of other drug therapy 09/06/2019    History of hepatitis B vaccination    Personal history of other drug therapy 09/06/2019    History of pneumococcal vaccination    Personal history of other endocrine, nutritional and metabolic disease     History of type 2 diabetes mellitus    Personal history of other specified conditions 09/06/2019    History of persistent cough    Personal history of urinary calculi     History of kidney stones    Personal history of urinary calculi 09/06/2019    History of nephrolithiasis    Rash and other nonspecific skin eruption 12/27/2018    Rash of groin    Type 2 diabetes mellitus with diabetic nephropathy 10/07/2019    Type 2 diabetes mellitus with diabetic nephropathy   [3]   Past Surgical History:  Procedure Laterality Date    ANKLE SURGERY  05/17/2017    Ankle Surgery    CORONARY ANGIOPLASTY  05/17/2017    PTCA    FOOT SURGERY  06/12/2018    Foot Surgery    KNEE ARTHROSCOPY W/ DEBRIDEMENT  05/17/2017    Arthroscopy Knee    LITHOTRIPSY  05/17/2017    Renal Lithotripsy    LUMBAR LAMINECTOMY  05/17/2017    Laminectomy Lumbar    OTHER SURGICAL HISTORY  05/17/2017    Biopsy Of Liver    OTHER SURGICAL HISTORY  09/01/2022    Tonsillectomy with adenoidectomy   [4]   Current Outpatient Medications on File Prior to Visit   Medication Sig Dispense Refill    allopurinol (Zyloprim) 100 mg tablet TAKE 2 TABLETS BY MOUTH ONCE DAILY. 180 tablet 1    amLODIPine (Norvasc) 2.5 mg tablet TAKE 1 TABLET BY MOUTH EVERYDAY AT BEDTIME 90 tablet 1    aspirin 81 mg EC tablet Take by mouth.      blood-glucose sensor (FreeStyle Kalpana 3 Plus Sensor) device For continuous glucose monitoring.  [place] every 15 days 2 each 11    cloNIDine (Catapres) 0.1 mg tablet TAKE 1 TABLET BY MOUTH TWICE A  tablet 3    colchicine 0.6 mg tablet Take 1 tablet (0.6 mg) by mouth if needed for muscle/joint pain (gout). 60 tablet 0    empagliflozin (Jardiance) 25 mg tablet Take 1 tablet (25 mg) by mouth once  daily. 90 tablet 3    ezetimibe (Zetia) 10 mg tablet Take 1 tablet (10 mg) by mouth once daily. 90 tablet 3    famotidine (Pepcid) 20 mg tablet TAKE 1 TABLET BY MOUTH EVERY DAY AS DIRECTED 90 tablet 1    ferrous gluconate (Fergon) 324 (38 Fe) mg tablet Take 1 tablet (38 mg of iron) by mouth once daily with breakfast. 90 tablet 1    ferrous sulfate 325 (65 Fe) MG tablet Take 1 tablet (325 mg) by mouth early in the morning..      fish oil concentrate (Omega-3) 120-180 mg capsule Take 1 capsule (1 g) by mouth once daily.      FreeStyle Kalpana 3 [place] misc Use as instructed 1 each 0    glucosamine-chondroit-vit C-Mn 500-400 mg capsule Take by mouth.      lansoprazole (Prevacid) 15 mg DR capsule Take 1 capsule (15 mg) by mouth once daily.      multivitamin with folic acid (One Daily Multivitamin) 400 mcg tablet Take 1 tablet by mouth once daily.      OneTouch Ultra Test strip USE AS DIRECTED 3 TIMES A  strip 11    OneTouch Ultra Test strip 1 each by Does not apply route 3 times a day. 100 each 3    saw palmetto 450 mg capsule Take 1 capsule (450 mg) by mouth once daily.      tirzepatide (Mounjaro) 7.5 mg/0.5 mL pen injector Inject 7.5 mg under the skin 1 (one) time per week. 2 mL 1    turmeric-turmeric root extract 450-50 mg capsule Take 1 capsule by mouth once daily.      walker misc 1 each once daily. Rolator walker, he has ambulatory dysfunction 1 each 0    zinc gluconate 50 mg tablet Take 1 tablet (50 mg) by mouth once daily.      [DISCONTINUED] apixaban (Eliquis) 5 mg tablet Take 1 tablet (5 mg) by mouth 2 times a day. 180 tablet 3    [DISCONTINUED] carvedilol (Coreg) 25 mg tablet TAKE 1 TABLET BY MOUTH TWICE A DAY WITH MORNING AND EVENING MEAL 180 tablet 1    [DISCONTINUED] furosemide (Lasix) 40 mg tablet TAKE 1 TABLET BY MOUTH EVERY DAY 90 tablet 3    [DISCONTINUED] gabapentin (Neurontin) 300 mg capsule TAKE 2 CAPSULES BY MOUTH AT BEDTIME 180 capsule 3    [DISCONTINUED] glimepiride (Amaryl) 4 mg tablet Take  1 tablet (4 mg) by mouth 2 times a day before meals. 180 tablet 1    [DISCONTINUED] hydroCHLOROthiazide (HYDRODiuril) 25 mg tablet TAKE 1 TABLET BY MOUTH EVERY DAY 90 tablet 0    [DISCONTINUED] losartan (Cozaar) 100 mg tablet TAKE 1 TABLET BY MOUTH EVERY DAY 90 tablet 3    [DISCONTINUED] metFORMIN (Glucophage) 1,000 mg tablet TAKE 1 TABLET EVERY 12 HOURS DAILY 180 tablet 0    [DISCONTINUED] rosuvastatin (Crestor) 20 mg tablet TAKE 1 TABLET BY MOUTH EVERY DAY 90 tablet 3    triamcinolone (Kenalog) 0.1 % cream Apply topically 2 times a day.      [DISCONTINUED] clindamycin (Cleocin) 150 mg capsule Take 1 capsule (150 mg) by mouth every 6 hours. (Patient not taking: Reported on 5/5/2025)      [DISCONTINUED] colestipol (Colestid) 1 gram tablet TAKE 2 TABLETS BY MOUTH TWICE A DAY (Patient not taking: Reported on 5/5/2025) 360 tablet 5    [DISCONTINUED] FreeStyle Kalpana 3 Sensor device Change sensor every 14 days as directed (Patient not taking: Reported on 5/5/2025) 2 each 11     No current facility-administered medications on file prior to visit.

## 2025-05-05 NOTE — ASSESSMENT & PLAN NOTE
Patient takes Mounjaro now, he continues glimepiride , metformin and Jardiance also. Glucose readings increased after stopping Actos and Bydureon. Will give Mounjaro a chance to improve glucoses, concerned however that fasting glucoses are still in the 200s, lowest glucoses are in the 130s generally, recheck labs as ordered in 6/2025

## 2025-05-06 ENCOUNTER — TREATMENT (OUTPATIENT)
Dept: PHYSICAL THERAPY | Facility: HOSPITAL | Age: 75
End: 2025-05-06
Payer: MEDICARE

## 2025-05-06 DIAGNOSIS — M54.50 CHRONIC BILATERAL LOW BACK PAIN WITHOUT SCIATICA: ICD-10-CM

## 2025-05-06 DIAGNOSIS — G89.29 CHRONIC BILATERAL LOW BACK PAIN WITHOUT SCIATICA: ICD-10-CM

## 2025-05-06 DIAGNOSIS — M53.3 SACROILIAC JOINT DYSFUNCTION OF RIGHT SIDE: ICD-10-CM

## 2025-05-06 DIAGNOSIS — R26.89 FUNCTIONAL GAIT ABNORMALITY: Chronic | ICD-10-CM

## 2025-05-06 DIAGNOSIS — R29.898 WEAKNESS OF BOTH LEGS: ICD-10-CM

## 2025-05-06 PROCEDURE — 97110 THERAPEUTIC EXERCISES: CPT | Mod: GP | Performed by: PHYSICAL THERAPIST

## 2025-05-06 ASSESSMENT — PAIN SCALES - GENERAL: PAINLEVEL_OUTOF10: 2

## 2025-05-06 ASSESSMENT — PAIN - FUNCTIONAL ASSESSMENT: PAIN_FUNCTIONAL_ASSESSMENT: 0-10

## 2025-05-06 NOTE — PROGRESS NOTES
"Physical Therapy    Physical Therapy Treatment / discharge    Patient Name: Isauro Valdez  MRN: [id number]  Today's Date: 5/6/2025  Time Calculation  Start Time: 0915  Stop Time: 1000  Time Calculation (min): 45 min  1950     PT Therapeutic Procedures Time Entry  Therapeutic Exercise Time Entry: 45           Visit: # 4      Assessment:  Pt. Has progressed with strength, and ROM. He cont. With postural abnormalities and observable scoliotic posture. Pt. Is also with decrease pain. He was encouraged to cont his program at local gym, and Indep HEP.          Plan:  Discharge PT     Current Problem  1. Weakness of both legs  Follow Up In Physical Therapy      2. Sacroiliac joint dysfunction of right side  Follow Up In Physical Therapy      3. Chronic bilateral low back pain without sciatica  Follow Up In Physical Therapy      4. Functional gait abnormality  Follow Up In Physical Therapy          Subjective   Pt. And wife state they are members of the [facility] [employer], but havent been there in a while. Pt. Is looking to cont. There. He is with less back pain and hip pain since \"staying limber and moving around\" in PT. He cont with increase R sided LBP into hip depending on how much bending he is doing. He does bend to hook up his little dog.          Pain  Pain Assessment: 0-10  0-10 (Numeric) Pain Score: 2  Pain Location: Hip  Pain Orientation: Right, Left    Objective   Pt. Educated on reacher to avoid bending repetitively, pt. Educated to sit in chair and bend to hook up his little dog.     Posture: increased thoracic kyphosis, forward head, rounded shoulders, decreased lumbar lordosis    Spine ROM: WNL Unless documented below:  ROM (In degrees)   Flexion 45   Extension 5    Right Left   Side Bend 20 15   Rotation 25 25       Lower Extremity Strength: (5/5 unless noted)   RIGHT LEFT   Hip Flexion 4+-5 4+-5   Hip Abduction 5- 5-   Knee Extension 5 5   Knee Flexion 5 5   Ankle DF 5 5     Joint " "mobility general stiffness in the lumbar spine  Palpation: TTP bilat lumbar area and into the R hip  Integument: Ulcer of R big toe  Neurological symptoms N/T in bilat feet.   Special tests:  Slump R(+)      Response to Sandra Repeated Testing:  Neutral    Gait: forward flexed posture, mikie hip externally rotated, R worse than L. Decreased step length, decreased hip/knee flexion during swing and lacks hip extension during push-off bilaterally, Indep    Stairs: reciprocal and non using 2 HRs       Treatments:     EXERCISES Date 4/11/2025   Date 4/22/25   Visit #2 Date 4/29/25 Recheck  Date  5/5/25    REPS REPS REPS REPS          Nustep L4 10 min 10' @L4 10' L4 10' L4                  Shuttle  DLP   10x2 5B 6B 10x2   Shuttle SLP   10x2 4B 6B 10x2 ea0   Shuttle TR/HR              Qhip Flexion (teach for gym)  10 x 2 // bars 10 x 2 // bars    Qhip Extension (teach for gym)  10 x 2 // bars 10 x 2 // bars    Qhip Abduction (teach for gym)  10 x 2  // bars  10 x 2 // bars                  Q Quad (teach for gym)  10 x 2 seated 10 x 2 seated    Q Hamstring  (teach for gym)  10 x 2 rtb 10 x 2 seated RTB           Parallel bars:       3-way SLR       Step-ups       Squats vs. Sit <> stands       RB calf stretch  Hs stretch mikie   30\" x 3   20\" x 3 30\"x3  20\"x3 30\"x3   Lateral walking   2 laps 2 laps    Backwards walking   2 laps 2 lap[s                                      HEP Access Code: [email or web address]             outcome: Oswestry: 9 raw       Goals:  Resolved       PT Problem       PT Goal 1 (Met)       Start:  04/11/25    Expected End:  05/23/25    Resolved:  05/06/25    Pt will be able to teach back 90% of HEP in order to maintain gains made in PT.          PT Goal 2 (Met)       Start:  04/11/25    Expected End:  05/23/25    Resolved:  05/06/25    Pt will increase LE strength to 4+/5 or greater in order to improve gait and tolerance to functional activities.          PT Goal 3 (Met)       Start:  04/11/25    Expected End:  " 05/23/25    Resolved:  05/06/25    Pt will decrease pain to 4/10 at worst, with pt able to manage symptoms with HEP, positioning and modalities independently.          PT Goal 4 (Adequate for Discharge)       Start:  04/11/25    Expected End:  05/23/25       Pt will increase lumbar ROM by 10 degrees in every plane in order to reduce pain and improve tolerance to functional mobility.          PT Goal 5 (Met)       Start:  04/11/25    Expected End:  05/02/25    Resolved:  05/06/25    Pt will be able to teach back 50% of HEP in order to maintain gains made in PT.

## 2025-05-16 ENCOUNTER — APPOINTMENT (OUTPATIENT)
Dept: PRIMARY CARE | Facility: CLINIC | Age: 75
End: 2025-05-16
Payer: MEDICARE

## 2025-05-16 DIAGNOSIS — E11.42 TYPE 2 DIABETES MELLITUS WITH DIABETIC POLYNEUROPATHY, WITHOUT LONG-TERM CURRENT USE OF INSULIN: ICD-10-CM

## 2025-05-16 RX ORDER — TIRZEPATIDE 10 MG/.5ML
10 INJECTION, SOLUTION SUBCUTANEOUS
Qty: 2 ML | Refills: 1 | Status: SHIPPED | OUTPATIENT
Start: 2025-05-16

## 2025-05-16 NOTE — PROGRESS NOTES
Pharmacist Clinic: Diabetes Management  Isauro Valdez is a 74 y.o. male was referred to Clinical Pharmacy Team for diabetes management.   Referring Provider: Javier Hong, *  - Last visit with referring provider:  5/5/25     Patient Assistance for Mounjaro, Eliquis, Jardiance approved through 1/23/26. Will have to be renewed prior to that date to prevent lapse in coverage. Medication(s) will be received at no cost to patient from [facility].     Subjective     HPI    Current Diabetes Pharmacotherapy:    - Mounjaro 7.5 mg weekly - Wednesdays (1 dose left)  - Jardiance 25 mg daily  - Metformin 1,000 mg BID  - Glimepiride 4 mg BID with meals    Social History:  Current diet:   - B: english muffin  - L: soup; left overs  - D: chicken; asparagus; squash  - Trying to switch to mediterranean diet  - Seeing a nutritionist   - Cookies throughout the day  - Sugar free thin mint before bed  - Half a pb sandwich before bed   - Denies pop   - Drinks dark cherry juice for gout sometimes     Current exercise:   - None    Current monitoring regimen:   Patient is using: continuous glucose monitor  Type of CGM: Kalpana 3 plus - reader    Reported blood sugars:     30 day report:  - Above: 63%  - Target: 37%  - Below: 0%    14 day report:  - Above: 61%  - Target: 39%  - Below: 0%    7 day report:  - Above: 62%  - Target: 38%  - Below: 0%    Any episodes of hypoglycemia? No    Adverse Effects: None    Objective     There were no vitals taken for this visit.    Allergies   Allergen Reactions    Adhesive Tape-Silicones Unknown    Niacin Hives and Unknown    Nystatin Other    Penicillins Hives and Unknown       Historical Diabetes Pharmacotherapy:  None    SECONDARY PREVENTION  - Statin? Yes   - ACE-I/ARB? Yes  - Aspirin? Yes    Pertinent PMH Review:  - PMH of Pancreatitis: No  - PMH of Retinopathy: No  - PMH of Urinary Tract Infections: No  - PMH of Yeast Infections: No  - PMH of MTC: No    Lab Review  Lab Results    Component Value Date    BILITOT 0.5 03/24/2025    CALCIUM 10.9 (H) 03/24/2025    CO2 29 03/24/2025     03/24/2025    CREATININE 1.18 03/24/2025    GLUCOSE 214 (H) 03/24/2025    ALKPHOS 67 03/24/2025    K 3.9 03/24/2025    PROT 7.4 03/24/2025     03/24/2025    AST 43 (H) 03/24/2025    ALT 72 (H) 03/24/2025    BUN 29 (H) 03/24/2025    ANIONGAP 13 03/24/2025    MG 2.0 03/24/2025    ALBUMIN 4.7 03/24/2025    GFRMALE 62 06/12/2023     Lab Results   Component Value Date    EGFR 66 03/24/2025      Lab Results   Component Value Date    TRIG 156 (H) 03/24/2025    CHOL 148 03/24/2025    HDL 36 (L) 03/24/2025     Lab Results   Component Value Date    HGBA1C 9.0 (H) 03/05/2025    HGBA1C 7.6 (H) 10/23/2024    HGBA1C 7.4 (H) 05/13/2024     The ASCVD Risk score (Skip RENAE, et al., 2019) failed to calculate for the following reasons:    Risk score cannot be calculated because patient has a medical history suggesting prior/existing ASCVD    Drug Interactions:  - None    Affordability/Accessibility:  - Enrolled in [facility]    Preferred Pharmacy:  - [facility]    Assessment/Plan   Problem List Items Addressed This Visit       Type 2 diabetes mellitus    Relevant Medications    tirzepatide (Mounjaro) 10 mg/0.5 mL pen injector    Other Relevant Orders    Referral to Clinical Pharmacy       ASSESSMENT:  Patients diabetes is uncontrolled with most recent A1c of 9.0%.   (Goal A1c < 7.5%)    C peptide and fasting insulin came back normal for a type 2 diabetic. Time in range has improved. Went from 8% to 39%. Seems like Mounjaro is helping with blood sugars. At this time I will hold off on adding insulin unless we get to max dose of Mounjaro and BG is still running high. Will increase Mounjaro to 10 mg today.      PLAN:  INCREASE Mounjaro to 10 mg weekly  CONTINUE all other DM medications  Follow up with clinical pharmacist: 6/13/25 @ 3:40  Follow up with PCP: 8/20/25    Thank you,  Katherin Velasquez, PharmD  Clinical Pharmacy  Specialist  [phone]  [email or web address]     Continue all meds under the continuation of care with the referring provider and clinical pharmacy team.

## 2025-05-24 PROCEDURE — RXMED WILLOW AMBULATORY MEDICATION CHARGE

## 2025-05-28 ENCOUNTER — PHARMACY VISIT (OUTPATIENT)
Dept: PHARMACY | Facility: CLINIC | Age: 75
End: 2025-05-28
Payer: MEDICARE

## 2025-05-29 ENCOUNTER — APPOINTMENT (OUTPATIENT)
Facility: HOSPITAL | Age: 75
End: 2025-05-29
Payer: MEDICARE

## 2025-06-03 ENCOUNTER — DOCUMENTATION (OUTPATIENT)
Dept: PHARMACY | Facility: HOSPITAL | Age: 75
End: 2025-06-03
Payer: MEDICARE

## 2025-06-03 LAB
ALBUMIN SERPL-MCNC: 4.7 G/DL (ref 3.6–5.1)
ALBUMIN/CREAT UR: 8 MG/G CREAT
ALP SERPL-CCNC: 62 U/L (ref 35–144)
ALT SERPL-CCNC: 53 U/L (ref 9–46)
ANION GAP SERPL CALCULATED.4IONS-SCNC: 12 MMOL/L (CALC) (ref 7–17)
AST SERPL-CCNC: 40 U/L (ref 10–35)
BASOPHILS # BLD AUTO: 61 CELLS/UL (ref 0–200)
BASOPHILS NFR BLD AUTO: 0.9 %
BILIRUB SERPL-MCNC: 0.6 MG/DL (ref 0.2–1.2)
BUN SERPL-MCNC: 34 MG/DL (ref 7–25)
CALCIUM SERPL-MCNC: 11.3 MG/DL (ref 8.6–10.3)
CHLORIDE SERPL-SCNC: 96 MMOL/L (ref 98–110)
CHOLEST SERPL-MCNC: 96 MG/DL
CHOLEST/HDLC SERPL: 3.3 (CALC)
CO2 SERPL-SCNC: 28 MMOL/L (ref 20–32)
CREAT SERPL-MCNC: 1.46 MG/DL (ref 0.7–1.28)
CREAT UR-MCNC: 88 MG/DL (ref 20–320)
EGFRCR SERPLBLD CKD-EPI 2021: 51 ML/MIN/1.73M2
EOSINOPHIL # BLD AUTO: 258 CELLS/UL (ref 15–500)
EOSINOPHIL NFR BLD AUTO: 3.8 %
ERYTHROCYTE [DISTWIDTH] IN BLOOD BY AUTOMATED COUNT: 13.8 % (ref 11–15)
EST. AVERAGE GLUCOSE BLD GHB EST-MCNC: 212 MG/DL
EST. AVERAGE GLUCOSE BLD GHB EST-SCNC: 11.7 MMOL/L
GLUCOSE SERPL-MCNC: 211 MG/DL (ref 65–99)
HBA1C MFR BLD: 9 %
HCT VFR BLD AUTO: 45.9 % (ref 38.5–50)
HDLC SERPL-MCNC: 29 MG/DL
HGB BLD-MCNC: 15.2 G/DL (ref 13.2–17.1)
IRON SATN MFR SERPL: 21 % (CALC) (ref 20–48)
IRON SERPL-MCNC: 91 MCG/DL (ref 50–180)
LDLC SERPL CALC-MCNC: 41 MG/DL (CALC)
LYMPHOCYTES # BLD AUTO: 1700 CELLS/UL (ref 850–3900)
LYMPHOCYTES NFR BLD AUTO: 25 %
MCH RBC QN AUTO: 30.5 PG (ref 27–33)
MCHC RBC AUTO-ENTMCNC: 33.1 G/DL (ref 32–36)
MCV RBC AUTO: 92 FL (ref 80–100)
MICROALBUMIN UR-MCNC: 0.7 MG/DL
MONOCYTES # BLD AUTO: 673 CELLS/UL (ref 200–950)
MONOCYTES NFR BLD AUTO: 9.9 %
NEUTROPHILS # BLD AUTO: 4107 CELLS/UL (ref 1500–7800)
NEUTROPHILS NFR BLD AUTO: 60.4 %
NONHDLC SERPL-MCNC: 67 MG/DL (CALC)
PLATELET # BLD AUTO: 227 THOUSAND/UL (ref 140–400)
PMV BLD REES-ECKER: 9.5 FL (ref 7.5–12.5)
POTASSIUM SERPL-SCNC: 3.8 MMOL/L (ref 3.5–5.3)
PROT SERPL-MCNC: 7.4 G/DL (ref 6.1–8.1)
RBC # BLD AUTO: 4.99 MILLION/UL (ref 4.2–5.8)
SODIUM SERPL-SCNC: 136 MMOL/L (ref 135–146)
TIBC SERPL-MCNC: 439 MCG/DL (CALC) (ref 250–425)
TRIGL SERPL-MCNC: 180 MG/DL
TSH SERPL-ACNC: 4.05 MIU/L (ref 0.4–4.5)
WBC # BLD AUTO: 6.8 THOUSAND/UL (ref 3.8–10.8)

## 2025-06-03 PROCEDURE — RXMED WILLOW AMBULATORY MEDICATION CHARGE

## 2025-06-03 NOTE — PROGRESS NOTES
Lab Results   Component Value Date    HGBA1C 9.0 (H) 06/02/2025       Patient's A1c has remained the same and not at goal. This is expected as we are just now getting improvement in blood sugars with Mounjaro. Will discuss results with patient at next scheduled appointment.    Katherin Velasquez, PharmD  Clinical Pharmacy Specialist - Primary Care  [phone]  [email or web address]

## 2025-06-05 ENCOUNTER — PHARMACY VISIT (OUTPATIENT)
Dept: PHARMACY | Facility: CLINIC | Age: 75
End: 2025-06-05
Payer: MEDICARE

## 2025-06-07 DIAGNOSIS — M10.9 GOUTY ARTHRITIS OF LEFT HAND: ICD-10-CM

## 2025-06-07 DIAGNOSIS — E79.0 HYPERURICEMIA: ICD-10-CM

## 2025-06-07 DIAGNOSIS — E61.1 IRON DEFICIENCY: ICD-10-CM

## 2025-06-09 RX ORDER — ALLOPURINOL 100 MG/1
200 TABLET ORAL DAILY
Qty: 180 TABLET | Refills: 1 | Status: SHIPPED | OUTPATIENT
Start: 2025-06-09

## 2025-06-09 RX ORDER — FERROUS GLUCONATE 324(38)MG
TABLET ORAL
Qty: 90 TABLET | Refills: 1 | Status: SHIPPED | OUTPATIENT
Start: 2025-06-09

## 2025-06-11 DIAGNOSIS — E78.5 HYPERLIPIDEMIA, UNSPECIFIED: ICD-10-CM

## 2025-06-11 DIAGNOSIS — E66.9 OBESITY, UNSPECIFIED: ICD-10-CM

## 2025-06-11 RX ORDER — COLESTIPOL HYDROCHLORIDE 1 G/1
2 TABLET ORAL 2 TIMES DAILY
Qty: 360 TABLET | Refills: 5 | OUTPATIENT
Start: 2025-06-11

## 2025-06-11 RX ORDER — FAMOTIDINE 20 MG/1
20 TABLET, FILM COATED ORAL DAILY
Qty: 90 TABLET | Refills: 1 | Status: SHIPPED | OUTPATIENT
Start: 2025-06-11

## 2025-06-12 ENCOUNTER — OFFICE VISIT (OUTPATIENT)
Facility: HOSPITAL | Age: 75
End: 2025-06-12
Payer: MEDICARE

## 2025-06-12 VITALS — SYSTOLIC BLOOD PRESSURE: 115 MMHG | HEART RATE: 98 BPM | RESPIRATION RATE: 20 BRPM | DIASTOLIC BLOOD PRESSURE: 76 MMHG

## 2025-06-12 DIAGNOSIS — M54.50 CHRONIC BILATERAL LOW BACK PAIN WITHOUT SCIATICA: ICD-10-CM

## 2025-06-12 DIAGNOSIS — M53.3 SACROILIAC JOINT DYSFUNCTION OF RIGHT SIDE: Primary | ICD-10-CM

## 2025-06-12 DIAGNOSIS — G89.29 CHRONIC BILATERAL LOW BACK PAIN WITHOUT SCIATICA: ICD-10-CM

## 2025-06-12 PROCEDURE — 4010F ACE/ARB THERAPY RXD/TAKEN: CPT | Performed by: PHYSICAL MEDICINE & REHABILITATION

## 2025-06-12 PROCEDURE — 99213 OFFICE O/P EST LOW 20 MIN: CPT | Performed by: PHYSICAL MEDICINE & REHABILITATION

## 2025-06-12 PROCEDURE — 1159F MED LIST DOCD IN RCRD: CPT | Performed by: PHYSICAL MEDICINE & REHABILITATION

## 2025-06-12 PROCEDURE — 1125F AMNT PAIN NOTED PAIN PRSNT: CPT | Performed by: PHYSICAL MEDICINE & REHABILITATION

## 2025-06-12 PROCEDURE — 3078F DIAST BP <80 MM HG: CPT | Performed by: PHYSICAL MEDICINE & REHABILITATION

## 2025-06-12 PROCEDURE — 3074F SYST BP LT 130 MM HG: CPT | Performed by: PHYSICAL MEDICINE & REHABILITATION

## 2025-06-12 PROCEDURE — 1160F RVW MEDS BY RX/DR IN RCRD: CPT | Performed by: PHYSICAL MEDICINE & REHABILITATION

## 2025-06-12 ASSESSMENT — PAIN SCALES - GENERAL: PAINLEVEL_OUTOF10: 2

## 2025-06-12 NOTE — PROGRESS NOTES
Physical Medicine and Rehabilitation MSK Consult  06/12/25  Referred by PCP Dr. Hong     Chief Complaint:  Low back pain     HPI:  Isauro Valdez is a 74 y.o. male with a past medical history of A-fib (on Eliquis), HLD, CAD, CHF, DM II (w/ diabetic neuropathy), GERD, and CKD Stage III who presents to the clinic today for evaluation of low back pain.    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 No current HEP  Medications taken to date for this complaint include the following:   Gabapentin 600mg QHS, Acetaminophen  Prior injections: None recently    Prior surgical intervention: Prior microdiscectomy approximately 15 years ago    He was last seen in march 2025. At that time we discussed:  Referral to physical therapy with focus on muscle strengthening and obtaining a home exercise program  Lumbar exercises and stretches provided today for patient to get started  Can also try over the counter SI joint belt, information provided  Can try over the counter Lidocaine cream/roll on medication  Consider advanced images in the future if considering interventional procedures  On Eliquis, will avoid NSAIDs  CKD Stage 3, last GFR 66. Avoid nephrotoxic medications  Follow-up 2 months    Completed 4 sessions of therapy. SIJ belt does help. Wears as needed. Tried lidocaine roll on which helps.   States w better weather its better.  States does exercises sometimes.     IMAGING:  Reviewed 3/2025 w patient and personally  === 01/06/25 ===    XR LUMBAR SPINE COMPLETE 4+  VIEWS    - Impression -  Moderate spondylosis, mildly progressed from the prior exam.     Past Medical History:   Diagnosis Date    Cellulitis of left lower limb 09/06/2019    Cellulitis of left lower extremity    Corns and callosities 09/06/2019    Callus of foot    Coronary angioplasty status 09/06/2019    History of PTCA 1    Old myocardial infarction 09/06/2019    History of myocardial infarction    Other forms of dyspnea 02/19/2020    Dyspnea on exertion    Personal history of diabetic foot ulcer 09/06/2019    History of diabetic ulcer of foot    Personal history of other diseases of the circulatory system 10/07/2019    History of hypertension    Personal history of other drug therapy 09/06/2019    History of influenza vaccination    Personal history of other drug therapy 09/06/2019    History of hepatitis B vaccination    Personal history of other drug therapy 09/06/2019    History of pneumococcal vaccination    Personal history of other endocrine, nutritional and metabolic disease     History of type 2 diabetes mellitus    Personal history of other specified conditions 09/06/2019    History of persistent cough    Personal history of urinary calculi     History of kidney stones    Personal history of urinary calculi 09/06/2019    History of nephrolithiasis    Rash and other nonspecific skin eruption 12/27/2018    Rash of groin    Type 2 diabetes mellitus with diabetic nephropathy 10/07/2019    Type 2 diabetes mellitus with diabetic nephropathy        Past Surgical History:   Procedure Laterality Date    ANKLE SURGERY  05/17/2017    Ankle Surgery    CORONARY ANGIOPLASTY  05/17/2017    PTCA    FOOT SURGERY  06/12/2018    Foot Surgery    KNEE ARTHROSCOPY W/ DEBRIDEMENT  05/17/2017    Arthroscopy Knee    LITHOTRIPSY  05/17/2017    Renal Lithotripsy    LUMBAR LAMINECTOMY  05/17/2017    Laminectomy Lumbar    OTHER SURGICAL HISTORY  05/17/2017    Biopsy Of Liver    OTHER SURGICAL HISTORY  09/01/2022    Tonsillectomy  with adenoidectomy        Patient Active Problem List    Diagnosis Date Noted    Callus of foot 05/05/2025    Weakness of both legs 04/11/2025    Heart failure with recovered ejection fraction (HFrecEF) 03/28/2025    CKD (chronic kidney disease) 03/28/2025    On apixaban therapy 09/18/2024    Thoracic ascending aortic aneurysm 09/18/2024    Stage 3a chronic kidney disease (Multi) 08/19/2024    Functional gait abnormality 03/04/2024    Poor balance 03/04/2024    Hemorrhoids 02/14/2024    Polyp of colon 02/14/2024    Hyperuricemia 01/09/2024    Gouty arthritis of left hand 01/09/2024    Gouty arthritis of toe 12/18/2023    Routine general medical examination at health care facility 06/19/2023    ACE-inhibitor cough 06/06/2023    Dysuria 06/06/2023    Nodule in muscle 06/06/2023    Unknown cause of injury 03/30/2023    Obstructive sleep apnea syndrome in adult 03/30/2023    PAF (paroxysmal atrial fibrillation) (Multi) 03/30/2023    Personal history of colonic polyps 03/30/2023    Poorly controlled type 2 diabetes mellitus (Multi) 03/30/2023    Type 2 diabetes mellitus 03/30/2023    Presence of coronary angioplasty implant and graft 03/30/2023    Primary osteoarthritis of left hand 03/30/2023    Renal mass 03/30/2023    Restless legs syndrome 03/30/2023    Trigger middle finger of left hand 03/30/2023    Swelling of left hand 03/30/2023    Seasonal allergies 03/30/2023    Obesity (BMI 30-39.9) 03/30/2023    Low back pain 03/30/2023    Joint swelling 03/30/2023    Fatty liver 03/30/2023    History of cardiomyopathy 03/30/2023    Hypercalcemia 03/30/2023    Hyperlipidemia 03/30/2023    Anemia 03/30/2023    ASHD (arteriosclerotic heart disease) 03/30/2023    Hypertrophy of inter-atrial septum 03/30/2023    Atopic dermatitis 03/30/2023    Benign essential hypertension 03/30/2023    BPH (benign prostatic hyperplasia) 03/30/2023    Cardiac disease 03/30/2023    Cellulitis of hand, left 03/30/2023    Chronic combined systolic  and diastolic CHF (congestive heart failure) 03/30/2023    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 (Multi) 03/30/2023    Chronic GERD 03/30/2023    Disorder of iron metabolism 03/30/2023    Diverticulosis of large intestine 06/28/2022        Family History   Problem Relation Name Age of Onset    Heart attack Other          Current Outpatient Medications   Medication Sig Dispense Refill    allopurinol (Zyloprim) 100 mg tablet TAKE 2 TABLETS BY MOUTH ONCE DAILY. 180 tablet 1    amLODIPine (Norvasc) 2.5 mg tablet TAKE 1 TABLET BY MOUTH EVERYDAY AT BEDTIME 90 tablet 1    apixaban (Eliquis) 5 mg tablet Take 1 tablet (5 mg) by mouth 2 times a day. 180 tablet 3    aspirin 81 mg EC tablet Take by mouth.      blood-glucose sensor (FreeStyle Kalpana 3 Plus Sensor) device For continuous glucose monitoring.  [place] every 15 days 2 each 11    carvedilol (Coreg) 25 mg tablet Take 1 tablet (25 mg) by mouth 2 times a day after meals. 180 tablet 1    cloNIDine (Catapres) 0.1 mg tablet TAKE 1 TABLET BY MOUTH TWICE A  tablet 3    colchicine 0.6 mg tablet Take 1 tablet (0.6 mg) by mouth if needed for muscle/joint pain (gout). 60 tablet 0    empagliflozin (Jardiance) 25 mg tablet Take 1 tablet (25 mg) by mouth once daily. 90 tablet 3    ezetimibe (Zetia) 10 mg tablet Take 1 tablet (10 mg) by mouth once daily. 90 tablet 3    famotidine (Pepcid) 20 mg tablet TAKE 1 TABLET BY MOUTH EVERY DAY AS DIRECTED 90 tablet 1    ferrous sulfate 325 (65 Fe) MG tablet Take 1 tablet by mouth early in the morning..      fish oil concentrate (Omega-3) 120-180 mg capsule Take 1 capsule (1 g) by mouth once daily.      FreeStyle Kalpana 3 [place] misc Use as instructed 1 each 0    furosemide (Lasix) 40 mg tablet Take 1 tablet (40 mg) by mouth once daily. 90 tablet 3    gabapentin (Neurontin) 300 mg capsule Take 2 capsules (600 mg) by mouth once daily at bedtime. 180 capsule 3    glimepiride (Amaryl) 4 mg tablet Take 1 tablet (4 mg) by  mouth 2 times a day before meals. 180 tablet 1    glucosamine-chondroit-vit C-Mn 500-400 mg capsule Take by mouth.      hydroCHLOROthiazide (HYDRODiuril) 25 mg tablet Take 1 tablet (25 mg) by mouth once daily. 90 tablet 0    lansoprazole (Prevacid) 15 mg DR capsule Take 1 capsule (15 mg) by mouth once daily.      losartan (Cozaar) 100 mg tablet Take 1 tablet (100 mg) by mouth once daily. 90 tablet 3    metFORMIN (Glucophage) 1,000 mg tablet Take 1 tablet (1,000 mg) by mouth every 12 hours. 180 tablet 0    multivitamin with folic acid (One Daily Multivitamin) 400 mcg tablet Take 1 tablet by mouth once daily.      OneTouch Ultra Test strip USE AS DIRECTED 3 TIMES A  strip 11    OneTouch Ultra Test strip 1 each by Does not apply route 3 times a day. 100 each 3    rosuvastatin (Crestor) 20 mg tablet Take 1 tablet (20 mg) by mouth once daily. 90 tablet 3    saw palmetto 450 mg capsule Take 1 capsule (450 mg) by mouth once daily.      tirzepatide (Mounjaro) 10 mg/0.5 mL pen injector Inject 10 mg under the skin every 7 days. 2 mL 1    turmeric-turmeric root extract 450-50 mg capsule Take 1 capsule by mouth once daily.      walker misc 1 each once daily. Rolator walker, he has ambulatory dysfunction 1 each 0    zinc gluconate 50 mg tablet Take 1 tablet by mouth once daily.      ferrous gluconate 324 (38 Fe) MG tablet TAKE 1 TABLET (38 MG OF IRON) BY MOUTH ONCE DAILY WITH BREAKFAST. (Patient not taking: Reported on 6/12/2025) 90 tablet 1     No current facility-administered medications for this visit.        Allergies   Allergen Reactions    Adhesive Tape-Silicones Unknown    Niacin Hives and Unknown    Nystatin Other    Penicillins Hives and Unknown        Social History     Socioeconomic History    Marital status:    Tobacco Use    Smoking status: Former     Types: Cigarettes     Passive exposure: Never    Smokeless tobacco: Never    Tobacco comments:     Quit smoking in 1990   Vaping Use    Vaping status:  Never Used   Substance and Sexual Activity    Alcohol use: Not Currently     Comment: use to drink 1 beer a month now nothing since 2023    Drug use: Never     Social Drivers of Health     Food Insecurity: No Food Insecurity (5/5/2025)    Hunger Vital Sign     Worried About Running Out of Food in the Last Year: Never true     Ran Out of Food in the Last Year: Never true        Review of Systems:  Constitutional:  Denies fever or chills, malaise, weight changes.   Eyes:  Denies change in visual acuity   HENT:  Denies nasal congestion or sore throat   Respiratory:  Denies cough or shortness of breath   Cardiovascular:  Denies chest pain or edema   GI:  Denies abdominal pain, nausea, vomiting, bloody stools or diarrhea   :  Denies dysuria   Integument:  Denies rash   Neurologic:  As per HPI  MSK: Per above HPI  Endocrine:  Denies polyuria or polydipsia   Lymphatic:  Denies swollen glands   Psychiatric:  Denies depression or anxiety            PHYSICAL EXAM:  /76 (BP Location: Left arm, Patient Position: Sitting)   Pulse 98   Resp 20     General:  NAD, well developed, male    Psychiatric: appropriate mood & affect.   Cardiovascular:  Normal pedal pulses; no LE edema.  Respiratory:  Normal rate; unlabored breathing.  Skin:  Intact; no erythema; no ecchymosis or rash.  Lymphatic:  No lymphadenopathy or lymphedema.  NEURO:  Alert and appropriate. Speech fluent, conversing appropriately.   Motor:    Rt: HF 4/5, KE 5/5, KF 5/5, DF 5/5, EHL 4/5, PF 5/5.    Lt: HF 4/5, KE 5/5, KF 5/5, DF 5/5, EHL 5/5, PF 5/5.  Sensation:     Light touch: diminished over dorsum surface of bilateral feet  Reflexes:     Right:  patellar 1+, achilles 1+,     Left: patellar 1+, achilles 1+,     Negative Angel's  Gait: Normal, narrow based gait. Utilizing straight cane   MSK:  Inspection reveals no evidence of a pelvic obliqity   Spinal range of motion: moderate axial low back pain with lumbar extension, full otherwise  There is  tenderness over the right lumbar paraspinals and right gluteal musculature  Special tests:    Straight leg raise: negative    Slump test: negative    Facet loading: positive R>L    MAURIZIO's: positive on right over SI joint     IMPRESSION:    Isauro Valdez is a 74 y.o. male with a past medical history of A-fib (on Eliquis), HLD, CAD, CHF, DM II (w/ diabetic neuropathy), GERD, and CKD Stage III who presents to the clinic today for evaluation of low back pain. Physical exam is reassuring for lack of neurologic compromise. Pain is likely multifactorial with component of facet arthropathy, lumbar spondylosis, Right SI joint dysfunction, and gluteal musculature tendonitis.     - Patient Education: Extensive time was spent educating the patient on relevant anatomy, clinical findings and imaging, as well as discussing the potential diagnoses as discussed above.     Referral to physical therapy with focus on muscle strengthening and obtaining a home exercise program  Lumbar exercises and stretches provided today for patient to get started  Can also try over the counter SI joint belt, information provided  Can try over the counter Lidocaine cream/roll on medication  Consider advanced images in the future if considering interventional procedures  On Eliquis, will avoid NSAIDs  CKD Stage 3, last GFR 66. Avoid nephrotoxic medications  Follow-up 2 months    The patient expressed understanding and agreement with the assessment and plan. Patient encouraged to contact us should they have any questions, concerns, or any changes in symptoms.     Thank you for allowing me to participate in the care of your patient.    Shiloh Michel DO   PM&R PGY4    Patient seen and examined with Dr. Diego Small who agrees with assessment and plan.     ** Dictated with voice recognition software, please forgive any errors in grammar and/or spelling **       Roma Small MD     Division of PM&R

## 2025-06-12 NOTE — PROGRESS NOTES
Pharmacist Clinic: Diabetes Management  Isauro Valdez is a 74 y.o. male was referred to Clinical Pharmacy Team for diabetes management.   Referring Provider: Javier Hong, *  - Last visit with referring provider:  5/5/25     Patient Assistance for Mounjaro Elieber Jardiance approved through 1/23/26. Will have to be renewed prior to that date to prevent lapse in coverage. Medication(s) will be received at no cost to patient from [facility].     Subjective     HPI    Current Diabetes Pharmacotherapy:    - Mounjaro 10 mg weekly - Thursday (3 doses so far)  - Jardiance 25 mg daily  - Metformin 1,000 mg BID  - Glimepiride 4 mg BID with meals    Social History:  Current diet:   - B: english muffin  - L: soup; left overs  - D: chicken; asparagus; squash  - Trying to switch to mediterranean diet  - Seeing a nutritionist   - Cookies throughout the day  - Sugar free thin mint before bed  - Half a pb sandwich before bed   - Denies pop   - Drinks dark cherry juice for gout sometimes     Current exercise:   - None    Current monitoring regimen:   Patient is using: continuous glucose monitor  Type of CGM: Kalpana 3 plus - reader    Reported blood sugars:   - Recently has been sick     30 day report:  - Above: 70%  - Target: 30%  - Below: 0%    14 day report:  - Above: 70%  - Target: 30%  - Below: 0%    7 day report:  - Above: 67%  - Target: 33%  - Below: 0%    Any episodes of hypoglycemia? No  - Lowest was 99     Adverse Effects: None    Objective     There were no vitals taken for this visit.    Allergies   Allergen Reactions    Adhesive Tape-Silicones Unknown    Niacin Hives and Unknown    Nystatin Other    Penicillins Hives and Unknown       Historical Diabetes Pharmacotherapy:  None    SECONDARY PREVENTION  - Statin? Yes   - ACE-I/ARB? Yes  - Aspirin? Yes    Pertinent PMH Review:  - PMH of Pancreatitis: No  - PMH of Retinopathy: No  - PMH of Urinary Tract Infections: No  - PMH of Yeast Infections: No  -  "PMH of [facility]: No    Lab Review  Lab Results   Component Value Date    BILITOT 0.6 06/02/2025    CALCIUM 11.3 (H) 06/02/2025    CO2 28 06/02/2025    CL 96 (L) 06/02/2025    CREATININE 1.46 (H) 06/02/2025    GLUCOSE 211 (H) 06/02/2025    ALKPHOS 62 06/02/2025    K 3.8 06/02/2025    PROT 7.4 06/02/2025     06/02/2025    AST 40 (H) 06/02/2025    ALT 53 (H) 06/02/2025    BUN 34 (H) 06/02/2025    ANIONGAP 12 06/02/2025    MG 2.0 03/24/2025    ALBUMIN 4.7 06/02/2025    GFRMALE 62 06/12/2023     Lab Results   Component Value Date    EGFR 51 (L) 06/02/2025      Lab Results   Component Value Date    TRIG 180 (H) 06/02/2025    CHOL 96 06/02/2025    HDL 29 (L) 06/02/2025     Lab Results   Component Value Date    HGBA1C 9.0 (H) 06/02/2025    HGBA1C 9.0 (H) 03/05/2025    HGBA1C 7.6 (H) 10/23/2024     The ASCVD Risk score (Skip RENAE, et al., 2019) failed to calculate for the following reasons:    Risk score cannot be calculated because patient has a medical history suggesting prior/existing ASCVD    Drug Interactions:  - None    Affordability/Accessibility:  - Enrolled in [facility]    Preferred Pharmacy:  - [facility]    Assessment/Plan   Problem List Items Addressed This Visit       Type 2 diabetes mellitus    Relevant Medications    pen needle, diabetic 32 gauge x 5/32\" needle    insulin glargine (Lantus Solostar U-100 Insulin) 100 unit/mL (3 mL) pen    Other Relevant Orders    Referral to Clinical Pharmacy     ASSESSMENT:  Patients diabetes is uncontrolled with most recent A1c of 9.0%.   (Goal A1c < 7.5%)    Patients time in target has no improved even with dose increase of Mounjaro to 10 mg. At this time, we will go ahead and add 10 units of Lantus at bedtime. He is to call me if experiencing any lows. He follows with Dr. Harry on 6/23.      PLAN:  START Lantus 10 units every night  CONTINUE all other DM medications  Follow up with clinical pharmacist: 7/11/25 @ 3:30  Follow up with PCP: 8/20/25    Thank you,  Katherin Velasquez, " PharmD  Clinical Pharmacy Specialist  [phone]  [email or web address]     Continue all meds under the continuation of care with the referring provider and clinical pharmacy team.

## 2025-06-13 ENCOUNTER — APPOINTMENT (OUTPATIENT)
Dept: PRIMARY CARE | Facility: CLINIC | Age: 75
End: 2025-06-13
Payer: MEDICARE

## 2025-06-13 DIAGNOSIS — E78.5 HYPERLIPIDEMIA, UNSPECIFIED HYPERLIPIDEMIA TYPE: Primary | ICD-10-CM

## 2025-06-13 DIAGNOSIS — E11.42 TYPE 2 DIABETES MELLITUS WITH DIABETIC POLYNEUROPATHY, WITHOUT LONG-TERM CURRENT USE OF INSULIN: ICD-10-CM

## 2025-06-13 DIAGNOSIS — I10 ESSENTIAL (PRIMARY) HYPERTENSION: ICD-10-CM

## 2025-06-13 DIAGNOSIS — E78.5 HYPERLIPIDEMIA, UNSPECIFIED HYPERLIPIDEMIA TYPE: ICD-10-CM

## 2025-06-13 RX ORDER — COLESTIPOL HYDROCHLORIDE 5 G/5G
5 GRANULE, FOR SUSPENSION ORAL
Qty: 90 G | Refills: 1 | Status: SHIPPED | OUTPATIENT
Start: 2025-06-13 | End: 2026-06-13

## 2025-06-13 RX ORDER — PEN NEEDLE, DIABETIC 30 GX3/16"
NEEDLE, DISPOSABLE MISCELLANEOUS
Qty: 100 EACH | Refills: 11 | Status: SHIPPED | OUTPATIENT
Start: 2025-06-13

## 2025-06-13 RX ORDER — INSULIN GLARGINE 100 [IU]/ML
10 INJECTION, SOLUTION SUBCUTANEOUS NIGHTLY
Qty: 15 ML | Refills: 3 | Status: SHIPPED | OUTPATIENT
Start: 2025-06-13 | End: 2027-02-03

## 2025-06-13 RX ORDER — CLONIDINE HYDROCHLORIDE 0.1 MG/1
0.1 TABLET ORAL 2 TIMES DAILY
Qty: 180 TABLET | Refills: 3 | Status: SHIPPED | OUTPATIENT
Start: 2025-06-13

## 2025-06-16 DIAGNOSIS — E78.5 HYPERLIPIDEMIA, UNSPECIFIED HYPERLIPIDEMIA TYPE: ICD-10-CM

## 2025-06-16 RX ORDER — COLESTIPOL HYDROCHLORIDE 5 G/5G
GRANULE, FOR SUSPENSION ORAL
Qty: 90 G | Refills: 1 | Status: SHIPPED | OUTPATIENT
Start: 2025-06-16 | End: 2025-06-17

## 2025-06-17 RX ORDER — COLESTIPOL HYDROCHLORIDE 5 G/5G
5 GRANULE, FOR SUSPENSION ORAL 2 TIMES DAILY
Qty: 500 G | Refills: 2 | Status: SHIPPED | OUTPATIENT
Start: 2025-06-17 | End: 2025-06-18

## 2025-06-18 DIAGNOSIS — E78.5 HYPERLIPIDEMIA, UNSPECIFIED HYPERLIPIDEMIA TYPE: ICD-10-CM

## 2025-06-18 RX ORDER — CHOLESTYRAMINE LIGHT 4 G/5.7G
4 POWDER, FOR SUSPENSION ORAL 2 TIMES DAILY
Qty: 180 EACH | Refills: 3 | Status: SHIPPED | OUTPATIENT
Start: 2025-06-18

## 2025-06-19 DIAGNOSIS — E78.5 HYPERLIPIDEMIA, UNSPECIFIED HYPERLIPIDEMIA TYPE: ICD-10-CM

## 2025-06-19 DIAGNOSIS — I25.10 ASHD (ARTERIOSCLEROTIC HEART DISEASE): Primary | ICD-10-CM

## 2025-06-19 RX ORDER — COLESTIPOL HYDROCHLORIDE 1 G/1
1 TABLET ORAL
Qty: 180 TABLET | Refills: 3 | Status: SHIPPED | OUTPATIENT
Start: 2025-06-19 | End: 2026-06-19

## 2025-06-20 PROCEDURE — RXMED WILLOW AMBULATORY MEDICATION CHARGE

## 2025-06-23 ENCOUNTER — PHARMACY VISIT (OUTPATIENT)
Dept: PHARMACY | Facility: CLINIC | Age: 75
End: 2025-06-23
Payer: MEDICARE

## 2025-06-23 ENCOUNTER — APPOINTMENT (OUTPATIENT)
Dept: ENDOCRINOLOGY | Facility: CLINIC | Age: 75
End: 2025-06-23
Payer: MEDICARE

## 2025-06-23 VITALS
WEIGHT: 238 LBS | DIASTOLIC BLOOD PRESSURE: 80 MMHG | HEIGHT: 68 IN | BODY MASS INDEX: 36.07 KG/M2 | SYSTOLIC BLOOD PRESSURE: 124 MMHG | HEART RATE: 68 BPM

## 2025-06-23 DIAGNOSIS — Z79.4 LONG-TERM INSULIN USE (MULTI): ICD-10-CM

## 2025-06-23 DIAGNOSIS — E11.65 POORLY CONTROLLED TYPE 2 DIABETES MELLITUS (MULTI): Primary | ICD-10-CM

## 2025-06-23 PROCEDURE — 1159F MED LIST DOCD IN RCRD: CPT | Performed by: INTERNAL MEDICINE

## 2025-06-23 PROCEDURE — 4010F ACE/ARB THERAPY RXD/TAKEN: CPT | Performed by: INTERNAL MEDICINE

## 2025-06-23 PROCEDURE — 99214 OFFICE O/P EST MOD 30 MIN: CPT | Performed by: INTERNAL MEDICINE

## 2025-06-23 PROCEDURE — 95251 CONT GLUC MNTR ANALYSIS I&R: CPT | Performed by: INTERNAL MEDICINE

## 2025-06-23 PROCEDURE — G2211 COMPLEX E/M VISIT ADD ON: HCPCS | Performed by: INTERNAL MEDICINE

## 2025-06-23 PROCEDURE — 3079F DIAST BP 80-89 MM HG: CPT | Performed by: INTERNAL MEDICINE

## 2025-06-23 PROCEDURE — 3074F SYST BP LT 130 MM HG: CPT | Performed by: INTERNAL MEDICINE

## 2025-06-23 PROCEDURE — 1160F RVW MEDS BY RX/DR IN RCRD: CPT | Performed by: INTERNAL MEDICINE

## 2025-06-23 PROCEDURE — 3008F BODY MASS INDEX DOCD: CPT | Performed by: INTERNAL MEDICINE

## 2025-06-23 ASSESSMENT — ENCOUNTER SYMPTOMS: SHORTNESS OF BREATH: 0

## 2025-06-23 NOTE — PROGRESS NOTES
"Subjective   Isauro Vadlez is a 74 y.o. male who presents for follow-up of Type 2 diabetes mellitus. The initial diagnosis of diabetes was made in 2015.     He has had no major changes to his health since his last appointment.  He had a blister.  He now has a small wound and is seeing podiatry every 5 weeks.      Known complications due to diabetes included peripheral neuropathy, CAD s/p MI in 2003 with 1 stent in situ, chronic kidney disease, and obesity    Cardiovascular risk factors include advanced age (older than 55 for men, 65 for women), diabetes mellitus, male gender, and obesity (BMI >= 30 kg/m2). The patient is on an ACE inhibitor or angiotensin II receptor blocker.  The patient has not been previously hospitalized due to diabetic ketoacidosis.     Current symptoms/problems include none. His clinical course has been stable.     The patient is currently checking the blood glucose multiple times per day    Patient is using: continuous glucose monitor                                                              Hypoglycemia frequency: 0%   Hypoglycemia awareness: N/A     Current Medication Regimen  Mounjaro 10mg SQ once weekly   Glimepiride 4mg twice daily   Jardiance 25mg once daily   Metformin 1000mig twice daily   Lantus 10 units subcutaneous bedtime     MEALS:   Gets a fresh produce card  Apples or fruit bowl at night   Coffee and iced tea needs to be sweetened        Denies exercise regimen given back pain and thus not going ot the gym        Review of Systems   Respiratory:  Negative for shortness of breath.    All other systems reviewed and are negative.      Objective   /80 (BP Location: Left arm, Patient Position: Sitting, BP Cuff Size: Adult)   Pulse 68   Ht 1.727 m (5' 8\")   Wt 108 kg (238 lb)   BMI 36.19 kg/m²   Physical Exam  Vitals and nursing note reviewed.   Constitutional:       General: He is not in acute distress.     Appearance: Normal appearance. He is obese.   HENT:      " Head: Normocephalic and atraumatic.      Nose: Nose normal.      Mouth/Throat:      Mouth: Mucous membranes are moist.   Eyes:      Extraocular Movements: Extraocular movements intact.   Cardiovascular:      Rate and Rhythm: Normal rate and regular rhythm.   Pulmonary:      Effort: Pulmonary effort is normal.      Breath sounds: Normal breath sounds.   Musculoskeletal:         General: Normal range of motion.   Neurological:      Mental Status: He is alert and oriented to person, place, and time.   Psychiatric:         Mood and Affect: Mood normal.         Lab Review  GLUCOSE (mg/dL)   Date Value   06/02/2025 211 (H)   03/24/2025 214 (H)     Glucose (mg/dL)   Date Value   10/23/2024 174 (H)   05/13/2024 193 (H)   12/18/2023 180 (H)     HEMOGLOBIN A1c   Date Value   06/02/2025 9.0 % (H)   03/05/2025 9.0 % of total Hgb (H)     Hemoglobin A1C (%)   Date Value   10/23/2024 7.6 (H)   05/13/2024 7.4 (H)   12/18/2023 7.7 (H)     CARBON DIOXIDE (mmol/L)   Date Value   06/02/2025 28   03/24/2025 29     Bicarbonate (mmol/L)   Date Value   10/23/2024 27   05/13/2024 28   12/18/2023 29     UREA NITROGEN (BUN) (mg/dL)   Date Value   06/02/2025 34 (H)   03/24/2025 29 (H)     Urea Nitrogen (mg/dL)   Date Value   10/23/2024 26 (H)   05/13/2024 30 (H)   12/18/2023 25 (H)     Creatinine (mg/dL)   Date Value   10/23/2024 1.34 (H)   05/13/2024 1.34 (H)   12/18/2023 1.23     CREATININE (mg/dL)   Date Value   06/02/2025 1.46 (H)   03/24/2025 1.18     Lab Results   Component Value Date    CHOL 96 06/02/2025    CHOL 148 03/24/2025    CHOL 132 10/23/2024     Lab Results   Component Value Date    HDL 29 (L) 06/02/2025    HDL 36 (L) 03/24/2025    HDL 35.5 10/23/2024     Lab Results   Component Value Date    LDLCALC 41 06/02/2025    LDLCALC 87 03/24/2025    LDLCALC 62 10/23/2024     Lab Results   Component Value Date    TRIG 180 (H) 06/02/2025    TRIG 156 (H) 03/24/2025    TRIG 171 (H) 10/23/2024       Health Maintenance:   Foot Exam: Today at  2pm   Eye Exam:  October 24, 2023  Urine Albumin: March 5, 2025    CGM Interpretation  14 day CGM download was reviewed in detail as documented above and will be attached to chart.  A minimum of 72 hours of glucose data was used to inform the management plan outlined below.    Sufficient data to analyze:  Yes; CGM active 96% of the time  59% of values is above target range, which is elevated above the desired goal.    41% of values is spent in target range, and thus is is  below the desired goal   0% of values is spent with hypoglycemia, and thus at goal     Assessment/Plan      74-year-old male presents for follow up for type 2 diabetes mellitus. His blood pressure is at goal. He is noted to be on a statin.     Poorly controlled type 2 diabetes mellitus (Multi)  To continue Mounjaro 10mg subcutaneous once weekly    To continue Glimepiride 4mg twice daily before breakfast and dinner   To continue Jardiance 25mg once daily   To continue Metformin 1000mg twice daily   To increase Lantus to 14 units subcutaneous bedtime  Please check blood sugars once daily and keep a detailed log  Counseled that the goal A1C should be 7% or less  Counseled glycemic control is warranted to prevent microvascular complications  Please stick to a low carb diet   Please be more physically active and use Silver Sneakers   To continue the use of your CGM for the intensive glucose monitoring due to the dynamic nature of insulin requirements, the narrow therapeutic index of insulin and the potentially fatal consequences of treatment  Counseled that the time in range should be >70%         Long-term insulin use (Multi)  Please rotate insulin injection sites  Insulin adjustment as noted above    For follow up in 3-4 months

## 2025-06-23 NOTE — PATIENT INSTRUCTIONS
Thank you for choosing [facility]  for your health care needs.  If you have any questions, concerns or medical needs, please feel free to contact our office at [phone].    Please ensure you complete your blood work one week before the next scheduled appointment.    To continue Mounjaro 10mg subcutaneous once weekly    To continue Glimepiride 4mg twice daily before breakfast and dinner   To continue Jardiance 25mg once daily   To continue Metformin 1000mg twice daily   To increase Lantus to 14 units subcutaneous bedtime  Please check blood sugars once daily and keep a detailed log  Counseled that the goal A1C should be 7% or less  Counseled glycemic control is warranted to prevent microvascular complications  Please stick to a low carb diet   Please be more physically active and use Silver Sneakers   To continue the use of your CGM for the intensive glucose monitoring due to the dynamic nature of insulin requirements, the narrow therapeutic index of insulin and the potentially fatal consequences of treatment  Counseled that the time in range should be >70%  For follow up in 3-4 months

## 2025-06-29 PROBLEM — Z79.4 LONG-TERM INSULIN USE (MULTI): Status: ACTIVE | Noted: 2025-06-29

## 2025-06-29 NOTE — ASSESSMENT & PLAN NOTE
Please rotate insulin injection sites  Insulin adjustment as noted above    For follow up in 3-4 months

## 2025-06-29 NOTE — ASSESSMENT & PLAN NOTE
To continue Mounjaro 10mg subcutaneous once weekly    To continue Glimepiride 4mg twice daily before breakfast and dinner   To continue Jardiance 25mg once daily   To continue Metformin 1000mg twice daily   To increase Lantus to 14 units subcutaneous bedtime  Please check blood sugars once daily and keep a detailed log  Counseled that the goal A1C should be 7% or less  Counseled glycemic control is warranted to prevent microvascular complications  Please stick to a low carb diet   Please be more physically active and use Silver Sneakers   To continue the use of your CGM for the intensive glucose monitoring due to the dynamic nature of insulin requirements, the narrow therapeutic index of insulin and the potentially fatal consequences of treatment  Counseled that the time in range should be >70%

## 2025-07-11 ENCOUNTER — APPOINTMENT (OUTPATIENT)
Dept: PRIMARY CARE | Facility: CLINIC | Age: 75
End: 2025-07-11
Payer: MEDICARE

## 2025-07-14 NOTE — PROGRESS NOTES
Pharmacist Clinic: Diabetes Management  Isauro Valdez is a 74 y.o. male was referred to Clinical Pharmacy Team for diabetes management.   Referring Provider: Javier Hong, *  - Last visit with referring provider:  5/5/25     Patient Assistance for Mounjaro Elieber Jardiance approved through 1/23/26. Will have to be renewed prior to that date to prevent lapse in coverage. Medication(s) will be received at no cost to patient from [facility].     Subjective     HPI    Current Diabetes Pharmacotherapy:    - Mounjaro 10 mg weekly - Thursday  - Jardiance 25 mg daily  - Metformin 1,000 mg BID  - Glimepiride 4 mg BID with meals  - Lantus 14 units HS    Social History:  Current diet:   - B: english muffin  - L: soup; left overs  - D: chicken; asparagus; squash  - Trying to switch to mediterranean diet  - Seeing a nutritionist   - Cookies throughout the day  - Sugar free thin mint before bed  - Half a pb sandwich before bed   - Denies pop   - Drinks dark cherry juice for gout sometimes     Current exercise:   - None    Current monitoring regimen:   Patient is using: continuous glucose monitor  Type of CGM: Kalpana 3 plus - reader    Reported blood sugars:   See APG Report Below    Any episodes of hypoglycemia? No    Adverse Effects: None        Objective     There were no vitals taken for this visit.    Allergies   Allergen Reactions    Adhesive Tape-Silicones Unknown    Niacin Hives and Unknown    Nystatin Other    Penicillins Hives and Unknown       Historical Diabetes Pharmacotherapy:  None    SECONDARY PREVENTION  - Statin? Yes   - ACE-I/ARB? Yes  - Aspirin? Yes    Pertinent PMH Review:  - PMH of Pancreatitis: No  - PMH of Retinopathy: No  - PMH of Urinary Tract Infections: No  - PMH of Yeast Infections: No  - PMH of MTC: No    Lab Review  Lab Results   Component Value Date    BILITOT 0.6 06/02/2025    CALCIUM 11.3 (H) 06/02/2025    CO2 28 06/02/2025    CL 96 (L) 06/02/2025    CREATININE 1.46 (H)  06/02/2025    GLUCOSE 211 (H) 06/02/2025    ALKPHOS 62 06/02/2025    K 3.8 06/02/2025    PROT 7.4 06/02/2025     06/02/2025    AST 40 (H) 06/02/2025    ALT 53 (H) 06/02/2025    BUN 34 (H) 06/02/2025    ANIONGAP 12 06/02/2025    MG 2.0 03/24/2025    ALBUMIN 4.7 06/02/2025    GFRMALE 62 06/12/2023     Lab Results   Component Value Date    EGFR 51 (L) 06/02/2025      Lab Results   Component Value Date    TRIG 180 (H) 06/02/2025    CHOL 96 06/02/2025    HDL 29 (L) 06/02/2025     Lab Results   Component Value Date    HGBA1C 9.0 (H) 06/02/2025    HGBA1C 9.0 (H) 03/05/2025    HGBA1C 7.6 (H) 10/23/2024     The ASCVD Risk score (Skip RENAE, et al., 2019) failed to calculate for the following reasons:    Risk score cannot be calculated because patient has a medical history suggesting prior/existing ASCVD    Drug Interactions:  - None    Affordability/Accessibility:  - Enrolled in [facility]    Preferred Pharmacy:  - [facility]    Assessment/Plan   Problem List Items Addressed This Visit       Type 2 diabetes mellitus    Relevant Medications    tirzepatide (Mounjaro) 12.5 mg/0.5 mL pen injector    Other Relevant Orders    Referral to Clinical Pharmacy    Hyperlipidemia    Relevant Medications    colestipol (Colestid) 1 gram tablet    ASHD (arteriosclerotic heart disease)    Relevant Medications    tirzepatide (Mounjaro) 12.5 mg/0.5 mL pen injector    colestipol (Colestid) 1 gram tablet       ASSESSMENT:  Patients diabetes is uncontrolled with most recent A1c of 9.0%.   (Goal A1c < 7.5%)    Time in target has doubled! Now 68% in range. No hypoglycemia noted. Denies any side effects. Will increase Mounjaro at this time to 12.5 mg to help bring down some of the highs.    *Patient also brought me previous RX label of colestipol. States he was taking 2 tablets (2 g) twice daily. Dr. HESS sent in 1g twice daily. Will fix this and get sent to pharmacy.     PLAN:  INCREASE Mounjaro to 12.5 mg weekly   CONTINUE all other DM medications  Follow  up with clinical pharmacist: 8/15/25 @ 3:40   Follow up with PCP: 8/20/25    Thank you,  Katherin Velasquez, PharmD  Clinical Pharmacy Specialist  [phone]  [email or web address]     Continue all meds under the continuation of care with the referring provider and clinical pharmacy team.

## 2025-07-15 ENCOUNTER — APPOINTMENT (OUTPATIENT)
Dept: PRIMARY CARE | Facility: CLINIC | Age: 75
End: 2025-07-15
Payer: MEDICARE

## 2025-07-15 DIAGNOSIS — E11.42 TYPE 2 DIABETES MELLITUS WITH DIABETIC POLYNEUROPATHY, WITHOUT LONG-TERM CURRENT USE OF INSULIN: ICD-10-CM

## 2025-07-15 DIAGNOSIS — E78.5 HYPERLIPIDEMIA, UNSPECIFIED HYPERLIPIDEMIA TYPE: ICD-10-CM

## 2025-07-15 DIAGNOSIS — I25.10 ASHD (ARTERIOSCLEROTIC HEART DISEASE): ICD-10-CM

## 2025-07-15 PROCEDURE — RXMED WILLOW AMBULATORY MEDICATION CHARGE

## 2025-07-15 RX ORDER — COLESTIPOL HYDROCHLORIDE 1 G/1
2 TABLET ORAL
Qty: 360 TABLET | Refills: 3 | Status: SHIPPED | OUTPATIENT
Start: 2025-07-15 | End: 2026-07-15

## 2025-07-15 RX ORDER — TIRZEPATIDE 12.5 MG/.5ML
12.5 INJECTION, SOLUTION SUBCUTANEOUS WEEKLY
Qty: 2 ML | Refills: 2 | Status: SHIPPED | OUTPATIENT
Start: 2025-07-15

## 2025-07-16 ENCOUNTER — PHARMACY VISIT (OUTPATIENT)
Dept: PHARMACY | Facility: CLINIC | Age: 75
End: 2025-07-16
Payer: MEDICARE

## 2025-08-06 PROCEDURE — RXMED WILLOW AMBULATORY MEDICATION CHARGE

## 2025-08-07 ENCOUNTER — PHARMACY VISIT (OUTPATIENT)
Dept: PHARMACY | Facility: CLINIC | Age: 75
End: 2025-08-07
Payer: MEDICARE

## 2025-08-11 DIAGNOSIS — E11.49 TYPE 2 DIABETES MELLITUS WITH OTHER DIABETIC NEUROLOGICAL COMPLICATION: ICD-10-CM

## 2025-08-11 RX ORDER — METFORMIN HYDROCHLORIDE 1000 MG/1
1000 TABLET ORAL EVERY 12 HOURS
Qty: 180 TABLET | Refills: 1 | Status: SHIPPED | OUTPATIENT
Start: 2025-08-11

## 2025-08-14 DIAGNOSIS — I10 ESSENTIAL (PRIMARY) HYPERTENSION: ICD-10-CM

## 2025-08-14 DIAGNOSIS — Z86.79 PERSONAL HISTORY OF OTHER DISEASES OF THE CIRCULATORY SYSTEM: ICD-10-CM

## 2025-08-14 RX ORDER — AMLODIPINE BESYLATE 2.5 MG/1
2.5 TABLET ORAL NIGHTLY
Qty: 90 TABLET | Refills: 1 | Status: SHIPPED | OUTPATIENT
Start: 2025-08-14

## 2025-08-14 RX ORDER — HYDROCHLOROTHIAZIDE 25 MG/1
25 TABLET ORAL DAILY
Qty: 90 TABLET | Refills: 1 | Status: SHIPPED | OUTPATIENT
Start: 2025-08-14

## 2025-08-15 ENCOUNTER — APPOINTMENT (OUTPATIENT)
Dept: PRIMARY CARE | Facility: CLINIC | Age: 75
End: 2025-08-15
Payer: MEDICARE

## 2025-08-15 DIAGNOSIS — E11.42 TYPE 2 DIABETES MELLITUS WITH DIABETIC POLYNEUROPATHY, WITHOUT LONG-TERM CURRENT USE OF INSULIN: Primary | ICD-10-CM

## 2025-08-20 ENCOUNTER — APPOINTMENT (OUTPATIENT)
Dept: PRIMARY CARE | Facility: CLINIC | Age: 75
End: 2025-08-20
Payer: MEDICARE

## 2025-08-20 VITALS
HEART RATE: 90 BPM | TEMPERATURE: 97.2 F | HEIGHT: 68 IN | DIASTOLIC BLOOD PRESSURE: 55 MMHG | RESPIRATION RATE: 18 BRPM | WEIGHT: 237 LBS | SYSTOLIC BLOOD PRESSURE: 87 MMHG | OXYGEN SATURATION: 99 % | BODY MASS INDEX: 35.92 KG/M2

## 2025-08-20 DIAGNOSIS — I50.42 CHRONIC COMBINED SYSTOLIC AND DIASTOLIC CHF (CONGESTIVE HEART FAILURE): ICD-10-CM

## 2025-08-20 DIAGNOSIS — E66.01 OBESITY, MORBID (MULTI): ICD-10-CM

## 2025-08-20 DIAGNOSIS — E11.65 POORLY CONTROLLED TYPE 2 DIABETES MELLITUS (MULTI): ICD-10-CM

## 2025-08-20 DIAGNOSIS — Z00.00 ROUTINE GENERAL MEDICAL EXAMINATION AT HEALTH CARE FACILITY: ICD-10-CM

## 2025-08-20 DIAGNOSIS — E83.52 HYPERCALCEMIA: ICD-10-CM

## 2025-08-20 DIAGNOSIS — I10 BENIGN ESSENTIAL HYPERTENSION: ICD-10-CM

## 2025-08-20 DIAGNOSIS — L20.81 ATOPIC NEURODERMATITIS: ICD-10-CM

## 2025-08-20 DIAGNOSIS — E78.5 HYPERLIPIDEMIA, UNSPECIFIED HYPERLIPIDEMIA TYPE: Primary | ICD-10-CM

## 2025-08-20 PROCEDURE — 3008F BODY MASS INDEX DOCD: CPT | Performed by: INTERNAL MEDICINE

## 2025-08-20 PROCEDURE — 99214 OFFICE O/P EST MOD 30 MIN: CPT | Performed by: INTERNAL MEDICINE

## 2025-08-20 PROCEDURE — G2211 COMPLEX E/M VISIT ADD ON: HCPCS | Performed by: INTERNAL MEDICINE

## 2025-08-20 PROCEDURE — 4010F ACE/ARB THERAPY RXD/TAKEN: CPT | Performed by: INTERNAL MEDICINE

## 2025-08-20 PROCEDURE — 3074F SYST BP LT 130 MM HG: CPT | Performed by: INTERNAL MEDICINE

## 2025-08-20 PROCEDURE — 3078F DIAST BP <80 MM HG: CPT | Performed by: INTERNAL MEDICINE

## 2025-08-20 PROCEDURE — 1160F RVW MEDS BY RX/DR IN RCRD: CPT | Performed by: INTERNAL MEDICINE

## 2025-08-20 PROCEDURE — 1159F MED LIST DOCD IN RCRD: CPT | Performed by: INTERNAL MEDICINE

## 2025-08-20 RX ORDER — TRIAMCINOLONE ACETONIDE 1 MG/G
CREAM TOPICAL 2 TIMES DAILY
COMMUNITY
End: 2025-08-20 | Stop reason: SDUPTHER

## 2025-08-20 RX ORDER — TRIAMCINOLONE ACETONIDE 1 MG/G
CREAM TOPICAL 2 TIMES DAILY
Qty: 30 G | Refills: 1 | Status: SHIPPED | OUTPATIENT
Start: 2025-08-20

## 2025-08-20 SDOH — ECONOMIC STABILITY: FOOD INSECURITY: WITHIN THE PAST 12 MONTHS, THE FOOD YOU BOUGHT JUST DIDN'T LAST AND YOU DIDN'T HAVE MONEY TO GET MORE.: NEVER TRUE

## 2025-08-20 SDOH — ECONOMIC STABILITY: FOOD INSECURITY: WITHIN THE PAST 12 MONTHS, YOU WORRIED THAT YOUR FOOD WOULD RUN OUT BEFORE YOU GOT MONEY TO BUY MORE.: NEVER TRUE

## 2025-08-20 ASSESSMENT — LIFESTYLE VARIABLES
HOW OFTEN DO YOU HAVE A DRINK CONTAINING ALCOHOL: NEVER
HOW OFTEN DO YOU HAVE SIX OR MORE DRINKS ON ONE OCCASION: NEVER
HOW MANY STANDARD DRINKS CONTAINING ALCOHOL DO YOU HAVE ON A TYPICAL DAY: PATIENT DOES NOT DRINK
SKIP TO QUESTIONS 9-10: 1
AUDIT-C TOTAL SCORE: 0

## 2025-08-29 PROCEDURE — RXMED WILLOW AMBULATORY MEDICATION CHARGE

## 2025-09-02 ENCOUNTER — PHARMACY VISIT (OUTPATIENT)
Dept: PHARMACY | Facility: CLINIC | Age: 75
End: 2025-09-02
Payer: MEDICARE

## 2025-09-03 PROCEDURE — RXMED WILLOW AMBULATORY MEDICATION CHARGE

## 2025-09-04 ENCOUNTER — PHARMACY VISIT (OUTPATIENT)
Dept: PHARMACY | Facility: CLINIC | Age: 75
End: 2025-09-04
Payer: MEDICARE

## 2025-09-19 ENCOUNTER — APPOINTMENT (OUTPATIENT)
Dept: PHARMACY | Facility: HOSPITAL | Age: 75
End: 2025-09-19
Payer: MEDICARE

## 2025-11-25 ENCOUNTER — APPOINTMENT (OUTPATIENT)
Dept: PRIMARY CARE | Facility: CLINIC | Age: 75
End: 2025-11-25
Payer: MEDICARE

## 2025-12-01 ENCOUNTER — APPOINTMENT (OUTPATIENT)
Dept: ENDOCRINOLOGY | Facility: CLINIC | Age: 75
End: 2025-12-01
Payer: MEDICARE